# Patient Record
Sex: MALE | Race: ASIAN | NOT HISPANIC OR LATINO | Employment: FULL TIME | URBAN - METROPOLITAN AREA
[De-identification: names, ages, dates, MRNs, and addresses within clinical notes are randomized per-mention and may not be internally consistent; named-entity substitution may affect disease eponyms.]

---

## 2017-04-06 ENCOUNTER — HOSPITAL ENCOUNTER (OUTPATIENT)
Dept: ULTRASOUND IMAGING | Facility: CLINIC | Age: 32
Discharge: HOME | End: 2017-04-06
Attending: FAMILY MEDICINE

## 2017-04-06 ENCOUNTER — OFFICE VISIT - HEALTHEAST (OUTPATIENT)
Dept: FAMILY MEDICINE | Facility: CLINIC | Age: 32
End: 2017-04-06

## 2017-04-06 DIAGNOSIS — E01.0 THYROMEGALY: ICD-10-CM

## 2017-04-06 DIAGNOSIS — R35.0 URINARY FREQUENCY: ICD-10-CM

## 2017-04-06 DIAGNOSIS — R22.1 MASS OF RIGHT SIDE OF NECK: ICD-10-CM

## 2021-02-09 ENCOUNTER — OFFICE VISIT - HEALTHEAST (OUTPATIENT)
Dept: FAMILY MEDICINE | Facility: CLINIC | Age: 36
End: 2021-02-09

## 2021-02-09 DIAGNOSIS — R22.1 LUMP IN NECK: ICD-10-CM

## 2021-02-09 DIAGNOSIS — K21.9 GASTROESOPHAGEAL REFLUX DISEASE WITHOUT ESOPHAGITIS: ICD-10-CM

## 2021-02-09 ASSESSMENT — MIFFLIN-ST. JEOR: SCORE: 1543.38

## 2021-05-18 ENCOUNTER — AMBULATORY - HEALTHEAST (OUTPATIENT)
Dept: NURSING | Facility: CLINIC | Age: 36
End: 2021-05-18

## 2021-05-30 VITALS — WEIGHT: 146.9 LBS

## 2021-06-05 VITALS
WEIGHT: 155.3 LBS | DIASTOLIC BLOOD PRESSURE: 84 MMHG | BODY MASS INDEX: 25.87 KG/M2 | HEART RATE: 70 BPM | SYSTOLIC BLOOD PRESSURE: 126 MMHG | HEIGHT: 65 IN

## 2021-06-08 ENCOUNTER — AMBULATORY - HEALTHEAST (OUTPATIENT)
Dept: NURSING | Facility: CLINIC | Age: 36
End: 2021-06-08

## 2021-06-09 NOTE — PROGRESS NOTES
Subjective:   Leonid Mcmillan is a 31 y.o. male and is new to [facility].  Roomed by: Coby    Accompanied by Spouse Monicafazal    services provided by:  Helen     Chief Complaint   Patient presents with     Skin Problem     Swelling bump on right collarbone and left arm and left back; muscle twitching     Headache     2-3 months   Patient's main concern is a lump on the right side of his throat. Says he has noticed that when he lays down he can feel something inside. Denies any pain. First noticed the lump about 2 months ago. Denies any recent sweats, chills, or weight loss. Has felt more tired in the last week. 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 The reason they came in today is because he has felt some muscle switching in his legs and back in the last week. Says the headaches have been on and off for the last 2-3 months. Says when he gets a headache it goes away when he sleeps at night, but lasts all day. Is not having a headache today and denies having leg twitching today. Denies any problems with walking. Denies any problems with feeling or strength in his arms or hands. Denies any vision problems, though he wears eyeglasses to drive. Admits some dizziness off and on. Denies nausea, vomiting, diarrhea or belly pain. Says he urinates frequently, but in small amounts. Says has trouble urinating for the last 2-3 weeks. Denies any recent CP, SOB, or cough.   PMH - Denies any medical conditions  PSH - history of left shoulder surgery about 10 years ago in [place]  FX - HTN, DM in Dad, negative for Cancer, CAD, or kidney disease  Review of Systems  Const - Resp - see HPI  No Known Allergies    Current Outpatient Prescriptions:      MV-MN/FOLIC ACID/LUTEIN/IUQ774 (NOAM MULTIVITAMIN FOR MEN ORAL), Take by mouth., Disp: , Rfl:   There is no problem list on file for this patient.    Medical History Reviewed  Objective:     Vitals:    04/06/17 1235   BP: 112/72   Pulse: 76   Resp: 12   Temp: 98.5  F (36.9  C)   TempSrc: Oral    SpO2: 98%   Weight: 146 lb 14.4 oz (66.6 kg)   Gen - Pt in NAD  Eyes - PERRL, EOMI, Conjunctiva non injected, no drainage  Face - non TTP over maxillary and non TTP over frontal sinus areas  Ears - external canals - no induration, Right TM - not injected, Left TM - not injected   Nose - non congested, no nasal drainage  Pharynx - non injected, tonsils 1+ size  Neck - supple, no cervical adenopathy, about 3 cm diameter palpable firm mass just to the right of the trachea   Cor - RRR w/o murmur  Lungs - good air entry; no wheezes or crackles noted on auscultation - no coughing noted  Skin - no lesions, no rashes noted  Neuro: Oriented, CN - 2-12 intact, Sensory - neg drift, Strengths - 5/5 UE/LE = , DTRs =, Coord - intact FNF / DEVIN  Intact tandem gait, negative Rhomberg    Results for orders placed or performed in visit on 04/06/17   Urinalysis-UC if Indicated   Result Value Ref Range    Color, UA Yellow Colorless, Yellow, Straw, Light Yellow    Clarity, UA Clear Clear    Glucose, UA Negative Negative    Bilirubin, UA Negative Negative    Ketones, UA Negative Negative    Specific Gravity, UA 1.010 1.005 - 1.030    Blood, UA Trace (!) Negative    pH, UA 7.0 5.0 - 8.0    Protein, UA Negative Negative mg/dL    Urobilinogen, UA 0.2 E.U./dL 0.2 E.U./dL, 1.0 E.U./dL    Nitrite, UA Negative Negative    Leukocytes, UA Negative Negative    Bacteria, UA None Seen None Seen hpf    RBC, UA 0-2 None Seen, 0-2 hpf    WBC, UA None Seen None Seen, 0-5 hpf    Squam Epithel, UA 0-5 None Seen, 0-5 lpf   Comprehensive Metabolic Panel   Result Value Ref Range    Sodium 142 136 - 145 mmol/L    Potassium 3.8 3.5 - 5.0 mmol/L    Chloride 106 98 - 107 mmol/L    CO2 29 22 - 31 mmol/L    Anion Gap, Calculation 7 5 - 18 mmol/L    Glucose 82 70 - 125 mg/dL    BUN 9 8 - 22 mg/dL    Creatinine 0.90 0.70 - 1.30 mg/dL    GFR MDRD Af Amer >60 >60 mL/min/1.73m2    GFR MDRD Non Af Amer >60 >60 mL/min/1.73m2    Bilirubin, Total 0.5 0.0 - 1.0 mg/dL     Calcium 9.0 8.5 - 10.5 mg/dL    Protein, Total 7.8 6.0 - 8.0 g/dL    Albumin 4.2 3.5 - 5.0 g/dL    Alkaline Phosphatase 53 45 - 120 U/L    AST 18 0 - 40 U/L    ALT 22 0 - 45 U/L   Thyroid Cascade   Result Value Ref Range    TSH 0.89 0.30 - 5.00 uIU/mL   HM1 (CBC with Diff)   Result Value Ref Range    WBC 6.2 4.0 - 11.0 thou/uL    RBC 5.32 4.40 - 6.20 mill/uL    Hemoglobin 16.1 14.0 - 18.0 g/dL    Hematocrit 46.6 40.0 - 54.0 %    MCV 88 80 - 100 fL    MCH 30.3 27.0 - 34.0 pg    MCHC 34.6 32.0 - 36.0 g/dL    RDW 12.1 11.0 - 14.5 %    Platelets 202 140 - 440 thou/uL    MPV 7.8 7.0 - 10.0 fL    Neutrophils % 66 50 - 70 %    Lymphocytes % 24 20 - 40 %    Monocytes % 7 2 - 10 %    Eosinophils % 2 0 - 6 %    Basophils % 1 0 - 2 %    Neutrophils Absolute 4.1 2.0 - 7.7 thou/uL    Lymphocytes Absolute 1.5 0.8 - 4.4 thou/uL    Monocytes Absolute 0.5 0.0 - 0.9 thou/uL    Eosinophils Absolute 0.1 0.0 - 0.4 thou/uL    Basophils Absolute 0.0 0.0 - 0.2 thou/uL   Lab result discussed on day of visit.     Us Thyroid    Result Date: 4/6/2017  US THYROID 4/6/2017 2:54 PM INDICATION: Localized swelling, mass and lump, neck TECHNIQUE: Routine. COMPARISON: None. FINDINGS: Right thyroid lobe measures 5.8 x 3.8 x 3.5 cm. Solid isoechoic mass replacing a majority of the right thyroid lobe measures 4.1 x 2.7 x 3.4 cm. Separate mostly cystic partly solid nodule at the right lower pole measuring 1.3 x 1.3 x 1.0 cm.   Left thyroid lobe measures 5.2 x 1.7 x 1.8 cm. Normal echotexture with no focal nodule.  Isthmus measures 2 mm. No additional findings. No cervical lymphadenopathy. Targeted scanning was also performed at sites of lumps in the lateral left upper extremity and left lower back. Corresponding to the palpable lump of the left arm is a sharply circumscribed 8 x 8 x 5 mm hypoechoic nodule with enhanced through transmission of echoes in the subcutaneous fat. There is a similar finding in the subcutaneous fat of the left lower back  corresponding to the palpable lump measuring 6 x 4 x 6 mm. These are probably skin related lesions, possibly sebaceous cysts.     CONCLUSION: 1.  4.1 cm solid mass of the right thyroid lobe. Biopsy under ultrasound guidance suggested. 2.  Separate mostly cystic partly solid nodule at the right lower pole measuring 13 mm. 3.  Small subcutaneous cystic nodules of the left arm and left lower back correlating to palpable lumps are probably skin related lesion, possibly sebaceous cysts. Clinical follow-up suggested. The sonographer communicated the findings to the referring provider after conclusion of the examination on 4/6/2017.  Radiologist's report discussed day of visit.     Assessment - Plan   Medical Decision Making -Patient with thyroid mass but a normal TSH, CMP and CBC. Possibly subacute thyroiditis, but non TTP. No signs of hyper or hypothyroidism today. Discussed patient's thyroid ultrasound with his wife and that patient needs a biopsy of his thyroid and that it should be ordered by one of the primary providers.     1. Thyromegaly     2. Mass of right side of neck  HM1(CBC and Differential)    Comprehensive Metabolic Panel    Thyroid Cascade    HM1 (CBC with Diff)    US Thyroid   3. Urinary frequency  Urinalysis-UC if Indicated       At the conclusion of the encounter, assessment and plan were discussed. All questions were answered. The patient or guardian acknowledged understanding and was involved in the decision making regarding the overall care plan.    Patient Instructions   1. Wait at [facility] to discuss the ultrasound results  2. Follow up with one of the primary providers as soon as possible  3. If symptoms are getting worse over time or you have any questions, call the clinic number

## 2021-06-15 NOTE — PROGRESS NOTES
"Assessment:   1. Lump in neck  Discussed possible diagnoses and need for further evaluation.  Recommend the patient obtain ultrasound-guided fine-needle aspiration of the mass and follow-up as discussed.  Patient stated that he will think about it.  - US BIOPSY THYROID FINE NEEDLE ASPIRATION; Future    2. Gastroesophageal reflux disease without esophagitis  Discussed possible diagnoses and treatments.  Recommend using Tums as needed and follow-up if symptoms persist or worsens.      26 minutes spent on the date of the encounter doing chart review, patient visit and documentation        Plan:   1. fine needle aspiration biopsy  2. Return for follow-up as needed.     Subjective:   Leonid Mcmillan is a 35 y.o. male who presents for evaluation of a left neck mass.  A professional phone  was used during this visit.  The patient reports that the mass has been present for 6 years. The onset of the mass was gradual. Associated symptoms were positive for none of significance. There has not been a history of pain. The recent exposure history has been negative. Prior antibiotics have included none. Patient also reports burning sensation around his chest and throat. He reports that this does not happen all the time. He reports that symptom is mild but he is not sure is the burning sensation is due to the neck mass. Patient as no other concerns. He denied chest pain, shortness of breath and syncope.     The following portions of the patient's history were reviewed and updated as appropriate: allergies, current medications, past family history, past medical history, past social history, past surgical history and problem list.    Review of Systems  A 12 point comprehensive review of systems was negative except as noted.       Objective:      /84   Pulse 70   Ht 5' 4.5\" (1.638 m)   Wt 155 lb 4.8 oz (70.4 kg)   BMI 26.25 kg/m      General:   healthy, alert, appears stated age, not in distress   Head and Face:   no " craniofacial deformities   External Ears:   normal pinnae shape and position   Ext. Aud. Canal:  Right:patent    Left: patent    Tympanic Mem:  Right: normal landmarks and mobility   Left: normal landmarks and mobility   Nose:  Nares normal. Septum midline. Mucosa normal. No drainage or sinus tenderness.   Oropharynx:   normal tongue movement   Tonsils:   normal size, normal appearance   Post. Pharynx:   normal mucosa   Neck:   single 2 cm cervical mass on the right, firm, increases with straining   Thyroid:   Normal        Return in about 3 months (around 5/9/2021) for Recheck.    LEX Noel  [facility]

## 2022-02-07 ENCOUNTER — IMMUNIZATION (OUTPATIENT)
Dept: NURSING | Facility: CLINIC | Age: 37
End: 2022-02-07
Payer: COMMERCIAL

## 2022-02-07 PROCEDURE — 0054A COVID-19,PF,PFIZER (12+ YRS): CPT

## 2022-02-07 PROCEDURE — 91305 COVID-19,PF,PFIZER (12+ YRS): CPT

## 2022-10-18 ENCOUNTER — OFFICE VISIT (OUTPATIENT)
Dept: INTERNAL MEDICINE | Facility: CLINIC | Age: 37
End: 2022-10-18
Payer: COMMERCIAL

## 2022-10-18 VITALS
OXYGEN SATURATION: 98 % | WEIGHT: 156.3 LBS | HEIGHT: 65 IN | HEART RATE: 68 BPM | SYSTOLIC BLOOD PRESSURE: 118 MMHG | DIASTOLIC BLOOD PRESSURE: 84 MMHG | BODY MASS INDEX: 26.04 KG/M2

## 2022-10-18 DIAGNOSIS — M54.12 CERVICAL RADICULOPATHY: Primary | ICD-10-CM

## 2022-10-18 PROCEDURE — 99213 OFFICE O/P EST LOW 20 MIN: CPT | Performed by: INTERNAL MEDICINE

## 2022-10-18 RX ORDER — PREDNISONE 20 MG/1
60 TABLET ORAL DAILY
Qty: 15 TABLET | Refills: 0 | Status: SHIPPED | OUTPATIENT
Start: 2022-10-18 | End: 2022-10-23

## 2022-10-18 NOTE — PROGRESS NOTES
"  Assessment & Plan   Problem List Items Addressed This Visit    None  Visit Diagnoses     Cervical radiculopathy    -  Primary    Relevant Medications    predniSONE (DELTASONE) 20 MG tablet           Subacute right C6 radiculopathy.  I am going to treat him with a 5-day burst of prednisone.  If he is not improved afterwards he should see us back for consideration of advanced imaging.  Otherwise follow-up with us as needed.    No follow-ups on file.    Antonio Puga MD  [facility]    Malena Jaquez is a 37 year old accompanied by his spouse, Helen presenting for the following health issues:    37-year-old male without a primary care physician who comes in today for evaluation of right-sided upper back and shoulder pain with radiation down the arm and into the hand.  He states that this is been going on for the last 4 to 5 weeks.  He states that he will get pain that starts in his neck and upper back near the scapula and radiate down the arm just past the elbow and sometimes into the fourth and fifth digits of the right hand.  He does not have any problems with reaching overhead but states that his pain will worsen while sleeping on the right side.  It improves on the left side.  He is right-hand dominant.  No history of neck or shoulder surgery in the past.  He has been taking Aleve for the pain which helps temporarily    Objective    /84 (BP Location: Right arm, Patient Position: Sitting, Cuff Size: Adult Regular)   Pulse 68   Ht 1.638 m (5' 4.5\")   Wt 70.9 kg (156 lb 4.8 oz)   SpO2 98%   BMI 26.41 kg/m    Body mass index is 26.41 kg/m .  Physical Exam     Musculoskeletal: Right shoulder range of motion is full to flexion and abduction.  Strength is 5 out of 5 resisted shoulder abduction, external rotation, internal rotation, and supraspinatus testing.  Triceps reflexes slightly decreased on the right as compared to the left.  Brachioradialis and biceps reflexes are " equal bilaterally.  Spurling's test is positive on the right.

## 2023-03-02 ENCOUNTER — NURSE TRIAGE (OUTPATIENT)
Dept: NURSING | Facility: CLINIC | Age: 38
End: 2023-03-02
Payer: COMMERCIAL

## 2023-03-02 NOTE — TELEPHONE ENCOUNTER
Call from patient's wife, Deyanira, who says patient was having COVID-19, then he started renato up blood. She is asking since the clinics are booked out several months, can she take him to the ED.    Informed her that writer cannot triage w/o patient and also there's no consent to communicate for her.    Informed to call back w/ patient but if she's very concerned then he could go to the ED.      Da Mac RN, BSN  Triage Nurse Advisor    Reason for Disposition    Information only question and nurse able to answer    Protocols used: INFORMATION ONLY CALL - NO TRIAGE-A-[place]

## 2023-03-03 ENCOUNTER — APPOINTMENT (OUTPATIENT)
Dept: CT IMAGING | Facility: CLINIC | Age: 38
End: 2023-03-03
Attending: EMERGENCY MEDICINE
Payer: COMMERCIAL

## 2023-03-03 ENCOUNTER — OFFICE VISIT (OUTPATIENT)
Dept: FAMILY MEDICINE | Facility: CLINIC | Age: 38
End: 2023-03-03
Payer: COMMERCIAL

## 2023-03-03 ENCOUNTER — APPOINTMENT (OUTPATIENT)
Dept: RADIOLOGY | Facility: CLINIC | Age: 38
End: 2023-03-03
Attending: EMERGENCY MEDICINE
Payer: COMMERCIAL

## 2023-03-03 ENCOUNTER — HOSPITAL ENCOUNTER (EMERGENCY)
Facility: CLINIC | Age: 38
Discharge: HOME | End: 2023-03-03
Attending: EMERGENCY MEDICINE | Admitting: EMERGENCY MEDICINE
Payer: COMMERCIAL

## 2023-03-03 VITALS
OXYGEN SATURATION: 97 % | SYSTOLIC BLOOD PRESSURE: 123 MMHG | RESPIRATION RATE: 24 BRPM | HEIGHT: 64 IN | HEART RATE: 59 BPM | WEIGHT: 145 LBS | DIASTOLIC BLOOD PRESSURE: 89 MMHG | BODY MASS INDEX: 24.75 KG/M2 | TEMPERATURE: 98.1 F

## 2023-03-03 VITALS
HEART RATE: 72 BPM | TEMPERATURE: 98.2 F | DIASTOLIC BLOOD PRESSURE: 82 MMHG | OXYGEN SATURATION: 99 % | SYSTOLIC BLOOD PRESSURE: 132 MMHG | RESPIRATION RATE: 16 BRPM

## 2023-03-03 DIAGNOSIS — R04.2 HEMOPTYSIS: ICD-10-CM

## 2023-03-03 DIAGNOSIS — E04.1 THYROID NODULE: ICD-10-CM

## 2023-03-03 DIAGNOSIS — R04.2 HEMOPTYSIS: Primary | ICD-10-CM

## 2023-03-03 LAB
ALBUMIN SERPL-MCNC: 3.9 G/DL (ref 3.5–5)
ALP SERPL-CCNC: 63 U/L (ref 45–120)
ALT SERPL W P-5'-P-CCNC: 26 U/L (ref 0–45)
ANION GAP SERPL CALCULATED.3IONS-SCNC: 7 MMOL/L (ref 5–18)
APTT PPP: 29 SECONDS (ref 22–38)
AST SERPL W P-5'-P-CCNC: 20 U/L (ref 0–40)
BASOPHILS # BLD AUTO: 0 10E3/UL (ref 0–0.2)
BASOPHILS NFR BLD AUTO: 1 %
BILIRUB SERPL-MCNC: 0.3 MG/DL (ref 0–1)
BUN SERPL-MCNC: 16 MG/DL (ref 8–22)
CALCIUM SERPL-MCNC: 8.7 MG/DL (ref 8.5–10.5)
CHLORIDE BLD-SCNC: 108 MMOL/L (ref 98–107)
CO2 SERPL-SCNC: 25 MMOL/L (ref 22–31)
CREAT SERPL-MCNC: 0.86 MG/DL (ref 0.7–1.3)
D DIMER PPP FEU-MCNC: <=0.27 UG/ML FEU (ref 0–0.5)
EOSINOPHIL # BLD AUTO: 0.1 10E3/UL (ref 0–0.7)
EOSINOPHIL NFR BLD AUTO: 2 %
ERYTHROCYTE [DISTWIDTH] IN BLOOD BY AUTOMATED COUNT: 12.1 % (ref 10–15)
GFR SERPL CREATININE-BSD FRML MDRD: >90 ML/MIN/1.73M2
GLUCOSE BLD-MCNC: 103 MG/DL (ref 70–125)
HCT VFR BLD AUTO: 44.1 % (ref 40–53)
HGB BLD-MCNC: 14.8 G/DL (ref 13.3–17.7)
IMM GRANULOCYTES # BLD: 0 10E3/UL
IMM GRANULOCYTES NFR BLD: 0 %
INR PPP: 1.06 (ref 0.85–1.15)
LYMPHOCYTES # BLD AUTO: 1.5 10E3/UL (ref 0.8–5.3)
LYMPHOCYTES NFR BLD AUTO: 26 %
MCH RBC QN AUTO: 28.8 PG (ref 26.5–33)
MCHC RBC AUTO-ENTMCNC: 33.6 G/DL (ref 31.5–36.5)
MCV RBC AUTO: 86 FL (ref 78–100)
MONOCYTES # BLD AUTO: 0.5 10E3/UL (ref 0–1.3)
MONOCYTES NFR BLD AUTO: 8 %
NEUTROPHILS # BLD AUTO: 3.6 10E3/UL (ref 1.6–8.3)
NEUTROPHILS NFR BLD AUTO: 63 %
NRBC # BLD AUTO: 0 10E3/UL
NRBC BLD AUTO-RTO: 0 /100
PLATELET # BLD AUTO: 203 10E3/UL (ref 150–450)
POTASSIUM BLD-SCNC: 4.2 MMOL/L (ref 3.5–5)
PROT SERPL-MCNC: 7.6 G/DL (ref 6–8)
RBC # BLD AUTO: 5.13 10E6/UL (ref 4.4–5.9)
SODIUM SERPL-SCNC: 140 MMOL/L (ref 136–145)
TROPONIN I SERPL-MCNC: <0.01 NG/ML (ref 0–0.29)
TSH SERPL DL<=0.005 MIU/L-ACNC: 1.07 UIU/ML (ref 0.3–5)
WBC # BLD AUTO: 5.6 10E3/UL (ref 4–11)

## 2023-03-03 PROCEDURE — 71275 CT ANGIOGRAPHY CHEST: CPT

## 2023-03-03 PROCEDURE — 99214 OFFICE O/P EST MOD 30 MIN: CPT | Performed by: PHYSICIAN ASSISTANT

## 2023-03-03 PROCEDURE — 71046 X-RAY EXAM CHEST 2 VIEWS: CPT

## 2023-03-03 PROCEDURE — 250N000011 HC RX IP 250 OP 636: Performed by: EMERGENCY MEDICINE

## 2023-03-03 PROCEDURE — 85610 PROTHROMBIN TIME: CPT | Performed by: EMERGENCY MEDICINE

## 2023-03-03 PROCEDURE — 85730 THROMBOPLASTIN TIME PARTIAL: CPT | Performed by: EMERGENCY MEDICINE

## 2023-03-03 PROCEDURE — 99285 EMERGENCY DEPT VISIT HI MDM: CPT | Mod: 25

## 2023-03-03 PROCEDURE — 250N000013 HC RX MED GY IP 250 OP 250 PS 637: Performed by: EMERGENCY MEDICINE

## 2023-03-03 PROCEDURE — 85025 COMPLETE CBC W/AUTO DIFF WBC: CPT | Performed by: EMERGENCY MEDICINE

## 2023-03-03 PROCEDURE — 93005 ELECTROCARDIOGRAM TRACING: CPT | Performed by: EMERGENCY MEDICINE

## 2023-03-03 PROCEDURE — 80053 COMPREHEN METABOLIC PANEL: CPT | Performed by: EMERGENCY MEDICINE

## 2023-03-03 PROCEDURE — 84484 ASSAY OF TROPONIN QUANT: CPT | Performed by: EMERGENCY MEDICINE

## 2023-03-03 PROCEDURE — 84443 ASSAY THYROID STIM HORMONE: CPT | Performed by: EMERGENCY MEDICINE

## 2023-03-03 PROCEDURE — 36415 COLL VENOUS BLD VENIPUNCTURE: CPT | Performed by: EMERGENCY MEDICINE

## 2023-03-03 PROCEDURE — 85379 FIBRIN DEGRADATION QUANT: CPT | Performed by: EMERGENCY MEDICINE

## 2023-03-03 RX ORDER — BENZONATATE 200 MG/1
200 CAPSULE ORAL 3 TIMES DAILY PRN
Qty: 15 CAPSULE | Refills: 0 | Status: SHIPPED | OUTPATIENT
Start: 2023-03-03 | End: 2023-03-08

## 2023-03-03 RX ORDER — BENZONATATE 100 MG/1
200 CAPSULE ORAL 3 TIMES DAILY PRN
Status: DISCONTINUED | OUTPATIENT
Start: 2023-03-03 | End: 2023-03-04 | Stop reason: HOSPADM

## 2023-03-03 RX ORDER — METHYLPREDNISOLONE 4 MG
TABLET, DOSE PACK ORAL
Qty: 21 TABLET | Refills: 0 | Status: SHIPPED | OUTPATIENT
Start: 2023-03-03 | End: 2023-05-08

## 2023-03-03 RX ORDER — IOPAMIDOL 755 MG/ML
90 INJECTION, SOLUTION INTRAVASCULAR ONCE
Status: COMPLETED | OUTPATIENT
Start: 2023-03-03 | End: 2023-03-03

## 2023-03-03 RX ADMIN — IOPAMIDOL 90 ML: 755 INJECTION, SOLUTION INTRAVENOUS at 20:32

## 2023-03-03 RX ADMIN — BENZONATATE 200 MG: 100 CAPSULE ORAL at 22:16

## 2023-03-03 ASSESSMENT — ENCOUNTER SYMPTOMS
VOMITING: 0
NAUSEA: 0
FEVER: 0
WHEEZING: 1
SORE THROAT: 0
CHILLS: 0
VOMITING: 0
FEVER: 0
ABDOMINAL PAIN: 0
COUGH: 1
SHORTNESS OF BREATH: 1
DIAPHORESIS: 0
SHORTNESS OF BREATH: 1
COUGH: 1

## 2023-03-03 ASSESSMENT — ACTIVITIES OF DAILY LIVING (ADL)
ADLS_ACUITY_SCORE: 35
ADLS_ACUITY_SCORE: 35

## 2023-03-03 NOTE — ED PROVIDER NOTES
EMERGENCY DEPARTMENT ENCOUNTER      NAME: Leonid Mcmillan  AGE: 37 year old male  YOB: 1985  MRN: [id number]  EVALUATION DATE & TIME: 3/3/2023  5:33 PM    PCP: System, Provider Not In    ED PROVIDER: Grace Bryan PA-C      Chief Complaint   Patient presents with     Hemoptysis         FINAL IMPRESSION:  1. Hemoptysis    2. Thyroid nodule          ED COURSE & MEDICAL DECISION MAKING:    Pertinent Labs & Imaging studies reviewed. (See chart for details)    37 year old male presents to the Emergency Department for evaluation of hemoptysis.    Physical exam is remarkable for a well-appearing male who is in no acute distress.  Heart and lung sounds are remarkable for coarse sounds throughout, no respiratory distress.  Abdomen is soft and nontender.  Oropharynx is unremarkable appearing. He does have a large thyroid nodule on the right side which is easily palpable. No calf tenderness.  Vital signs are stable and he is afebrile.    CBC is unremarkable with no leukocytosis or anemia.  CMP is unremarkable with no significant electrolyte derangements, normal liver and kidney function.  Troponin is negative, EKG without acute ischemic changes.  D-dimer is negative.  PTT and INR within normal limits.  TSH is pending but would not change disposition.  Chest x-ray is negative with no evidence of pneumonia.  Despite negative D-dimer, given that there is no obvious source for hemoptysis and he is a large thyroid nodule on the right side which has not been evaluated for several years, CTA of the chest was obtained.  This does show deviation of the trachea due to the thyroid nodule, small intraluminal tracheal component is noted on the right.    The patient declined any medication for pain or nausea.  Discussed his CTA findings with ENT due to concern about possible thyroid bleeding as the cause of his hemoptysis, they recommend outpatient work-up since the patient is hemodynamically stable here and there is no evidence of  tracheal erosion on his CT scan.  I do think that is reasonable as this has been going on for about 2 weeks and his hemoglobin here is normal.  I did place an urgent referral to ENT, discussed that his hemoptysis may just be due to some mucosal irritation since he has been coughing with his recent COVID infection.  I will prescribe him Tessalon Perles and a Medrol Dosepak to help treat the cough and prevent further damage to any possibly damaged mucosa.  Advised him to return here for any new or worsening symptoms, he is agreeable with this treatment plan and verbalized his understanding.  Patient's care was discussed with staff physician Dr. Prakash Dupree who is in agreement with the treatment plan.        Medical Decision Making    History:    Supplemental history from: Family Member/Significant Other    External Record(s) reviewed: Inpatient Record:  Clinic Note - 3/3/23    Work Up:    Chart documentation includes differential considered and any EKGs or imaging independently interpreted by provider, where specified.    In additional to work up documented, I considered the following work up: Documented in chart, if applicable.    External consultation:    Discussion of management with another provider: ENT    Complicating factors:    Care impacted by chronic illness: N/A    Care affected by social determinants of health: N/A    Disposition considerations: Discharge. No recommendations on prescription strength medication(s). I considered admission, but discharged the patient after share decision making conversation.        ED Course   5:40 PM Performed my initial history and physical exam. Discussed workup in the emergency department, management of symptoms, and likely disposition.   7:40 PM I rechecked and updated the patient.   9:15 PM Staffed patient with Dr. Dupree.   9:16 PM I rechecked and updated the patient.   9:37 PM I spoke to Dr. Ac ENT.   9:50 PM Dr. Dupree discussed the plan for discharge with the  patient or family and they are agreeable.. We discussed supportive cares at home and reasons for return to the ER including new or worsening symptoms - all questions and concerns addressed. Patient to be discharged by RN.       At the conclusion of the encounter I discussed the results of all of the tests and the disposition. The questions were answered. The patient or family acknowledged understanding and was agreeable with the care plan.     Voice recognition software was used in the creation of this note. Any grammatical or nonsensical errors are due to inherent errors with the software and are not the intention of the writer.     MEDICATIONS GIVEN IN THE EMERGENCY:  Medications   benzonatate (TESSALON) capsule 200 mg (has no administration in time range)   iopamidol (ISOVUE-370) solution 90 mL (90 mLs Intravenous $Given 3/3/23 2032)       NEW PRESCRIPTIONS STARTED AT TODAY'S ER VISIT  New Prescriptions    BENZONATATE (TESSALON) 200 MG CAPSULE    Take 1 capsule (200 mg) by mouth 3 times daily as needed for cough    METHYLPREDNISOLONE (MEDROL DOSEPAK) 4 MG TABLET THERAPY PACK    Follow Package Directions            =================================================================    HPI    Patient information was obtained from: Patient     Use of : N/A         Leonid Mcmillan is a 37 year old male who presents with hemoptysis.     Per chart review, the patient was seen at [facility] earlier today for hemoptysis. Given recent COVID infection provider noted that the patient has hypercoagulability risk factor, but otherwise PERC neg. No D Dimer capabilities in the evening at the  clinic, prompting patient to be evaluated in the ED.      For the past two weeks, the patient reports occasional episodes of hemoptysis. He has been coughing up approximately a tablespoon of blood with no phlegm during these episodes; he is unsure how many episodes he has had. The patient adds that when he isn't coughing up blood,  he has a dry cough. The patient denies chest pain, but endorses shortness of breath, especially when laying flat in bed and upon exertion. He denies abdominal pain. No nausea or vomiting. The patient denies a history of PE or DVT. No recent travel, recent surgeries, or estrogen containing medications. He is not on blood thinners.     Patient reports being diagnosed with COVID-19 ~1 month ago and denies ongoing fevers, chills, and night sweats. Per wife, the patient has had one booster shot for COVID-19, but is potentially overdue for a second booster.      He denies pertinent medical issues, including no HTN, diabetes, or heart disease.     He otherwise denies any other symptoms or complaints at this time.     Social Hx: Non-Smoker. Non-Alcohol User. Moved to the [place] in 2014 from [facility] (~7 years ago). He reports no history of tuberculosis and received a BCG vaccine.    REVIEW OF SYSTEMS   Review of Systems   Constitutional: Negative for chills, diaphoresis and fever.   Respiratory: Positive for cough (with blood) and shortness of breath.    Cardiovascular: Negative for chest pain.   Gastrointestinal: Negative for abdominal pain, nausea and vomiting.   All other systems reviewed and are negative.      All other systems reviewed and are negative unless noted in HPI.      PAST MEDICAL HISTORY:  History reviewed. No pertinent past medical history.    PAST SURGICAL HISTORY:  History reviewed. No pertinent surgical history.    CURRENT MEDICATIONS:    benzonatate (TESSALON) 200 MG capsule  methylPREDNISolone (MEDROL DOSEPAK) 4 MG tablet therapy pack        ALLERGIES:  No Known Allergies    FAMILY HISTORY:  History reviewed. No pertinent family history.    SOCIAL HISTORY:   Social History     Socioeconomic History     Marital status:    Tobacco Use     Smoking status: Never     Smokeless tobacco: Never       VITALS:  Patient Vitals for the past 24 hrs:   BP Temp Temp src Pulse Resp SpO2 Height Weight  "  03/03/23 2145 (!) 130/94 -- -- 63 24 96 % -- --   03/03/23 2100 -- -- -- 64 -- 98 % -- --   03/03/23 1915 -- -- -- 69 27 97 % -- --   03/03/23 1830 -- -- -- 66 23 96 % -- --   03/03/23 1738 131/85 98.1  F (36.7  C) Oral 77 18 99 % 1.626 m (5' 4\") 65.8 kg (145 lb)       PHYSICAL EXAM    VITAL SIGNS: BP (!) 130/94   Pulse 63   Temp 98.1  F (36.7  C) (Oral)   Resp 24   Ht 1.626 m (5' 4\")   Wt 65.8 kg (145 lb)   SpO2 96%   BMI 24.89 kg/m    General Appearance: Alert, cooperative, normal speech and facial symmetry, appears stated age, the patient does not appear in distress  Head:  Normocephalic, without obvious abnormality, atraumatic  Eyes: Conjunctiva/corneas clear, EOM's intact, no nystagmus, PERRL  ENT: Large thyroid nodule on the right side which is easily palpable; Lips, mucosa, and tongue normal; teeth and gums normal, no pharyngeal inflammation, no dysphonia or difficulty swallowing, membranes are moist without pallor  Cardio:  Regular rate and rhythm, S1 and S2 normal, no murmur, rub    or gallop, 2+ pulses symmetric in all extremities  Pulm:  Clear to auscultation bilaterally, respirations unlabored with no accessory muscle use  Abdomen:  Abdomen is soft, non-distended with no tenderness to palpation, rebound tenderness, or guarding.   Extremities: Moves all extremities, no calf tenderness  Neuro: Patient is awake, alert, and responsive to voice. No gross motor weaknesses or sensory loss; moves all extremities.    LAB:  All pertinent labs reviewed and interpreted.  Labs Ordered and Resulted from Time of ED Arrival to Time of ED Departure   COMPREHENSIVE METABOLIC PANEL - Abnormal       Result Value    Sodium 140      Potassium 4.2      Chloride 108 (*)     Carbon Dioxide (CO2) 25      Anion Gap 7      Urea Nitrogen 16      Creatinine 0.86      Calcium 8.7      Glucose 103      Alkaline Phosphatase 63      AST 20      ALT 26      Protein Total 7.6      Albumin 3.9      Bilirubin Total 0.3      GFR " Estimate >90     D DIMER QUANTITATIVE - Normal    D-Dimer Quantitative <=0.27     INR - Normal    INR 1.06     PARTIAL THROMBOPLASTIN TIME - Normal    aPTT 29     TROPONIN I - Normal    Troponin I <0.01     CBC WITH PLATELETS AND DIFFERENTIAL    WBC Count 5.6      RBC Count 5.13      Hemoglobin 14.8      Hematocrit 44.1      MCV 86      MCH 28.8      MCHC 33.6      RDW 12.1      Platelet Count 203      % Neutrophils 63      % Lymphocytes 26      % Monocytes 8      % Eosinophils 2      % Basophils 1      % Immature Granulocytes 0      NRBCs per 100 WBC 0      Absolute Neutrophils 3.6      Absolute Lymphocytes 1.5      Absolute Monocytes 0.5      Absolute Eosinophils 0.1      Absolute Basophils 0.0      Absolute Immature Granulocytes 0.0      Absolute NRBCs 0.0     TSH WITH FREE T4 REFLEX       RADIOLOGY:  Reviewed all pertinent imaging. Please see official radiology report.  CT Chest Pulmonary Embolism w Contrast   Final Result   IMPRESSION:   1.  Markedly enlarged right lobe of the thyroid with tracheal deviation to the left of midline. Further evaluation with thyroid ultrasound suggested.   2.  Minimal intraluminal, tracheal component on the right where trachea is deviated most likely reflects adherent secretions rather than a polyp or an infiltrative component.   3.  No other abnormalities are seen to explain hemoptysis.   4.  No evidence of pulmonary emboli.      NOTE: ABNORMAL REPORT      1.  THE DICTATION ABOVE DESCRIBES AN ABNORMALITY FOR WHICH FOLLOW-UP IS NEEDED.       Chest XR,  PA & LAT   Final Result   IMPRESSION: Lungs are clear. Heart and pulmonary vascularity are normal. No signs of acute disease.          EKG:    Performed at: 18:04    I have independently reviewed and interpreted the EKG, along with the final read. EKG also reviewed by Dr. Beka Guy.    Ventricular rate 72 bpm  LA interval 160 ms  QRS duration 96 ms  QT/QTc 382/418 ms  P-R-T axes 25 9 20    Impression: Sinus rhythm with sinus  arrhythmia      I, Deion Edwards, am serving as a scribe to document services personally performed by Grace Bryan PA-C based on my observation and the provider's statements to me. I, Grace Bryan PA-C attest that Deion Edwards is acting in a scribe capacity, has observed my performance of the services and has documented them in accordance with my direction.     Grace Bryan PA-C  Emergency Medicine  [facility] EMERGENCY ROOM  [address] [id number]  [phone]  Dept: [phone]     Grace Bryan PA-C  03/03/23 8925

## 2023-03-03 NOTE — PROGRESS NOTES
Patient presents with:  Cough: Coughing up blood for 2 weeks      Clinical Decision Making: DDx: PE, TB, malignancy, pneumonia, extrapulmonary bleeding source such as throat or epistaxis. Given recent COVID infection patient does have hypercoagulability risk factor, but it otherwise PERC neg. We do not have D Dimer capabilities at this point in the evening at the  clinic. Due to this lack of capability I recommend the patient be evaluated at higher level of care.  I attempted to give report to [facility] emergency department, but after 15 minutes on hold I did stop trying to give report. Patient transported via private vehicle.       ICD-10-CM    1. Hemoptysis  R04.2           Patient Instructions   Report to [facility] Emergency Department for additional workup.       HPI:  Leonid Mcmillan is a 37 year old male who presents today complaining of hemoptysis x 2 weeks. Patient reports having had COVID 5 weeks ago. Patient does feel a little SOB. No chest pain. No bloody noses, fevers, vomiting, or leg pain. Patient's SOB is mainly when he is laying down. He also has some mucous that drains down the back of the throat. Patient was originally from [facility] and moved to the  in 2014.     History obtained from the patient.    Problem List:  There are no relevant problems documented for this patient.      No past medical history on file.    Social History     Tobacco Use     Smoking status: Never     Smokeless tobacco: Never   Substance Use Topics     Alcohol use: Not on file       Review of Systems   Constitutional: Negative for fever.   HENT: Negative for nosebleeds and sore throat.    Respiratory: Positive for cough (with blood), shortness of breath (when laying down) and wheezing.    Cardiovascular: Negative for chest pain.   Gastrointestinal: Negative for vomiting.       Vitals:    03/03/23 1636   BP: 132/82   Pulse: 72   Resp: 16   Temp: 98.2  F (36.8  C)   TempSrc: Oral   SpO2: 99%       Physical Exam  Vitals and nursing note  reviewed.   Constitutional:       General: He is not in acute distress.     Appearance: He is not toxic-appearing or diaphoretic.   HENT:      Head: Normocephalic and atraumatic.      Right Ear: External ear normal.      Left Ear: External ear normal.      Mouth/Throat:      Mouth: Mucous membranes are moist.      Pharynx: No oropharyngeal exudate or posterior oropharyngeal erythema.   Eyes:      Conjunctiva/sclera: Conjunctivae normal.   Cardiovascular:      Rate and Rhythm: Normal rate and regular rhythm.      Heart sounds: No murmur heard.  Pulmonary:      Effort: Pulmonary effort is normal. No respiratory distress.      Breath sounds: No stridor. No wheezing, rhonchi or rales.      Comments: Diffuse mildly coarse lung sounds. No focal findings.   Neurological:      Mental Status: He is alert.   Psychiatric:         Mood and Affect: Mood normal.         Behavior: Behavior normal.         Thought Content: Thought content normal.         Judgment: Judgment normal.

## 2023-03-03 NOTE — ED TRIAGE NOTES
2 week history of coughing up bloody sputum.  Recent Covid 19 infection.  Pt arrived from [facility] in 2014.  Sent to ED to R/O possible TB or other infection.  C/O feeling SOB when lying flat.       Triage Assessment     Row Name 03/03/23 4531       Triage Assessment (Adult)    Airway WDL WDL       Respiratory WDL    Respiratory WDL X  Coarse breath sounds and hemoptysis.  SOB when lying flat.       Skin Circulation/Temperature WDL    Skin Circulation/Temperature WDL WDL       Cardiac WDL    Cardiac WDL WDL       Peripheral/Neurovascular WDL    Peripheral Neurovascular WDL WDL       Cognitive/Neuro/Behavioral WDL    Cognitive/Neuro/Behavioral WDL WDL

## 2023-03-04 LAB
ATRIAL RATE - MUSE: 72 BPM
DIASTOLIC BLOOD PRESSURE - MUSE: 85 MMHG
INTERPRETATION ECG - MUSE: NORMAL
P AXIS - MUSE: 25 DEGREES
PR INTERVAL - MUSE: 160 MS
QRS DURATION - MUSE: 96 MS
QT - MUSE: 382 MS
QTC - MUSE: 418 MS
R AXIS - MUSE: 9 DEGREES
SYSTOLIC BLOOD PRESSURE - MUSE: 131 MMHG
T AXIS - MUSE: 20 DEGREES
VENTRICULAR RATE- MUSE: 72 BPM

## 2023-03-04 NOTE — DISCHARGE INSTRUCTIONS
You were seen here today for evaluation of coughing up blood.  Your work-up today is reassuring with no evidence of heart attack, blood clots, or infection.  Your CT scan does show that the nodule on the right side of your thyroid has continued to grow.  You need to have this evaluated by ENT as it is pushing your windpipe to the side.    I placed a referral so they should be calling you to schedule this.  If you do not hear from them in the next few days, please call them to schedule.    Return to the emergency room immediately if you develop any new or worsening symptoms including severe pain, recurrent or worsening bleeding, passing out, fever, or any other symptoms that concern you.

## 2023-03-04 NOTE — ED PROVIDER NOTES
"Emergency Department Midlevel Supervisory Note     I personally saw the patient and performed a substantive portion of the visit including all aspects of the medical decision making.    ED Course:  9:16 PM Grace Bryan PA-C staffed patient with me. I agree with their assessment and plan of management, and I will see the patient.  9:43 PM I met with the patient to introduce myself, gather additional history, perform my initial exam, and discuss the plan.     Brief HPI:     Leonid Mcmillan is a 37 year old male who presents for evaluation of hemoptysis. For the past two weeks, the patient reports occasional episodes of hemoptysis. He has been coughing up approximately a tablespoon of blood with no phlegm during these episodes; he is unsure how many episodes he has had. The patient adds that when he isn't coughing up blood, he has a dry cough. The patient denies chest pain, but endorses shortness of breath, especially when laying flat in bed and upon exertion. He denies abdominal pain. No nausea or vomiting. The patient denies a history of PE or DVT. No recent travel, recent surgeries, or estrogen containing medications. He is not on blood thinners.     I, Deion Edwards, am serving as a scribe to document services personally performed by Jayjay Dupree MD, based on my observations and the provider's statements to me.   I, Jayjay Dupree MD, attest that Deion Edwards was acting in a scribe capacity, has observed my performance of the services and has documented them in accordance with my direction.    Brief Physical Exam: /89   Pulse 59   Temp 98.1  F (36.7  C) (Oral)   Resp 24   Ht 1.626 m (5' 4\")   Wt 65.8 kg (145 lb)   SpO2 97%   BMI 24.89 kg/m    Constitutional:  Alert, in no acute distress  HENT:  Atraumatic, normocephalic, no exophthalmos  Respiratory:  Respirations even, unlabored, in no acute respiratory distress, no stridor  Cardiovascular:  Regular rate and rhythm, good peripheral " perfusion  Musculoskeletal:  No cyanosis. Range of motion major extremities intact.    Integument: Warm, Dry, No erythema, No rash.,  No pallor  Neurologic:  Alert & oriented, no focal deficits noted     MDM:  37-year-old male presenting with hemoptysis.  Patient does have history of known thyroid nodule.  He has no signs of airway obstruction or stridor.  Hemoptysis was bright red which was likely hematemesis and given coughing from COVID could be just bronchitis versus pulmonary embolism.  Patient had work-up with [facility] and I agree with plan for CT.  CT returned showing large right thyroid nodule that is pushing on the trachea and I some possible secretion or mucus at the inside of the trachea there at the mass.  This could possibly be bleeding from the mass but the mass does not appear to be eroding through the tracheal wall.  Given that the thyroid arterial supply could be concerning for bleeding but hemoglobin is stable, blood pressure stable, and patient otherwise comfortable with no further hemoptysis.  Patient discussed with ENT who recommended outpatient follow-up and most likely the hemoptysis is related to some mucosal irritation from the COVID-19 and possibly at that site but not related to erosion of thyroid and thyroid arteries into the trachea.  Comfortable and after discussion we will be plan for discharge with follow-up.  Patient comfortable with the plan for follow-up with ENT.       1. Hemoptysis    2. Thyroid nodule        Labs and Imaging:  Results for orders placed or performed during the hospital encounter of 03/03/23   Chest XR,  PA & LAT    Impression    IMPRESSION: Lungs are clear. Heart and pulmonary vascularity are normal. No signs of acute disease.   CT Chest Pulmonary Embolism w Contrast    Impression    IMPRESSION:  1.  Markedly enlarged right lobe of the thyroid with tracheal deviation to the left of midline. Further evaluation with thyroid ultrasound suggested.  2.  Minimal  intraluminal, tracheal component on the right where trachea is deviated most likely reflects adherent secretions rather than a polyp or an infiltrative component.  3.  No other abnormalities are seen to explain hemoptysis.  4.  No evidence of pulmonary emboli.    NOTE: ABNORMAL REPORT    1.  THE DICTATION ABOVE DESCRIBES AN ABNORMALITY FOR WHICH FOLLOW-UP IS NEEDED.    D dimer quantitative   Result Value Ref Range    D-Dimer Quantitative <=0.27 0.00 - 0.50 ug/mL FEU   Result Value Ref Range    INR 1.06 0.85 - 1.15   Partial thromboplastin time   Result Value Ref Range    aPTT 29 22 - 38 Seconds   Comprehensive metabolic panel   Result Value Ref Range    Sodium 140 136 - 145 mmol/L    Potassium 4.2 3.5 - 5.0 mmol/L    Chloride 108 (H) 98 - 107 mmol/L    Carbon Dioxide (CO2) 25 22 - 31 mmol/L    Anion Gap 7 5 - 18 mmol/L    Urea Nitrogen 16 8 - 22 mg/dL    Creatinine 0.86 0.70 - 1.30 mg/dL    Calcium 8.7 8.5 - 10.5 mg/dL    Glucose 103 70 - 125 mg/dL    Alkaline Phosphatase 63 45 - 120 U/L    AST 20 0 - 40 U/L    ALT 26 0 - 45 U/L    Protein Total 7.6 6.0 - 8.0 g/dL    Albumin 3.9 3.5 - 5.0 g/dL    Bilirubin Total 0.3 0.0 - 1.0 mg/dL    GFR Estimate >90 >60 mL/min/1.73m2   Result Value Ref Range    Troponin I <0.01 0.00 - 0.29 ng/mL   CBC with platelets and differential   Result Value Ref Range    WBC Count 5.6 4.0 - 11.0 10e3/uL    RBC Count 5.13 4.40 - 5.90 10e6/uL    Hemoglobin 14.8 13.3 - 17.7 g/dL    Hematocrit 44.1 40.0 - 53.0 %    MCV 86 78 - 100 fL    MCH 28.8 26.5 - 33.0 pg    MCHC 33.6 31.5 - 36.5 g/dL    RDW 12.1 10.0 - 15.0 %    Platelet Count 203 150 - 450 10e3/uL    % Neutrophils 63 %    % Lymphocytes 26 %    % Monocytes 8 %    % Eosinophils 2 %    % Basophils 1 %    % Immature Granulocytes 0 %    NRBCs per 100 WBC 0 <1 /100    Absolute Neutrophils 3.6 1.6 - 8.3 10e3/uL    Absolute Lymphocytes 1.5 0.8 - 5.3 10e3/uL    Absolute Monocytes 0.5 0.0 - 1.3 10e3/uL    Absolute Eosinophils 0.1 0.0 - 0.7 10e3/uL     Absolute Basophils 0.0 0.0 - 0.2 10e3/uL    Absolute Immature Granulocytes 0.0 <=0.4 10e3/uL    Absolute NRBCs 0.0 10e3/uL   TSH with free T4 reflex   Result Value Ref Range    TSH 1.07 0.30 - 5.00 uIU/mL   ECG 12-LEAD WITH MUSE (LHE)   Result Value Ref Range    Systolic Blood Pressure 131 mmHg    Diastolic Blood Pressure 85 mmHg    Ventricular Rate 72 BPM    Atrial Rate 72 BPM    IA Interval 160 ms    QRS Duration 96 ms     ms    QTc 418 ms    P Axis 25 degrees    R AXIS 9 degrees    T Axis 20 degrees    Interpretation ECG       Sinus rhythm with sinus arrhythmia  Normal ECG  No previous ECGs available  Confirmed by SEE ED PROVIDER NOTE FOR, ECG INTERPRETATION ([id number]),  SALLY CHAVARRIA ([id number]) on 3/4/2023 12:34:09 AM       I have reviewed the relevant laboratory and radiology studies    Procedures:  I was present for the key portions of this procedure: none    Jayjay Dupree MD  [facility] EMERGENCY ROOM  [address] [place] [id number]  [phone]     Jayjay Dupree MD  03/04/23 0041

## 2023-03-04 NOTE — CONSULTS
Otolaryngology    2 week history or coughing up bloody sputum. Hgb normal. Hemodynamically stable.  Imaging reviewed showing a large right thyroid nodule causing deviation and mild compression of the trachea. Tracheal invasion is not suspected by Radiologist report, nor by my impression following review of the images. Laryngeal evaluation and the thyroid to be addressed as an outpatient.     Sujata Ac MD  [facility] ENT  [phone] (office)

## 2023-03-06 ENCOUNTER — TELEPHONE (OUTPATIENT)
Dept: OTOLARYNGOLOGY | Facility: CLINIC | Age: 38
End: 2023-03-06
Payer: COMMERCIAL

## 2023-03-06 NOTE — TELEPHONE ENCOUNTER
[facility] Call Center    Phone Message    May a detailed message be left on voicemail: no     Reason for Call: Symptoms or Concerns     If patient has red-flag symptoms, warm transfer to triage line    Current symptom or concern: Patient scheduled 1st available appointment for thyroid nodule,  per the protocols sending encounter since cannot be seen in 5 days.          Action Taken: Other: ENT    Travel Screening: Not Applicable

## 2023-03-06 NOTE — TELEPHONE ENCOUNTER
Called pt with  services and left a VM to call back.  Dr. Chicas does not see Thyroid and needs to be rescheduled with Dr. Ac.    [facility]      Demetria Coreas RN  [facility]  ENT  [address] [place] [id number]  [email or web address]   Office:[phone]  Employed by [facility]

## 2023-03-07 NOTE — TELEPHONE ENCOUNTER
Spoke with Leonid and got him rescheduled with Dr. Ac as Dr. Chicas does not see thyroid DX.  PT expressed understanding of new appointment.    [facility]      Demetria Coreas RN  [facility]  ENT  [facility] [address]  [email or web address]   Office:[phone]  Employed by [facility]

## 2023-04-14 ENCOUNTER — OFFICE VISIT (OUTPATIENT)
Dept: OTOLARYNGOLOGY | Facility: CLINIC | Age: 38
End: 2023-04-14
Payer: COMMERCIAL

## 2023-04-14 DIAGNOSIS — E04.1 THYROID NODULE: ICD-10-CM

## 2023-04-14 DIAGNOSIS — R04.2 HEMOPTYSIS: ICD-10-CM

## 2023-04-14 PROCEDURE — 31575 DIAGNOSTIC LARYNGOSCOPY: CPT | Performed by: OTOLARYNGOLOGY

## 2023-04-14 PROCEDURE — 99244 OFF/OP CNSLTJ NEW/EST MOD 40: CPT | Mod: 25 | Performed by: OTOLARYNGOLOGY

## 2023-04-14 NOTE — LETTER
4/14/2023         RE: Leonid Mcmillan  [address] [place] [address]        Dear Colleague,    Thank you for referring your patient, Leonid Mcmillan, to the [facility]. Please see a copy of my visit note below.    HPI: This patient is a 36yo M who presents to clinic for evaluation of a thyroid mass and hemoptysis at the request of Dr. Dupree. He was recently in the hospital for hemoptysis. This occurred after having had covid and his partner notes that he was coughing so hard. The patient reports that he was given a medication at the hospital that he has been taking and he has not had any more hemoptysis since discharge. It was seen on a CT scan during that hospital visit that he has a large right thyroid nodule and some tracheal deviation. He has known about this for years and thinks it has slowly been growing over time. He denies SOB, TURNER, dysphagia, neck pain, etc.     Past medical history, surgical history, social history, family history, medications, and allergies have been reviewed with the patient and are documented above.    Review of Systems: a 10-system review was performed. Pertinent positives are noted in the HPI and on a separate scanned document in the chart.    PHYSICAL EXAMINATION:  GEN: no acute distress, normocephalic  EYES: extraocular movements are intact, pupils are equal and round. Sclera clear.   EARS: auricles are normally formed. The external auditory canals are clear with minimal to no cerumen. Tympanic membranes are intact bilaterally with no signs of infection, effusion, retractions, or perforations.  NOSE: anterior nares are patent. There are no masses or lesions. The septum is non-obstructing. Slightly dry, R>L.  OC/OP: clear, dentition is in good repair. The tongue and palate are fully mobile and symmetric. No masses or lesions identified.   HP/L (scope): nasopharynx, base of tongue, vallecula, epiglottis, and pyriform sinuses are clear. The bilateral vocal folds  are mobile and without lesion  NECK: soft and supple. No lymphadenopathy. Airway is midline. Right thyroid is large, firm, and very nodular in texture. It moves with swallowing.  NEURO: CN VII and XII symmetric. alert and oriented. No spontaneous nystagmus. Gait is normal.  PULM: breathing comfortably on room air, normal chest expansion with respiration  CARDS: no cyanosis or clubbing, normal carotid pulses    FLEXIBLE LARYNGOSCOPY: Scope inserted bilaterally to examine nasal tissue, nasopharynx, posterior oropharynx, hypopharynx, and larynx. See exam notes for exam finding details. Patient tolerated the procedure well.    CT CHEST 2023:  IMPRESSION:  1.  Markedly enlarged right lobe of the thyroid with tracheal deviation to the left of midline. Further evaluation with thyroid ultrasound suggested. (at least 5x4cm per report)  2.  Minimal intraluminal, tracheal component on the right where trachea is deviated most likely reflects adherent secretions rather than a polyp or an infiltrative component.  3.  No other abnormalities are seen to explain hemoptysis.  4.  No evidence of pulmonary emboli.    THYROID U/S 2017:  CONCLUSION:  1.  4.1 cm solid mass of the right thyroid lobe. Biopsy under ultrasound guidance suggested.  2.  Separate mostly cystic partly solid nodule at the right lower pole measuring 13 mm.  3.  Small subcutaneous cystic nodules of the left arm and left lower back correlating to palpable lumps are probably skin related lesion, possibly sebaceous cysts. Clinical follow-up suggested.    MEDICAL DECISION-MAKING: This patient is a 38yo M with a large thyroid nodule present for at least 6 years. The ultrasound that was recommended by the CT has not been ordered, nor has the patient had a thyroid biopsy of this in the past. These were ordered today and will be followed up on by another provider after my departure from [facility]. Given the degree of tracheal deviation, it would be reasonable to perform a  right thyroid lobectomy regardless of pathology. However, an FNA can provide information that may steer the discussion of surgical intervention, melyssa vs total, etc. Referral sent for further care management at the .      Again, thank you for allowing me to participate in the care of your patient.        Sincerely,        Sujata Ac MD

## 2023-04-14 NOTE — PROGRESS NOTES
HPI: This patient is a 38yo M who presents to clinic for evaluation of a thyroid mass and hemoptysis at the request of Dr. Dupree. He was recently in the hospital for hemoptysis. This occurred after having had covid and his partner notes that he was coughing so hard. The patient reports that he was given a medication at the hospital that he has been taking and he has not had any more hemoptysis since discharge. It was seen on a CT scan during that hospital visit that he has a large right thyroid nodule and some tracheal deviation. He has known about this for years and thinks it has slowly been growing over time. He denies SOB, TURNER, dysphagia, neck pain, etc.     Past medical history, surgical history, social history, family history, medications, and allergies have been reviewed with the patient and are documented above.    Review of Systems: a 10-system review was performed. Pertinent positives are noted in the HPI and on a separate scanned document in the chart.    PHYSICAL EXAMINATION:  GEN: no acute distress, normocephalic  EYES: extraocular movements are intact, pupils are equal and round. Sclera clear.   EARS: auricles are normally formed. The external auditory canals are clear with minimal to no cerumen. Tympanic membranes are intact bilaterally with no signs of infection, effusion, retractions, or perforations.  NOSE: anterior nares are patent. There are no masses or lesions. The septum is non-obstructing. Slightly dry, R>L.  OC/OP: clear, dentition is in good repair. The tongue and palate are fully mobile and symmetric. No masses or lesions identified.   HP/L (scope): nasopharynx, base of tongue, vallecula, epiglottis, and pyriform sinuses are clear. The bilateral vocal folds are mobile and without lesion  NECK: soft and supple. No lymphadenopathy. Airway is midline. Right thyroid is large, firm, and very nodular in texture. It moves with swallowing.  NEURO: CN VII and XII symmetric. alert and oriented. No  spontaneous nystagmus. Gait is normal.  PULM: breathing comfortably on room air, normal chest expansion with respiration  CARDS: no cyanosis or clubbing, normal carotid pulses    FLEXIBLE LARYNGOSCOPY: Scope inserted bilaterally to examine nasal tissue, nasopharynx, posterior oropharynx, hypopharynx, and larynx. See exam notes for exam finding details. Patient tolerated the procedure well.    CT CHEST 2023:  IMPRESSION:  1.  Markedly enlarged right lobe of the thyroid with tracheal deviation to the left of midline. Further evaluation with thyroid ultrasound suggested. (at least 5x4cm per report)  2.  Minimal intraluminal, tracheal component on the right where trachea is deviated most likely reflects adherent secretions rather than a polyp or an infiltrative component.  3.  No other abnormalities are seen to explain hemoptysis.  4.  No evidence of pulmonary emboli.    THYROID U/S 2017:  CONCLUSION:  1.  4.1 cm solid mass of the right thyroid lobe. Biopsy under ultrasound guidance suggested.  2.  Separate mostly cystic partly solid nodule at the right lower pole measuring 13 mm.  3.  Small subcutaneous cystic nodules of the left arm and left lower back correlating to palpable lumps are probably skin related lesion, possibly sebaceous cysts. Clinical follow-up suggested.    MEDICAL DECISION-MAKING: This patient is a 38yo M with a large thyroid nodule present for at least 6 years. The ultrasound that was recommended by the CT has not been ordered, nor has the patient had a thyroid biopsy of this in the past. These were ordered today and will be followed up on by another provider after my departure from [facility]. Given the degree of tracheal deviation, it would be reasonable to perform a right thyroid lobectomy regardless of pathology. However, an FNA can provide information that may steer the discussion of surgical intervention, melyssa vs total, etc. Referral sent for further care management at the .

## 2023-04-18 NOTE — TELEPHONE ENCOUNTER
FUTURE VISIT INFORMATION      FUTURE VISIT INFORMATION:    Date: 5/8/23    Time: 1:40PM    Location: [facility] [place]  REFERRAL INFORMATION:    Referring provider:  Sujata Ac MD    Referring providers clinic:  [facility]     Reason for visit/diagnosis  Thyroid nodule, apt per Pt's Spouse    RECORDS REQUESTED FROM:       Clinic name Comments Records Status Imaging Status   [facility]  4/14/23 OV Sujata Ac MD [facility] ED  3/3/23 ED note Jayjay Dupree MD [facility] Internal Med  3/3/23 OV Caitlin Purcell PA-C [facility]    Imaging  CT CHEST : 3/3/23  US Thyroid: 4/6/17  [facility] PACS

## 2023-05-08 ENCOUNTER — PRE VISIT (OUTPATIENT)
Dept: OTOLARYNGOLOGY | Facility: CLINIC | Age: 38
End: 2023-05-08

## 2023-05-08 ENCOUNTER — OFFICE VISIT (OUTPATIENT)
Dept: OTOLARYNGOLOGY | Facility: CLINIC | Age: 38
End: 2023-05-08
Attending: OTOLARYNGOLOGY
Payer: COMMERCIAL

## 2023-05-08 VITALS
WEIGHT: 151 LBS | TEMPERATURE: 98 F | BODY MASS INDEX: 25.78 KG/M2 | HEIGHT: 64 IN | DIASTOLIC BLOOD PRESSURE: 81 MMHG | SYSTOLIC BLOOD PRESSURE: 146 MMHG | HEART RATE: 77 BPM | OXYGEN SATURATION: 98 %

## 2023-05-08 DIAGNOSIS — E04.1 THYROID NODULE: Primary | ICD-10-CM

## 2023-05-08 PROCEDURE — 99215 OFFICE O/P EST HI 40 MIN: CPT | Mod: 25 | Performed by: STUDENT IN AN ORGANIZED HEALTH CARE EDUCATION/TRAINING PROGRAM

## 2023-05-08 PROCEDURE — 31575 DIAGNOSTIC LARYNGOSCOPY: CPT | Performed by: STUDENT IN AN ORGANIZED HEALTH CARE EDUCATION/TRAINING PROGRAM

## 2023-05-08 ASSESSMENT — PAIN SCALES - GENERAL: PAINLEVEL: NO PAIN (0)

## 2023-05-08 NOTE — PATIENT INSTRUCTIONS
1. Please schedule thyroid ultrasound and us guided FNA of thyroid nodule.   2. Please call the ENT clinic with any questions,concerns, new or worsening symptoms.    -Clinic number is [phone]   - Yue's direct line (Dr. Morales's nurse) [phone]

## 2023-05-08 NOTE — LETTER
5/8/2023       RE: Leonid Mcmillan  [address] [place] [address]     Dear Colleague,    Thank you for referring your patient, Leonid Mcmillan, to the [facility] EAR NOSE AND THROAT CLINIC [place] [facility]. Please see a copy of my visit note below.    HISTORY OF PRESENT ILLNESS: I had the pleasure of meeting Mr. Mcmillan and his wife today in clinic.  He is a pleasant 37-year-old male referred to me for evaluation of a right thyroid nodule.  He says this has been present for at least 10 years.  He does not think it is growing.  He has no symptoms related to it.  The first imaging in our system was from a chest CT from March 2023 when he presented with hemoptysis.  This showed a 5 x 4 cm right thyroid nodule with some mild tracheal deviation.  He saw Dr. Sujata Ac who recommended proceeding with a thyroid ultrasound and a biopsy; however, this has not been done.  He has no family history of thyroid cancer or radiation exposure to the neck.  TSH was checked in March and was normal.    PAST MEDICAL HISTORY:  None.    PAST SURGICAL HISTORY:  None.    MEDICATIONS:  None.    ALLERGIES:  No known drug allergies.    SOCIAL HISTORY:  He is a never smoker.  He does not drink alcohol.  No drugs.    FAMILY HISTORY:  None.    REVIEW OF SYSTEMS:  A 10-point review of system was performed and is negative except as noted in the HPI.    PHYSICAL EXAMINATION:  On examination, he is alert, in no acute distress.  He has an enlarged right thyroid lobe that is firm.  There is no palpable cervical adenopathy.  No lesions are seen in the oral cavity or oropharynx.    PROCEDURE:  Fiberoptic laryngoscopy was performed showing normal vocal cord mobility and no lesions.    ASSESSMENT AND PLAN:  A 37-year-old male presenting with a large right thyroid nodule.  I agree with Dr. Ac's recommendation for an ultrasound and likely biopsy.  We will arrange for these studies and we will determine  next steps based on the biopsy results.      Antonio Morales M.D.      Head and Neck Surgical Oncology and Microvascular Reconstruction  Department of Otolaryngology - Head and Neck Surgery  [facility]     45 minutes spent on the date of the encounter in chart review, patient visit, review of tests, documentation and/or discussion with other providers about the issues documented above.

## 2023-05-08 NOTE — NURSING NOTE
Chief Complaint   Patient presents with     Consult     Thyroid nodule        Sebastian Chavze LPN

## 2023-05-11 NOTE — PROGRESS NOTES
HISTORY OF PRESENT ILLNESS: I had the pleasure of meeting Mr. Mcmillan and his wife today in clinic.  He is a pleasant 37-year-old male referred to me for evaluation of a right thyroid nodule.  He says this has been present for at least 10 years.  He does not think it is growing.  He has no symptoms related to it.  The first imaging in our system was from a chest CT from March 2023 when he presented with hemoptysis.  This showed a 5 x 4 cm right thyroid nodule with some mild tracheal deviation.  He saw Dr. Sujata Ac who recommended proceeding with a thyroid ultrasound and a biopsy; however, this has not been done.  He has no family history of thyroid cancer or radiation exposure to the neck.  TSH was checked in March and was normal.    PAST MEDICAL HISTORY:  None.    PAST SURGICAL HISTORY:  None.    MEDICATIONS:  None.    ALLERGIES:  No known drug allergies.    SOCIAL HISTORY:  He is a never smoker.  He does not drink alcohol.  No drugs.    FAMILY HISTORY:  None.    REVIEW OF SYSTEMS:  A 10-point review of system was performed and is negative except as noted in the HPI.    PHYSICAL EXAMINATION:  On examination, he is alert, in no acute distress.  He has an enlarged right thyroid lobe that is firm.  There is no palpable cervical adenopathy.  No lesions are seen in the oral cavity or oropharynx.    PROCEDURE:  Fiberoptic laryngoscopy was performed showing normal vocal cord mobility and no lesions.    ASSESSMENT AND PLAN:  A 37-year-old male presenting with a large right thyroid nodule.  I agree with Dr. Ac's recommendation for an ultrasound and likely biopsy.  We will arrange for these studies and we will determine next steps based on the biopsy results.      Antonio Morales M.D.      Head and Neck Surgical Oncology and Microvascular Reconstruction  Department of Otolaryngology - Head and Neck Surgery  [facility]     45 minutes spent on the date of the encounter in chart review,  patient visit, review of tests, documentation and/or discussion with other providers about the issues documented above.

## 2023-05-14 ENCOUNTER — HEALTH MAINTENANCE LETTER (OUTPATIENT)
Age: 38
End: 2023-05-14

## 2023-05-19 ENCOUNTER — ANCILLARY PROCEDURE (OUTPATIENT)
Dept: ULTRASOUND IMAGING | Facility: CLINIC | Age: 38
End: 2023-05-19
Attending: STUDENT IN AN ORGANIZED HEALTH CARE EDUCATION/TRAINING PROGRAM
Payer: COMMERCIAL

## 2023-05-19 DIAGNOSIS — E04.1 THYROID NODULE: ICD-10-CM

## 2023-05-19 PROCEDURE — 76536 US EXAM OF HEAD AND NECK: CPT | Mod: GC | Performed by: RADIOLOGY

## 2023-05-24 DIAGNOSIS — E04.1 THYROID NODULE: Primary | ICD-10-CM

## 2023-05-25 ENCOUNTER — ANCILLARY PROCEDURE (OUTPATIENT)
Dept: ULTRASOUND IMAGING | Facility: CLINIC | Age: 38
End: 2023-05-25
Attending: STUDENT IN AN ORGANIZED HEALTH CARE EDUCATION/TRAINING PROGRAM
Payer: COMMERCIAL

## 2023-05-25 DIAGNOSIS — E04.1 THYROID NODULE: ICD-10-CM

## 2023-05-25 PROCEDURE — 10005 FNA BX W/US GDN 1ST LES: CPT | Performed by: SURGERY

## 2023-05-25 PROCEDURE — 88173 CYTOPATH EVAL FNA REPORT: CPT | Mod: 26 | Performed by: PATHOLOGY

## 2023-05-25 PROCEDURE — 88172 CYTP DX EVAL FNA 1ST EA SITE: CPT | Mod: TC | Performed by: STUDENT IN AN ORGANIZED HEALTH CARE EDUCATION/TRAINING PROGRAM

## 2023-05-25 PROCEDURE — 88172 CYTP DX EVAL FNA 1ST EA SITE: CPT | Mod: 26 | Performed by: PATHOLOGY

## 2023-05-25 RX ORDER — LIDOCAINE HYDROCHLORIDE 10 MG/ML
5 INJECTION, SOLUTION INFILTRATION; PERINEURAL ONCE
Status: COMPLETED | OUTPATIENT
Start: 2023-05-25 | End: 2023-05-25

## 2023-05-25 RX ADMIN — LIDOCAINE HYDROCHLORIDE 2 ML: 10 INJECTION, SOLUTION INFILTRATION; PERINEURAL at 09:28

## 2023-05-26 LAB
PATH REPORT.COMMENTS IMP SPEC: ABNORMAL
PATH REPORT.COMMENTS IMP SPEC: YES
PATH REPORT.FINAL DX SPEC: ABNORMAL
PATH REPORT.GROSS SPEC: ABNORMAL
PATH REPORT.RELEVANT HX SPEC: ABNORMAL

## 2023-06-05 ENCOUNTER — TELEPHONE (OUTPATIENT)
Dept: OTOLARYNGOLOGY | Facility: CLINIC | Age: 38
End: 2023-06-05
Payer: COMMERCIAL

## 2023-06-05 DIAGNOSIS — C73 MALIGNANT NEOPLASM OF THYROID GLAND (H): Primary | ICD-10-CM

## 2023-06-05 NOTE — TELEPHONE ENCOUNTER
Dr. Morales spoke with patient regarding the following pathology results:    Final Diagnosis   Specimen A                 Interpretation:                  Positive for malignancy  Morphologically consistent with papillary thyroid carcinoma (see comment)      The [place] implied risk of malignancy and recommended clinical management:  Positive for malignancy has a 97-99% risk of malignancy, recommended management is total or near-total thyroidectomy                  Adequacy:                 Satisfactory for evaluation       He would like for patient to obtain a neck ultrasound as well as CT neck.  Orders placed and will have schedulers call patient to arrange.       Yue James, RN, BSN

## 2023-06-05 NOTE — TELEPHONE ENCOUNTER
[facility] Call Center    Phone Message    May a detailed message be left on voicemail: yes     Reason for Call: Other: Pt spouse calling in they have not heard from anyone as far as an appointment is concerned for further testing. would like a call back. Thank you     Action Taken: Message routed to:  Cl[facility]ics [facility] Center ([facility]): [facility]    Travel Screening: Not Applicable

## 2023-06-06 ENCOUNTER — ANCILLARY PROCEDURE (OUTPATIENT)
Dept: ULTRASOUND IMAGING | Facility: CLINIC | Age: 38
End: 2023-06-06
Attending: STUDENT IN AN ORGANIZED HEALTH CARE EDUCATION/TRAINING PROGRAM
Payer: COMMERCIAL

## 2023-06-06 ENCOUNTER — ANCILLARY PROCEDURE (OUTPATIENT)
Dept: CT IMAGING | Facility: CLINIC | Age: 38
End: 2023-06-06
Attending: STUDENT IN AN ORGANIZED HEALTH CARE EDUCATION/TRAINING PROGRAM
Payer: COMMERCIAL

## 2023-06-06 DIAGNOSIS — C73 MALIGNANT NEOPLASM OF THYROID GLAND (H): ICD-10-CM

## 2023-06-06 PROCEDURE — 70491 CT SOFT TISSUE NECK W/DYE: CPT | Performed by: RADIOLOGY

## 2023-06-06 PROCEDURE — 76536 US EXAM OF HEAD AND NECK: CPT | Mod: GC | Performed by: RADIOLOGY

## 2023-06-06 RX ORDER — IOPAMIDOL 755 MG/ML
90 INJECTION, SOLUTION INTRAVASCULAR ONCE
Status: COMPLETED | OUTPATIENT
Start: 2023-06-06 | End: 2023-06-06

## 2023-06-06 RX ADMIN — IOPAMIDOL 90 ML: 755 INJECTION, SOLUTION INTRAVASCULAR at 16:51

## 2023-06-09 ENCOUNTER — TUMOR CONFERENCE (OUTPATIENT)
Dept: ONCOLOGY | Facility: CLINIC | Age: 38
End: 2023-06-09
Payer: COMMERCIAL

## 2023-06-09 NOTE — TUMOR CONFERENCE
Head & Neck Tumor Conference Note        Status: ***  Staff:  ***    Tumor Site: ***  Tumor Pathology: ***  Tumor Stage: ***  Tumor Treatment: ***    Reason for Review: Review imaging, path, and POC    Brief History: This is a 38 year old male referred for evaluation of a right thyroid nodule.  He says this has been present for at least 10 years.  He does not think it is growing.  He has no symptoms related to it.  The first imaging in our system was from a chest CT from March 2023 when he presented with hemoptysis.  This showed a 5 x 4 cm right thyroid nodule with some mild tracheal deviation.  He saw Dr. Sujata Ac who recommended proceeding with a thyroid ultrasound and a biopsy; however, this has not been done.  He has no family history of thyroid cancer or radiation exposure to the neck.  TSH was checked in March and was normal. Review of prior chest CT showed right lobe mass with some tracheal wall involvement.       Pertinent PMH: No past medical history on file.     Smoking Hx:   Social History     Tobacco Use     Smoking status: Never     Smokeless tobacco: Never     Imaging:   ***    Pathology:   ***    Tumor Board Recommendation:   Discussion:   There is protrusion of the mass into the tracheal wall lumen. There is blurry lines between the mass and the esophagus as well. Concern for invasion there too. He has no symptoms. Could get an MRI if it would change things.     Plan:   - arrange for flex bronch and EGD to ensure no transmural involvement of the esophagus. He will likely need a tracheal resection.     Susanne Low MD PGY-3  Otolaryngology- Head and Neck Surgery    Documentation / Disclaimer Cancer Tumor Board Note  Cancer tumor board recommendations do not override what is determined to be reasonable care and treatment, which is dependent on the circumstances of a patient's case; the patient's medical, social, and personal concerns; and the clinical judgment of the oncologist  [physician].

## 2023-06-13 ENCOUNTER — TELEPHONE (OUTPATIENT)
Dept: OTOLARYNGOLOGY | Facility: CLINIC | Age: 38
End: 2023-06-13
Payer: COMMERCIAL

## 2023-06-13 NOTE — TELEPHONE ENCOUNTER
[facility] Call Center    Phone Message    May a detailed message be left on voicemail: yes     Reason for Call: Other: Pt's wife called because they have not yet gotten a call back with test results that were done. Please call to discuss results of biopsy and CT. Thank you.       Action Taken: Message routed to:  [facility] ([facility]): ENT    Travel Screening: Not Applicable

## 2023-06-15 NOTE — TELEPHONE ENCOUNTER
Returned call and reviewed the following scan results with patient's wife:    IMPRESSION:   1. Right thyroid mass consistent with known papillary thyroid  carcinoma. Associated local mass effect with moderate narrowing of the  trachea.  2. No cervical adenopathy.       IMPRESSION:  Soft tissue neck ultrasound with lymph node measurements as described  above. No suspicious lymph nodes.    Reviewed tumor board discussion with patient's wife and need to proceed with Flexible bronch and EGD and return to clinic to discuss tracheal resection and thyroidectomy. Wife agreeable but would like to return to see Dr. Morales prior to proceeding with any scheduling. Patient will return to clinic on Monday 6/26 to discuss and we can plan for EGD/flexible bronch following that discussion. Patient and wife encouraged to call with further questions or concerns.       Yue James, RN, BSN     Discharged

## 2023-06-26 ENCOUNTER — OFFICE VISIT (OUTPATIENT)
Dept: OTOLARYNGOLOGY | Facility: CLINIC | Age: 38
End: 2023-06-26
Payer: COMMERCIAL

## 2023-06-26 ENCOUNTER — HOSPITAL ENCOUNTER (OUTPATIENT)
Dept: CT IMAGING | Facility: CLINIC | Age: 38
Discharge: HOME | End: 2023-06-26
Attending: STUDENT IN AN ORGANIZED HEALTH CARE EDUCATION/TRAINING PROGRAM | Admitting: STUDENT IN AN ORGANIZED HEALTH CARE EDUCATION/TRAINING PROGRAM
Payer: COMMERCIAL

## 2023-06-26 ENCOUNTER — PREP FOR PROCEDURE (OUTPATIENT)
Dept: OTOLARYNGOLOGY | Facility: CLINIC | Age: 38
End: 2023-06-26

## 2023-06-26 VITALS
HEART RATE: 68 BPM | SYSTOLIC BLOOD PRESSURE: 144 MMHG | HEIGHT: 64 IN | OXYGEN SATURATION: 97 % | WEIGHT: 151 LBS | DIASTOLIC BLOOD PRESSURE: 98 MMHG | BODY MASS INDEX: 25.78 KG/M2

## 2023-06-26 DIAGNOSIS — C73 MALIGNANT NEOPLASM OF THYROID GLAND (H): Primary | ICD-10-CM

## 2023-06-26 DIAGNOSIS — C73 MALIGNANT NEOPLASM OF THYROID GLAND (H): ICD-10-CM

## 2023-06-26 PROCEDURE — 31575 DIAGNOSTIC LARYNGOSCOPY: CPT | Performed by: STUDENT IN AN ORGANIZED HEALTH CARE EDUCATION/TRAINING PROGRAM

## 2023-06-26 PROCEDURE — 71250 CT THORAX DX C-: CPT

## 2023-06-26 PROCEDURE — 71250 CT THORAX DX C-: CPT | Mod: 26 | Performed by: RADIOLOGY

## 2023-06-26 PROCEDURE — 99214 OFFICE O/P EST MOD 30 MIN: CPT | Mod: 25 | Performed by: STUDENT IN AN ORGANIZED HEALTH CARE EDUCATION/TRAINING PROGRAM

## 2023-06-26 ASSESSMENT — PAIN SCALES - GENERAL: PAINLEVEL: NO PAIN (0)

## 2023-06-26 NOTE — PATIENT INSTRUCTIONS
Please schedule CT chest.       1. We will call you to arrange date/time for surgery  2. Please schedule a pre-op physical with their primary MD within 30 days of the procedure.   3. Please avoid blood thinning medications 1 week prior to surgery (Ibuprofen, Aleve, Aspirin, etc.)   4. Please review contents within the surgical packet & use the antiseptic scrub as directed.   5. Please call SANJAY Turner with any further questions [phone]

## 2023-06-26 NOTE — LETTER
6/26/2023       RE: Leonid Mcmillan  [address] [place] [address]     Dear Colleague,    Thank you for referring your patient, Leonid Mcmillan, to the [facility] EAR NOSE AND THROAT CLINIC [place] at [facility]. Please see a copy of my visit note below.    HISTORY OF PRESENT ILLNESS:  I had the pleasure of meeting Mr. Mcmillan back today in clinic.  He is a pleasant 38-year-old male admitted for evaluation of right thyroid nodule.  I got an updated CT scan.  On review of the scan, it appears consistent with a tracheal invasion.  I reviewed his case with our Multi-disciplinary Head and Neck Conference to review the imaging and our plan is to proceed with total thyroidectomy, central neck dissection of the tracheal site.  I would like to perform a flexible bronchoscopy and esophagoscopy preoperatively, mainly to assess for esophageal invasion but to assess the extent of transmural tracheal involvement.  He presents today for further discussion of this.      PHYSICAL EXAMINATION: On examination, he is breathing comfortably.  He has a firm thyroid nodule.    PROCEDURE:  Fiberoptic laryngoscopy was performed showing normal vocal cord mobility.  I am able to see into the trachea and you can see tumor emanating into the airway.    ASSESSMENT AND PLAN:  A 38-year-old male with T4 N0 papillary thyroid cancer of the right thyroid lobe with tracheal invasion.  I reviewed my recommendations with him for a total thyroidectomy and tracheal resection.  They understand the right recurrent laryngeal nerve may need to be sacrificed based on the extent of tumor seen on imaging.  He understands the potential need for t-tube or tracheostomy tube. They understand we do these cases jointly with thoracic surgery and I will message Dr Ballard to see if he can be a cosurgeon.     They understand the need for preoperative bronchoscopy and esophagoscopy. They understand that if the esophagus in involved  transmurally, the operation will be completely different and I will discuss this further with them after the endoscopy is performed.  They understand the need for adjuvant treatment.  We will have further discussion regarding risks of the operation after the endoscopy.  We will arrange for a noncontrast chest CT today.    30 minutes spent on the date of the encounter in chart review, patient visit, review of tests, documentation and/or discussion with other providers about the issues documented above asides from time spent doing flexible laryngoscopy.         Again, thank you for allowing me to participate in the care of your patient.      Sincerely,    Antonio Morales MD

## 2023-06-27 ENCOUNTER — TELEPHONE (OUTPATIENT)
Dept: OTOLARYNGOLOGY | Facility: CLINIC | Age: 38
End: 2023-06-27
Payer: COMMERCIAL

## 2023-06-27 NOTE — TELEPHONE ENCOUNTER
Patient is scheduled for surgery with Dr. Morales    Spoke with: Patients spouse, Helen.    Date of Surgery: 8/15/23    Location: [facility] OR     Pre op with Provider: SWATHI    H&P: Patients spouse said they do not have a PCP or PCP clinic they currently go to. Did offer to get patient scheduled with PAC, however they are wanting to schedule closer to home. Did ask that should they have trouble scheduling an appointment they give our office a call back so we may schedule patients pre-op with PAC. Patients spouse said at this time they are looking to schedule with a clinic in [facility]. Did also let patients spouse know this appointment will need to be within 30 days of scheduled surgery date.     Additional imaging/appointments: SWATHI    Surgery packet: Will mail surgery packet to patient. Did confirm with patients spouse that address on file works best.      Additional comments: Will go ahead and schedule patients 1 week post-op appointment.         Concha Mai on 6/27/2023 at 10:01 AM

## 2023-06-27 NOTE — TELEPHONE ENCOUNTER
Called patient with  to schedule surgery with Dr. Morales. Patient did not answer, but callback number, [phone], was left on patients vm.    Concha Mai on 6/27/2023 at 9:54 AM

## 2023-06-27 NOTE — TELEPHONE ENCOUNTER
Patients surgery has been rescheduled to 7/13/23 at [facility] with Dr. Morales due to urgency of case. Patient said they were worried about being able to get pre-op appointment in that short of a time frame so I did go ahead and get patient scheduled for PAC appointment on 6/29/23 at 2:45 PM.     Concha Mai on 6/27/2023 at 12:10 PM

## 2023-06-27 NOTE — TELEPHONE ENCOUNTER
FUTURE VISIT INFORMATION      SURGERY INFORMATION:    Date: 7/13/2023    Location: [facility] OR    Surgeon:  Antonio Morales MD    Anesthesia Type:  General    Procedure: Flexible bronchoscopy, ESOPHAGOSCOPY    Consult: 5/8/23    RECORDS REQUESTED FROM:         Most recent EKG+ Tracing: 3/3/23 - EPIC

## 2023-06-28 NOTE — PROGRESS NOTES
HISTORY OF PRESENT ILLNESS:  I had the pleasure of meeting Mr. Mcmillan back today in clinic.  He is a pleasant 38-year-old male admitted for evaluation of right thyroid nodule.  I got an updated CT scan.  On review of the scan, it appears consistent with a tracheal invasion.  I reviewed his case with our Multi-disciplinary Head and Neck Conference to review the imaging and our plan is to proceed with total thyroidectomy, central neck dissection of the tracheal site.  I would like to perform a flexible bronchoscopy and esophagoscopy preoperatively, mainly to assess for esophageal invasion but to assess the extent of transmural tracheal involvement.  He presents today for further discussion of this.      PHYSICAL EXAMINATION: On examination, he is breathing comfortably.  He has a firm thyroid nodule.    PROCEDURE:  Fiberoptic laryngoscopy was performed showing normal vocal cord mobility.  I am able to see into the trachea and you can see tumor emanating into the airway.    ASSESSMENT AND PLAN:  A 38-year-old male with T4 N0 papillary thyroid cancer of the right thyroid lobe with tracheal invasion.  I reviewed my recommendations with him for a total thyroidectomy and tracheal resection.  They understand the right recurrent laryngeal nerve may need to be sacrificed based on the extent of tumor seen on imaging.  He understands the potential need for t-tube or tracheostomy tube. They understand we do these cases jointly with thoracic surgery and I will message Dr Ballard to see if he can be a cosurgeon.     They understand the need for preoperative bronchoscopy and esophagoscopy. They understand that if the esophagus in involved transmurally, the operation will be completely different and I will discuss this further with them after the endoscopy is performed.  They understand the need for adjuvant treatment.  We will have further discussion regarding risks of the operation after the endoscopy.  We will arrange for a  noncontrast chest CT today.    30 minutes spent on the date of the encounter in chart review, patient visit, review of tests, documentation and/or discussion with other providers about the issues documented above asides from time spent doing flexible laryngoscopy.

## 2023-06-29 ENCOUNTER — PRE VISIT (OUTPATIENT)
Dept: SURGERY | Facility: CLINIC | Age: 38
End: 2023-06-29

## 2023-06-29 ENCOUNTER — ANESTHESIA EVENT (OUTPATIENT)
Dept: SURGERY | Facility: CLINIC | Age: 38
End: 2023-06-29
Payer: COMMERCIAL

## 2023-06-29 ENCOUNTER — OFFICE VISIT (OUTPATIENT)
Dept: SURGERY | Facility: CLINIC | Age: 38
End: 2023-06-29
Payer: COMMERCIAL

## 2023-06-29 VITALS
TEMPERATURE: 97.7 F | HEART RATE: 69 BPM | SYSTOLIC BLOOD PRESSURE: 126 MMHG | WEIGHT: 152 LBS | DIASTOLIC BLOOD PRESSURE: 93 MMHG | RESPIRATION RATE: 16 BRPM | BODY MASS INDEX: 25.95 KG/M2 | OXYGEN SATURATION: 96 % | HEIGHT: 64 IN

## 2023-06-29 DIAGNOSIS — Z01.818 PRE-OP EVALUATION: Primary | ICD-10-CM

## 2023-06-29 DIAGNOSIS — C73 MALIGNANT NEOPLASM OF THYROID GLAND (H): ICD-10-CM

## 2023-06-29 PROCEDURE — 99203 OFFICE O/P NEW LOW 30 MIN: CPT | Performed by: PHYSICIAN ASSISTANT

## 2023-06-29 ASSESSMENT — PAIN SCALES - GENERAL: PAINLEVEL: NO PAIN (0)

## 2023-06-29 NOTE — PATIENT INSTRUCTIONS
Preparing for Your Surgery      Name:  Leonid Mcmillan   MRN:  [id number]   :  1985   Today's Date:  2023       Arriving for surgery:  Surgery date:  23  Arrival time:  5:30am   Surgery time: 7:30am    Please come to:     Please come to:      [facility] [address]  [address] [place]  [address]      The [facility] Patient /Visitor Ramp is located at [address]. Patients and visitors who self-park will receive the reduced hospital parking rate. If the Patient /Visitor Ramp is full, please follow the signs to the  parking located at the main hospital entrance.     parking is available ( 24 hours/ 7 days a week)    Discounted parking pass options are available for patients and visitors. They can be purchased at the [employer] desk at the main hospital entrance.    -    Stop at the security desk and they will direct surgery patients to the 3rd floor Surgery Waiting Room. [phone]     -  If you are in need of directions, wheelchair or escort please stop at the Information/security desk in the lobby.       What can I eat or drink?  -  You may eat and drink normally up to 8 hours prior to arrival time. (Until 9:30pm on 23)  -  You may have clear liquids until 2 hours prior to arrival time. (Until 3:30am on 23)    Examples of clear liquids:  Water  Clear broth  Juices (apple, white grape, white cranberry  and cider) without pulp  Noncarbonated, powder based beverages  (lemonade and Horacio-Aid)  Sodas (Sprite, 7-Up, ginger ale and seltzer)  Coffee or tea (without milk or cream)  Gatorade    -  No Alcohol or cannabis products for at least 24 hours before surgery.     Which medicines can I take?    Hold Multivitamins for 7 days before surgery.  Hold Supplements for 7 days before surgery.  Hold Ibuprofen (Advil, Motrin) for 1 day(s) before surgery--unless otherwise directed by surgeon.  Hold Naproxen (Aleve)  for 4 days before surgery.          How do I prepare myself?  - Please take 2 showers (one the night prior to surgery and one the morning of surgery) using Scrubcare or Hibiclens soap.    Use this soap only from the neck to your toes.     Leave the soap on your skin for one minute--then rinse thoroughly.      You may use your own shampoo and conditioner. No other hair products.   - Please remove all jewelry and body piercings.  - No lotions, deodorants or fragrance.  - Bring your ID and insurance card.    -If you have a Deep Brain Stimulator, Spinal Cord Stimulator, or any Neuro Stimulator device---you must bring the remote control to the hospital.      ALL PATIENTS GOING HOME THE SAME DAY OF SURGERY ARE REQUIRED TO HAVE A RESPONSIBLE ADULT TO DRIVE AND BE IN ATTENDANCE WITH THEM FOR 24 HOURS FOLLOWING SURGERY.    Covid testing policy as of 12/06/2022  Your surgeon will notify and schedule you for a COVID test if one is needed before surgery--please direct any questions or COVID symptoms to your surgeon      Questions or Concerns:    - For any questions regarding the day of surgery or your hospital stay, please contact the Pre Admission Nursing Office at [phone].       - If you have health changes between today and your surgery, please call your surgeon.       - For questions after surgery, please call your surgeons office.           Current Visitor Guidelines    You may have 2 visitors in the pre op area.    Visiting hours: 8 a.m. to 8:30 p.m.    You may have four visitors during your inpatient hospital stay.    Patients confirmed or suspected to have symptoms of COVID 19 or flu:     No visitors allowed for adult patients.   Children (under age 18) can have 1 named visitor.     People who are sick or showing symptoms of COVID 19 or flu:    Are not allowed to visit patients--we can only make exceptions in special situations.       Please follow these guidelines for your visit:          Please maintain social  distance          Masking is optional--however at times you may be asked to wear a mask for the safety of yourself and others     Clean your hands with alcohol hand . Do this when you arrive at and leave the building and patient room,    And again after you touch your mask or anything in the room.     Go directly to and from the room you are visiting.     Stay in the patient s room during your visit. Limit going to other places in the hospital as much as possible     Leave bags and jackets at home or in the car.     For everyone s health, please don t come and go during your visit. That includes for smoking   during your visit.

## 2023-06-29 NOTE — H&P
Pre-Operative H & P     CC:  Preoperative exam to assess for increased cardiopulmonary risk while undergoing surgery and anesthesia.    Date of Encounter: 6/29/2023  Primary Care Physician:  No Ref-Primary, Physician     Reason for visit:   Encounter Diagnoses   Name Primary?     Pre-op evaluation Yes     Malignant neoplasm of thyroid gland (H)        ARABELLA Mcmillan is a 38 year old male with no past medical history, who presents for pre-operative H & P in preparation for  Procedure Information     Case: [id number] Date/Time: 07/13/23 0730    Procedures:       Flexible bronchoscopy (Throat)      ESOPHAGOSCOPY (Esophagus)    Anesthesia type: General    Diagnosis: Malignant neoplasm of thyroid gland (H) [C73]    Pre-op diagnosis: Malignant neoplasm of thyroid gland (H) [C73]    Location:  OR 09 /  OR    Providers: Antonio Morales MD          He was evaluated in the ED in March due to severe cough and hemoptysis. Imaging completed during that visit revealed a thyroid nodule with mild tracheal deviation. He has since been evaluated by ENT and further evaluation including an ultrasound guided biopsy of the nodule was completed. This was positive for malignancy. He was seen in follow-up with Dr. Morales on 6/26/23 and is now scheduled for the procedure as above.      History is obtained from the patient, his wife and chart review. A professional  was offered for this appointment. The patient declined and wife interpreting for him, waiver signed.    Hx of abnormal bleeding or anti-platelet use: None      Past Medical History  Past Medical History:   Diagnosis Date     Thyroid cancer (H)        Past Surgical History  Past Surgical History:   Procedure Laterality Date     SHOULDER SURGERY Right 2005    shoulder injury, did not have general anesthesia       Prior to Admission Medications  No current outpatient medications on file.       Allergies  No Known Allergies    Social History  Social History      Socioeconomic History     Marital status:      Spouse name: Not on file     Number of children: Not on file     Years of education: Not on file     Highest education level: Not on file   Occupational History     Not on file   Tobacco Use     Smoking status: Never     Smokeless tobacco: Never   Substance and Sexual Activity     Alcohol use: Never     Drug use: Never     Sexual activity: Not on file   Other Topics Concern     Not on file   Social History Narrative     Not on file     Social Determinants of Health     Financial Resource Strain: Not on file   Food Insecurity: Not on file   Transportation Needs: Not on file   Physical Activity: Not on file   Stress: Not on file   Social Connections: Not on file   Intimate Partner Violence: Not on file   Housing Stability: Not on file       Family History  Family History   Problem Relation Age of Onset     Anesthesia Reaction No family hx of      Venous thrombosis No family hx of      Myocardial Infarction No family hx of      Cerebrovascular Disease No family hx of        Review of Systems  The complete review of systems is negative other than noted in the HPI or here.   Anesthesia Evaluation   Pt has had prior anesthetic.     No history of anesthetic complications       ROS/MED HX  ENT/Pulmonary:  - neg pulmonary ROS     Neurologic:  - neg neurologic ROS     Cardiovascular:  - neg cardiovascular ROS   (+) -----Previous cardiac testing   Echo: Date: Results:    Stress Test: Date: Results:    ECG Reviewed: Date: 3/3/2023 Results:  Sinus rhythm with sinus arrhythmia   Normal ECG  Cath: Date: Results:      METS/Exercise Tolerance: >4 METS Comment: Works out 3 times per week   Hematologic:  - neg hematologic  ROS     Musculoskeletal: Comment: History of right shoulder injury 2005   (-) arthritis   GI/Hepatic: Comment: History of acid reflux, resolved after lifestyle changes ~6 month ago   (-) liver disease   Renal/Genitourinary:  - neg Renal ROS     Endo:     (+)  "thyroid problem,  Thyroid disease - Other,  (-) Type II DM   Psychiatric/Substance Use:  - neg psychiatric ROS     Infectious Disease: Comment: COVID 2/2023, no residual symptoms      Malignancy:   (+) Malignancy, History of Other.Other CA Thyroid cancer Active status post.    Other:  - neg other ROS          BP (!) 126/93 (BP Location: Right arm, Patient Position: Sitting, Cuff Size: Adult Regular)   Pulse 69   Temp 97.7  F (36.5  C) (Oral)   Resp 16   Ht 1.626 m (5' 4\")   Wt 68.9 kg (152 lb)   SpO2 96%   BMI 26.09 kg/m      Physical Exam   Constitutional: Pleasant, well-appearing male, no apparent distress, and appears stated age.  Eyes: Pupils equal, round and reactive to light, extra ocular muscles intact, sclera clear, conjunctiva normal.  HENT: Normocephalic and atraumatic, oral pharynx with moist mucus membranes, good dentition. Large thyroid mass  Respiratory: Clear to auscultation bilaterally, no crackles or wheezing.  Cardiovascular: Regular rate and rhythm, normal S1 and S2, and no murmur noted.  Carotids +2, no bruits. No edema. Palpable pulses to radial  DP and PT arteries.   GI: Normal bowel sounds, soft, non-distended, non-tender, no masses palpated, no hepatosplenomegaly.  Lymph/Hematologic: No cervical lymphadenopathy and no supraclavicular lymphadenopathy.  Genitourinary:  Deferred  Skin: Warm and dry.  No rashes on exposed skin   Musculoskeletal: Full ROM of neck. There is no redness, warmth, or swelling of the visible joints. Gross motor strength is normal.    Neurologic: Awake, alert, oriented to name, place and time. Cranial nerves II-XII are grossly intact. Gait is normal.   Neuropsychiatric: Calm, cooperative. Normal affect.      Prior Labs/Diagnostic Studies   All labs and imaging personally reviewed      Latest Reference Range & Units 03/03/23 18:09   Sodium 136 - 145 mmol/L 140   Potassium 3.5 - 5.0 mmol/L 4.2   Chloride 98 - 107 mmol/L 108 (H)   Carbon Dioxide 22 - 31 mmol/L 25 "   Urea Nitrogen 8 - 22 mg/dL 16   Creatinine 0.70 - 1.30 mg/dL 0.86   GFR Estimate >60 mL/min/1.73m2 >90   Calcium 8.5 - 10.5 mg/dL 8.7   Anion Gap 5 - 18 mmol/L 7   Albumin 3.5 - 5.0 g/dL 3.9   Protein Total 6.0 - 8.0 g/dL 7.6   Alkaline Phosphatase 45 - 120 U/L 63   ALT 0 - 45 U/L 26   AST 0 - 40 U/L 20   Bilirubin Total 0.0 - 1.0 mg/dL 0.3   Troponin I 0.00 - 0.29 ng/mL <0.01   TSH 0.30 - 5.00 uIU/mL 1.07   Glucose 70 - 125 mg/dL 103   WBC 4.0 - 11.0 10e3/uL 5.6   Hemoglobin 13.3 - 17.7 g/dL 14.8   Hematocrit 40.0 - 53.0 % 44.1   Platelet Count 150 - 450 10e3/uL 203   RBC Count 4.40 - 5.90 10e6/uL 5.13   MCV 78 - 100 fL 86   MCH 26.5 - 33.0 pg 28.8   MCHC 31.5 - 36.5 g/dL 33.6   RDW 10.0 - 15.0 % 12.1   % Neutrophils % 63   % Lymphocytes % 26   % Monocytes % 8   % Eosinophils % 2   % Basophils % 1   Absolute Basophils 0.0 - 0.2 10e3/uL 0.0   Absolute Eosinophils 0.0 - 0.7 10e3/uL 0.1   Absolute Immature Granulocytes <=0.4 10e3/uL 0.0   Absolute Lymphocytes 0.8 - 5.3 10e3/uL 1.5   Absolute Monocytes 0.0 - 1.3 10e3/uL 0.5   % Immature Granulocytes % 0   Absolute Neutrophils 1.6 - 8.3 10e3/uL 3.6   Absolute NRBCs 10e3/uL 0.0   NRBCs per 100 WBC <1 /100 0   INR 0.85 - 1.15  1.06   PTT 22 - 38 Seconds 29   D-Dimer Quantitative 0.00 - 0.50 ug/mL FEU <=0.27   (H): Data is abnormally high    EKG/ stress test - if available please see in ROS above   No results found.    The patient's records and results personally reviewed by this provider.     Outside records reviewed from: Care Everywhere    LAB/DIAGNOSTIC STUDIES TODAY:  None    Assessment  Leonid Deyanira is a 38 year old male seen as a PAC referral for risk assessment and optimization for anesthesia.    Plan/Recommendations  Pt will be optimized for the proposed procedure.  See below for details on the assessment, risk, and preoperative recommendations    NEUROLOGY  - No history of TIA, CVA or seizure    -Post Op delirium risk factors:  No risk identified    ENT  - Large  "thyroid mass with radiographic evidence of narrowing and leftward deviation of the trachea. Patient examined by Dr. Limon today, no major airway concerns but will need to be reassessed day of surgery.  Mallampati: II  TM: > 3    CARDIAC  - No history of CAD, Hypertension and Afib  - METS (Metabolic Equivalents)  Patient performs 4 or more METS exercise without symptoms            Total Score: 0      RCRI-Very low risk: Class 1 0.4% complication rate            Total Score: 0        PULMONARY    ROSEANNA Low Risk            Total Score: 1    ROSEANNA: Male      - Denies asthma or inhaler use  - Tobacco History    History   Smoking Status     Never   Smokeless Tobacco     Never       GI  - GERD  Patient made lifestyle modifications about 6 months ago and has not had symptoms since.  PONV Low Risk  Total Score: 1           1 AN PONV: Patient is not a current smoker        /RENAL  - Baseline Creatinine  WNL    ENDOCRINE    - BMI: Estimated body mass index is 26.09 kg/m  as calculated from the following:    Height as of this encounter: 1.626 m (5' 4\").    Weight as of this encounter: 68.9 kg (152 lb).  Overweight (BMI 25.0-29.9)  - No history of Diabetes Mellitus    HEME  VTE Medium Risk 1.8%            Total Score: 7    VTE: Male    VTE: Current cancer      - No history of abnormal bleeding or antiplatelet use.      MSK  - History of right rotator cuff injury s/p surgical repair 2005 in [facility]      Different anesthesia methods/types have been discussed with the patient, but they are aware that the final plan will be decided by the assigned anesthesia provider on the date of service.  Patient was discussed with Dr. Limon    The patient is optimized for their procedure. AVS with information on surgery time/arrival time, meds and NPO status given by nursing staff. No further diagnostic testing indicated.      On the day of service:     Prep time: 10 minutes  Visit time: 20 minutes  Documentation time: 5 " minutes  ------------------------------------------  Total time: 35 minutes      Catalina Eaton PA-C  Preoperative Assessment Center  [facility]  Clinic and Surgery Center  Phone: [phone]  Fax: [phone]

## 2023-07-05 ENCOUNTER — TELEPHONE (OUTPATIENT)
Dept: SURGERY | Facility: CLINIC | Age: 38
End: 2023-07-05
Payer: COMMERCIAL

## 2023-07-05 NOTE — TELEPHONE ENCOUNTER
Spoke with pt's wife who was requesting doctors note for 7/11 appointment with Dr. Ballard. Discussed that we will be able to provide a letter at the time of the clinic visit. No further questions or concerns.    Radha Santana, RN BSN  Thoracic Surgery RN Care Coordinator  [phone]

## 2023-07-11 ENCOUNTER — ONCOLOGY VISIT (OUTPATIENT)
Dept: SURGERY | Facility: CLINIC | Age: 38
End: 2023-07-11
Attending: THORACIC SURGERY (CARDIOTHORACIC VASCULAR SURGERY)
Payer: COMMERCIAL

## 2023-07-11 VITALS
RESPIRATION RATE: 16 BRPM | BODY MASS INDEX: 26.07 KG/M2 | WEIGHT: 152.7 LBS | OXYGEN SATURATION: 99 % | SYSTOLIC BLOOD PRESSURE: 124 MMHG | HEART RATE: 64 BPM | DIASTOLIC BLOOD PRESSURE: 83 MMHG | HEIGHT: 64 IN

## 2023-07-11 DIAGNOSIS — C73 THYROID CANCER (H): ICD-10-CM

## 2023-07-11 PROCEDURE — 99213 OFFICE O/P EST LOW 20 MIN: CPT | Performed by: THORACIC SURGERY (CARDIOTHORACIC VASCULAR SURGERY)

## 2023-07-11 PROCEDURE — 99203 OFFICE O/P NEW LOW 30 MIN: CPT | Performed by: THORACIC SURGERY (CARDIOTHORACIC VASCULAR SURGERY)

## 2023-07-11 ASSESSMENT — PAIN SCALES - GENERAL: PAINLEVEL: NO PAIN (0)

## 2023-07-11 NOTE — PROGRESS NOTES
"THORACIC SURGERY - NEW PATIENT OFFICE VISIT      Dear ELKE Jha,    I saw Leonid Mcmillan at Dr. Morales s request in consultation for the evaluation and treatment of a thyroid cancer invading the trachea.     HPI  Leonid Mcmillan is a 38 year old male with a papillary thyroid cancer which appears to be invading the trachea.     Previsit Tests   CT neck/chest 6/26/2023:        PMH  Reviewed, as below    Past Medical History:   Diagnosis Date     Thyroid cancer (H)         PSH  Reviewed, as below    Past Surgical History:   Procedure Laterality Date     SHOULDER SURGERY Right 2005    shoulder injury, did not have general anesthesia        ETOH:  None  TOB:  Never smoker    Physical examination  /83   Pulse 64   Resp 16   Ht 1.626 m (5' 4\")   Wt 69.3 kg (152 lb 11.2 oz)   SpO2 99%   BMI 26.21 kg/m     /83   Pulse 64   Resp 16   Ht 1.626 m (5' 4\")   Wt 69.3 kg (152 lb 11.2 oz)   SpO2 99%   BMI 26.21 kg/m     Physical Exam  Constitutional:       Appearance: Normal appearance.   Eyes:      Conjunctiva/sclera: Conjunctivae normal.   Cardiovascular:      Rate and Rhythm: Normal rate.   Pulmonary:      Effort: Pulmonary effort is normal.   Neurological:      Mental Status: He is alert and oriented to person, place, and time.   Psychiatric:         Mood and Affect: Mood normal.         Behavior: Behavior normal.         Thought Content: Thought content normal.         Judgment: Judgment normal.          From a personal perspective, he is here with his wife. He is a .    IMPRESSION (C73) Thyroid cancer (H)     This person is a 38 year old male with a papillary thyroid cancer with likely tracheal invasion. He appears to be a good surgical candidate.    PLAN  I spent 30 min on the date of the encounter in chart review, patient visit, review of tests, documentation and/or discussion with other providers about the issues documented above. I reviewed the plan as follows:    Procedure planned: Tracheal resection if " needed.  He will undergo bronchoscopy with Dr. Morales on 7/13.       I discussed the risks and benefits of the operation, including the new connection (anastomosis) breaking down, need for reoperation, infection, recurrent stenosis.       I appreciate the opportunity to participate in the care of your patient and will keep you updated.    Sincerely,    Pieter Ballard MD

## 2023-07-11 NOTE — NURSING NOTE
"Oncology Rooming Note    July 11, 2023 4:29 PM   Leonid Mcmillan is a 38 year old male who presents for:    Chief Complaint   Patient presents with     Oncology Clinic Visit     RTN for Trachial Anastomosis      Initial Vitals: Blood Pressure 124/83   Pulse 64   Respiration 16   Height 1.626 m (5' 4\")   Weight 69.3 kg (152 lb 11.2 oz)   Oxygen Saturation 99%   Body Mass Index 26.21 kg/m   Estimated body mass index is 26.21 kg/m  as calculated from the following:    Height as of this encounter: 1.626 m (5' 4\").    Weight as of this encounter: 69.3 kg (152 lb 11.2 oz). Body surface area is 1.77 meters squared.  No Pain (0) Comment: Data Unavailable   No LMP for male patient.  Allergies reviewed: Yes  Medications reviewed: Yes    Medications: Medication refills not needed today.  Pharmacy name entered into [employer] ([email or web address]): [id number] - [place], [place] - [address] AT [facility]    Clinical concerns: none       Quyen Foley MA            "

## 2023-07-11 NOTE — LETTER
"    7/11/2023         RE: Leonid Mcmillan  [address] [place] [address]        Dear Colleague,    Thank you for referring your patient, Leonid Mcmillan, to the [facility]. Please see a copy of my visit note below.    THORACIC SURGERY - NEW PATIENT OFFICE VISIT      Dear ELKE Jha,    I saw Leonid Mcmillan at Dr. Morales s request in consultation for the evaluation and treatment of a thyroid cancer invading the trachea.     HPI  Leonid Mcmillan is a 38 year old male with a papillary thyroid cancer which appears to be invading the trachea.     Previsit Tests   CT neck/chest 6/26/2023:        PMH  Reviewed, as below    Past Medical History:   Diagnosis Date    Thyroid cancer (H)         PSH  Reviewed, as below    Past Surgical History:   Procedure Laterality Date    SHOULDER SURGERY Right 2005    shoulder injury, did not have general anesthesia        ETOH:  None  TOB:  Never smoker    Physical examination  /83   Pulse 64   Resp 16   Ht 1.626 m (5' 4\")   Wt 69.3 kg (152 lb 11.2 oz)   SpO2 99%   BMI 26.21 kg/m     /83   Pulse 64   Resp 16   Ht 1.626 m (5' 4\")   Wt 69.3 kg (152 lb 11.2 oz)   SpO2 99%   BMI 26.21 kg/m     Physical Exam  Constitutional:       Appearance: Normal appearance.   Eyes:      Conjunctiva/sclera: Conjunctivae normal.   Cardiovascular:      Rate and Rhythm: Normal rate.   Pulmonary:      Effort: Pulmonary effort is normal.   Neurological:      Mental Status: He is alert and oriented to person, place, and time.   Psychiatric:         Mood and Affect: Mood normal.         Behavior: Behavior normal.         Thought Content: Thought content normal.         Judgment: Judgment normal.          From a personal perspective, he is here with his wife. He is a .    IMPRESSION (C73) Thyroid cancer (H)     This person is a 38 year old male with a papillary thyroid cancer with likely tracheal invasion. He appears to be a good surgical candidate.    PLAN  I spent 30 min on the " date of the encounter in chart review, patient visit, review of tests, documentation and/or discussion with other providers about the issues documented above. I reviewed the plan as follows:    Procedure planned: Tracheal resection if needed.  He will undergo bronchoscopy with Dr. Morales on 7/13.       I discussed the risks and benefits of the operation, including the new connection (anastomosis) breaking down, need for reoperation, infection, recurrent stenosis.       I appreciate the opportunity to participate in the care of your patient and will keep you updated.    Sincerely,    Pieter Ballard MD

## 2023-07-13 ENCOUNTER — HOSPITAL ENCOUNTER (OUTPATIENT)
Facility: CLINIC | Age: 38
Discharge: HOME | End: 2023-07-13
Attending: STUDENT IN AN ORGANIZED HEALTH CARE EDUCATION/TRAINING PROGRAM | Admitting: STUDENT IN AN ORGANIZED HEALTH CARE EDUCATION/TRAINING PROGRAM
Payer: COMMERCIAL

## 2023-07-13 ENCOUNTER — ANESTHESIA (OUTPATIENT)
Dept: SURGERY | Facility: CLINIC | Age: 38
End: 2023-07-13
Payer: COMMERCIAL

## 2023-07-13 VITALS
HEART RATE: 77 BPM | HEIGHT: 64 IN | BODY MASS INDEX: 25.89 KG/M2 | OXYGEN SATURATION: 95 % | SYSTOLIC BLOOD PRESSURE: 117 MMHG | WEIGHT: 151.68 LBS | DIASTOLIC BLOOD PRESSURE: 84 MMHG | TEMPERATURE: 98.9 F | RESPIRATION RATE: 16 BRPM

## 2023-07-13 DIAGNOSIS — C73 THYROID CANCER (H): Primary | ICD-10-CM

## 2023-07-13 PROCEDURE — 999N000141 HC STATISTIC PRE-PROCEDURE NURSING ASSESSMENT: Performed by: STUDENT IN AN ORGANIZED HEALTH CARE EDUCATION/TRAINING PROGRAM

## 2023-07-13 PROCEDURE — 88305 TISSUE EXAM BY PATHOLOGIST: CPT | Mod: TC | Performed by: STUDENT IN AN ORGANIZED HEALTH CARE EDUCATION/TRAINING PROGRAM

## 2023-07-13 PROCEDURE — 43200 ESOPHAGOSCOPY FLEXIBLE BRUSH: CPT | Mod: 51 | Performed by: STUDENT IN AN ORGANIZED HEALTH CARE EDUCATION/TRAINING PROGRAM

## 2023-07-13 PROCEDURE — 250N000009 HC RX 250: Performed by: NURSE ANESTHETIST, CERTIFIED REGISTERED

## 2023-07-13 PROCEDURE — 272N000001 HC OR GENERAL SUPPLY STERILE: Performed by: STUDENT IN AN ORGANIZED HEALTH CARE EDUCATION/TRAINING PROGRAM

## 2023-07-13 PROCEDURE — 258N000003 HC RX IP 258 OP 636: Performed by: NURSE ANESTHETIST, CERTIFIED REGISTERED

## 2023-07-13 PROCEDURE — 360N000076 HC SURGERY LEVEL 3, PER MIN: Performed by: STUDENT IN AN ORGANIZED HEALTH CARE EDUCATION/TRAINING PROGRAM

## 2023-07-13 PROCEDURE — 370N000017 HC ANESTHESIA TECHNICAL FEE, PER MIN: Performed by: STUDENT IN AN ORGANIZED HEALTH CARE EDUCATION/TRAINING PROGRAM

## 2023-07-13 PROCEDURE — 710N000010 HC RECOVERY PHASE 1, LEVEL 2, PER MIN: Performed by: STUDENT IN AN ORGANIZED HEALTH CARE EDUCATION/TRAINING PROGRAM

## 2023-07-13 PROCEDURE — 88305 TISSUE EXAM BY PATHOLOGIST: CPT | Mod: 26 | Performed by: PATHOLOGY

## 2023-07-13 PROCEDURE — 710N000012 HC RECOVERY PHASE 2, PER MINUTE: Performed by: STUDENT IN AN ORGANIZED HEALTH CARE EDUCATION/TRAINING PROGRAM

## 2023-07-13 PROCEDURE — 250N000011 HC RX IP 250 OP 636: Performed by: NURSE ANESTHETIST, CERTIFIED REGISTERED

## 2023-07-13 PROCEDURE — 250N000009 HC RX 250: Performed by: STUDENT IN AN ORGANIZED HEALTH CARE EDUCATION/TRAINING PROGRAM

## 2023-07-13 PROCEDURE — 250N000011 HC RX IP 250 OP 636: Mod: JZ | Performed by: STUDENT IN AN ORGANIZED HEALTH CARE EDUCATION/TRAINING PROGRAM

## 2023-07-13 PROCEDURE — 31535 LARYNGOSCOPY W/BIOPSY: CPT | Mod: GC | Performed by: STUDENT IN AN ORGANIZED HEALTH CARE EDUCATION/TRAINING PROGRAM

## 2023-07-13 RX ORDER — OXYCODONE HYDROCHLORIDE 10 MG/1
10 TABLET ORAL
Status: CANCELLED | OUTPATIENT
Start: 2023-07-13

## 2023-07-13 RX ORDER — HYDROMORPHONE HCL IN WATER/PF 6 MG/30 ML
0.4 PATIENT CONTROLLED ANALGESIA SYRINGE INTRAVENOUS EVERY 5 MIN PRN
Status: DISCONTINUED | OUTPATIENT
Start: 2023-07-13 | End: 2023-07-13 | Stop reason: HOSPADM

## 2023-07-13 RX ORDER — LIDOCAINE HYDROCHLORIDE 10 MG/ML
INJECTION, SOLUTION EPIDURAL; INFILTRATION; INTRACAUDAL; PERINEURAL PRN
Status: DISCONTINUED | OUTPATIENT
Start: 2023-07-13 | End: 2023-07-13 | Stop reason: HOSPADM

## 2023-07-13 RX ORDER — FENTANYL CITRATE 50 UG/ML
25 INJECTION, SOLUTION INTRAMUSCULAR; INTRAVENOUS EVERY 5 MIN PRN
Status: DISCONTINUED | OUTPATIENT
Start: 2023-07-13 | End: 2023-07-13 | Stop reason: HOSPADM

## 2023-07-13 RX ORDER — FENTANYL CITRATE 50 UG/ML
INJECTION, SOLUTION INTRAMUSCULAR; INTRAVENOUS PRN
Status: DISCONTINUED | OUTPATIENT
Start: 2023-07-13 | End: 2023-07-13

## 2023-07-13 RX ORDER — SODIUM CHLORIDE, SODIUM LACTATE, POTASSIUM CHLORIDE, CALCIUM CHLORIDE 600; 310; 30; 20 MG/100ML; MG/100ML; MG/100ML; MG/100ML
INJECTION, SOLUTION INTRAVENOUS CONTINUOUS
Status: DISCONTINUED | OUTPATIENT
Start: 2023-07-13 | End: 2023-07-13 | Stop reason: HOSPADM

## 2023-07-13 RX ORDER — SODIUM CHLORIDE, SODIUM LACTATE, POTASSIUM CHLORIDE, CALCIUM CHLORIDE 600; 310; 30; 20 MG/100ML; MG/100ML; MG/100ML; MG/100ML
INJECTION, SOLUTION INTRAVENOUS CONTINUOUS PRN
Status: DISCONTINUED | OUTPATIENT
Start: 2023-07-13 | End: 2023-07-13

## 2023-07-13 RX ORDER — DEXAMETHASONE SODIUM PHOSPHATE 10 MG/ML
10 INJECTION, SOLUTION INTRAMUSCULAR; INTRAVENOUS ONCE
Status: COMPLETED | OUTPATIENT
Start: 2023-07-13 | End: 2023-07-13

## 2023-07-13 RX ORDER — OXYCODONE HYDROCHLORIDE 5 MG/1
5 TABLET ORAL
Status: CANCELLED | OUTPATIENT
Start: 2023-07-13

## 2023-07-13 RX ORDER — ONDANSETRON 4 MG/1
4 TABLET, ORALLY DISINTEGRATING ORAL EVERY 30 MIN PRN
Status: DISCONTINUED | OUTPATIENT
Start: 2023-07-13 | End: 2023-07-13 | Stop reason: HOSPADM

## 2023-07-13 RX ORDER — OXYMETAZOLINE HYDROCHLORIDE 0.05 G/100ML
SPRAY NASAL PRN
Status: DISCONTINUED | OUTPATIENT
Start: 2023-07-13 | End: 2023-07-13 | Stop reason: HOSPADM

## 2023-07-13 RX ORDER — FENTANYL CITRATE 50 UG/ML
50 INJECTION, SOLUTION INTRAMUSCULAR; INTRAVENOUS EVERY 5 MIN PRN
Status: DISCONTINUED | OUTPATIENT
Start: 2023-07-13 | End: 2023-07-13 | Stop reason: HOSPADM

## 2023-07-13 RX ORDER — GLYCOPYRROLATE 0.2 MG/ML
INJECTION, SOLUTION INTRAMUSCULAR; INTRAVENOUS PRN
Status: DISCONTINUED | OUTPATIENT
Start: 2023-07-13 | End: 2023-07-13

## 2023-07-13 RX ORDER — ONDANSETRON 4 MG/1
4 TABLET, ORALLY DISINTEGRATING ORAL EVERY 30 MIN PRN
Status: CANCELLED | OUTPATIENT
Start: 2023-07-13

## 2023-07-13 RX ORDER — ONDANSETRON 2 MG/ML
4 INJECTION INTRAMUSCULAR; INTRAVENOUS EVERY 30 MIN PRN
Status: CANCELLED | OUTPATIENT
Start: 2023-07-13

## 2023-07-13 RX ORDER — ACETAMINOPHEN 325 MG/1
650 TABLET ORAL
Status: CANCELLED | OUTPATIENT
Start: 2023-07-13

## 2023-07-13 RX ORDER — PROPOFOL 10 MG/ML
INJECTION, EMULSION INTRAVENOUS PRN
Status: DISCONTINUED | OUTPATIENT
Start: 2023-07-13 | End: 2023-07-13

## 2023-07-13 RX ORDER — DEXAMETHASONE SODIUM PHOSPHATE 4 MG/ML
INJECTION, SOLUTION INTRA-ARTICULAR; INTRALESIONAL; INTRAMUSCULAR; INTRAVENOUS; SOFT TISSUE PRN
Status: DISCONTINUED | OUTPATIENT
Start: 2023-07-13 | End: 2023-07-13

## 2023-07-13 RX ORDER — ONDANSETRON 2 MG/ML
INJECTION INTRAMUSCULAR; INTRAVENOUS PRN
Status: DISCONTINUED | OUTPATIENT
Start: 2023-07-13 | End: 2023-07-13

## 2023-07-13 RX ORDER — LIDOCAINE 40 MG/G
CREAM TOPICAL
Status: DISCONTINUED | OUTPATIENT
Start: 2023-07-13 | End: 2023-07-13 | Stop reason: HOSPADM

## 2023-07-13 RX ORDER — OXYCODONE HYDROCHLORIDE 5 MG/1
5 TABLET ORAL EVERY 4 HOURS PRN
Qty: 5 TABLET | Refills: 0 | Status: SHIPPED | OUTPATIENT
Start: 2023-07-13 | End: 2023-08-11

## 2023-07-13 RX ORDER — HYDROMORPHONE HCL IN WATER/PF 6 MG/30 ML
0.2 PATIENT CONTROLLED ANALGESIA SYRINGE INTRAVENOUS EVERY 5 MIN PRN
Status: DISCONTINUED | OUTPATIENT
Start: 2023-07-13 | End: 2023-07-13 | Stop reason: HOSPADM

## 2023-07-13 RX ORDER — PROPOFOL 10 MG/ML
INJECTION, EMULSION INTRAVENOUS CONTINUOUS PRN
Status: DISCONTINUED | OUTPATIENT
Start: 2023-07-13 | End: 2023-07-13

## 2023-07-13 RX ORDER — LIDOCAINE HYDROCHLORIDE 20 MG/ML
INJECTION, SOLUTION INFILTRATION; PERINEURAL PRN
Status: DISCONTINUED | OUTPATIENT
Start: 2023-07-13 | End: 2023-07-13

## 2023-07-13 RX ORDER — ONDANSETRON 2 MG/ML
4 INJECTION INTRAMUSCULAR; INTRAVENOUS EVERY 30 MIN PRN
Status: DISCONTINUED | OUTPATIENT
Start: 2023-07-13 | End: 2023-07-13 | Stop reason: HOSPADM

## 2023-07-13 RX ADMIN — SODIUM CHLORIDE, POTASSIUM CHLORIDE, SODIUM LACTATE AND CALCIUM CHLORIDE: 600; 310; 30; 20 INJECTION, SOLUTION INTRAVENOUS at 09:12

## 2023-07-13 RX ADMIN — PROPOFOL 200 MG: 10 INJECTION, EMULSION INTRAVENOUS at 07:48

## 2023-07-13 RX ADMIN — PROPOFOL 200 MCG/KG/MIN: 10 INJECTION, EMULSION INTRAVENOUS at 07:50

## 2023-07-13 RX ADMIN — Medication 10 MG: at 08:44

## 2023-07-13 RX ADMIN — GLYCOPYRROLATE 0.2 MG: 0.2 INJECTION, SOLUTION INTRAMUSCULAR; INTRAVENOUS at 07:55

## 2023-07-13 RX ADMIN — SUGAMMADEX 200 MG: 100 INJECTION, SOLUTION INTRAVENOUS at 09:02

## 2023-07-13 RX ADMIN — FENTANYL CITRATE 50 MCG: 50 INJECTION, SOLUTION INTRAMUSCULAR; INTRAVENOUS at 08:13

## 2023-07-13 RX ADMIN — DEXAMETHASONE SODIUM PHOSPHATE 10 MG: 10 INJECTION, SOLUTION INTRAMUSCULAR; INTRAVENOUS at 07:56

## 2023-07-13 RX ADMIN — LIDOCAINE HYDROCHLORIDE 100 MG: 20 INJECTION, SOLUTION INFILTRATION; PERINEURAL at 07:48

## 2023-07-13 RX ADMIN — Medication 50 MG: at 07:50

## 2023-07-13 RX ADMIN — ONDANSETRON 4 MG: 2 INJECTION INTRAMUSCULAR; INTRAVENOUS at 07:59

## 2023-07-13 RX ADMIN — SODIUM CHLORIDE, POTASSIUM CHLORIDE, SODIUM LACTATE AND CALCIUM CHLORIDE: 600; 310; 30; 20 INJECTION, SOLUTION INTRAVENOUS at 07:40

## 2023-07-13 RX ADMIN — MIDAZOLAM 2 MG: 1 INJECTION INTRAMUSCULAR; INTRAVENOUS at 07:38

## 2023-07-13 RX ADMIN — FENTANYL CITRATE 50 MCG: 50 INJECTION, SOLUTION INTRAMUSCULAR; INTRAVENOUS at 07:48

## 2023-07-13 ASSESSMENT — ACTIVITIES OF DAILY LIVING (ADL)
ADLS_ACUITY_SCORE: 35

## 2023-07-13 NOTE — ANESTHESIA CARE TRANSFER NOTE
Patient: Leonid Deyanira    Procedure: Procedure(s):  Flexible bronchoscopy, direct laryngoscopy  ESOPHAGOSCOPY       Diagnosis: Malignant neoplasm of thyroid gland (H) [C73]  Diagnosis Additional Information: No value filed.    Anesthesia Type:   General     Note:    Oropharynx: oropharynx clear of all foreign objects and spontaneously breathing  Level of Consciousness: drowsy  Oxygen Supplementation: face mask  Level of Supplemental Oxygen (L/min / FiO2): 8  Independent Airway: airway patency satisfactory and stable  Dentition: dentition unchanged  Vital Signs Stable: post-procedure vital signs reviewed and stable  Report to RN Given: handoff report given  Patient transferred to: PACU    Handoff Report: Identifed the Patient, Identified the Reponsible Provider, Reviewed the pertinent medical history, Discussed the surgical course, Reviewed Intra-OP anesthesia mangement and issues during anesthesia, Set expectations for post-procedure period and Allowed opportunity for questions and acknowledgement of understanding      Vitals:  Vitals Value Taken Time   /76 07/13/23 0915   Temp     Pulse 87 07/13/23 0918   Resp     SpO2 96 % 07/13/23 0918   Vitals shown include unvalidated device data.    Electronically Signed By: MANSOOR Blackburn CRNA  July 13, 2023  9:20 AM

## 2023-07-13 NOTE — ANESTHESIA POSTPROCEDURE EVALUATION
Patient: Leonid Deyanira    Procedure: Procedure(s):  Flexible bronchoscopy, direct laryngoscopy  ESOPHAGOSCOPY       Anesthesia Type:  General    Note:  Disposition: Outpatient   Postop Pain Control: Uneventful            Sign Out: Well controlled pain   PONV: No   Neuro/Psych: Uneventful            Sign Out: Acceptable/Baseline neuro status   Airway/Respiratory: Uneventful            Sign Out: Acceptable/Baseline resp. status   CV/Hemodynamics: Uneventful            Sign Out: Acceptable CV status; No obvious hypovolemia; No obvious fluid overload   Other NRE:    DID A NON-ROUTINE EVENT OCCUR? No           Last vitals:  Vitals Value Taken Time   /71 07/13/23 0913   Temp     Pulse 91 07/13/23 0914   Resp     SpO2 97 % 07/13/23 0914   Vitals shown include unvalidated device data.    Electronically Signed By: Heaven Kumar MD  July 13, 2023  9:15 AM

## 2023-07-13 NOTE — ANESTHESIA PREPROCEDURE EVALUATION
Anesthesia Pre-Procedure Evaluation    Patient: Leonid Mcmillan   MRN: [id number] : 1985        Procedure : Procedure(s):  Flexible bronchoscopy  ESOPHAGOSCOPY          Past Medical History:   Diagnosis Date     Thyroid cancer (H)       Past Surgical History:   Procedure Laterality Date     SHOULDER SURGERY Right 2005    shoulder injury, did not have general anesthesia      No Known Allergies   Social History     Tobacco Use     Smoking status: Never     Smokeless tobacco: Never   Substance Use Topics     Alcohol use: Never      Wt Readings from Last 1 Encounters:   23 68.8 kg (151 lb 10.8 oz)        Anesthesia Evaluation   Pt has not had prior anesthetic         ROS/MED HX  ENT/Pulmonary:  - neg pulmonary ROS     Neurologic:  - neg neurologic ROS     Cardiovascular:  - neg cardiovascular ROS   (+) -----Previous cardiac testing   Echo: Date: Results:    Stress Test: Date: Results:    ECG Reviewed: Date: 3/3/2023 Results:  Sinus rhythm with sinus arrhythmia   Normal ECG  Cath: Date: Results:      METS/Exercise Tolerance: >4 METS Comment: Works out 3 times per week   Hematologic:  - neg hematologic  ROS     Musculoskeletal: Comment: History of right shoulder injury    (-) arthritis   GI/Hepatic: Comment: History of acid reflux, resolved after lifestyle changes ~6 month ago   (-) liver disease   Renal/Genitourinary:  - neg Renal ROS     Endo:     (+) thyroid problem,  Thyroid disease - Other,  (-) Type II DM   Psychiatric/Substance Use:  - neg psychiatric ROS     Infectious Disease: Comment: COVID 2023, no residual symptoms      Malignancy:   (+) Malignancy, History of Other.Other CA Thyroid cancer Active status post.    Other:  - neg other ROS          Physical Exam    Airway        Mallampati: II   TM distance: > 3 FB   Neck ROM: full   Mouth opening: > 3 cm    Respiratory Devices and Support         Dental           Cardiovascular          Rhythm and rate: regular and normal     Pulmonary           breath  sounds clear to auscultation           OUTSIDE LABS:  CBC:   Lab Results   Component Value Date    WBC 5.6 03/03/2023    HGB 14.8 03/03/2023    HCT 44.1 03/03/2023     03/03/2023     BMP:   Lab Results   Component Value Date     03/03/2023    POTASSIUM 4.2 03/03/2023    CHLORIDE 108 (H) 03/03/2023    CO2 25 03/03/2023    BUN 16 03/03/2023    CR 0.86 03/03/2023     03/03/2023     COAGS:   Lab Results   Component Value Date    PTT 29 03/03/2023    INR 1.06 03/03/2023     POC: No results found for: BGM, HCG, HCGS  HEPATIC:   Lab Results   Component Value Date    ALBUMIN 3.9 03/03/2023    PROTTOTAL 7.6 03/03/2023    ALT 26 03/03/2023    AST 20 03/03/2023    ALKPHOS 63 03/03/2023    BILITOTAL 0.3 03/03/2023     OTHER:   Lab Results   Component Value Date    SUMMER 8.7 03/03/2023    TSH 1.07 03/03/2023       Anesthesia Plan    ASA Status:  3      Anesthesia Type: General.     - Airway: ETT   Induction: Intravenous.   Maintenance: Inhalation.   Techniques and Equipment:     - Airway: Video-Laryngoscope, Fiberoptic Bronchoscope         Consents    Anesthesia Plan(s) and associated risks, benefits, and realistic alternatives discussed. Questions answered and patient/representative(s) expressed understanding.    - Discussed:     - Discussed with:  Patient      - Extended Intubation/Ventilatory Support Discussed: No.      - Patient is DNR/DNI Status: No    Use of blood products discussed: No .     Postoperative Care    Pain management: IV analgesics, Oral pain medications.   PONV prophylaxis: Ondansetron (or other 5HT-3), Dexamethasone or Solumedrol     Comments:                Heaven Kumar MD

## 2023-07-13 NOTE — ANESTHESIA PROCEDURE NOTES
Airway       Patient location during procedure: OR  Staff -        CRNA: Charleen Jean Baptiste APRN CRNA       Performed By: CRNA  Consent for Airway        Urgency: elective  Indications and Patient Condition       Indications for airway management: alfred-procedural       Induction type:intravenous       Mask difficulty assessment: 1 - vent by mask    Final Airway Details       Final airway type: supraglottic airway    Supraglottic Airway Details        Type: LMA       Brand: I-Gel       LMA size: 4    Post intubation assessment        Placement verified by: capnometry, equal breath sounds and chest rise        Number of attempts at approach: 1       Number of other approaches attempted: 0       Ease of procedure: easy       Dentition: Intact

## 2023-07-13 NOTE — OP NOTE
Procedure Date: 07/13/2023    SURGEON:  Antonio Morales MD    ASSISTANT:  Scarlett Potts MD    PREOPERATIVE DIAGNOSIS:  T4a papillary thyroid cancer.    POSTOPERATIVE DIAGNOSIS:  T4a papillary thyroid cancer.    PROCEDURE:   1.  Flexible bronchoscopy.  2.  Direct laryngoscopy and biopsy of right piriform sinus.  3.  Esophagoscopy.    ANESTHESIA:  General.    ESTIMATED BLOOD LOSS:  5 mL.    SPECIMENS:  Right piriform sinus lesion.    COMPLICATIONS:  None.    OPERATIVE INDICATIONS:  The patient is a pleasant 38-year-old male referred for evaluation of a thyroid mass.  Fine needle aspirate was consistent with papillary thyroid cancer.  Based on his imaging, I had concern for tracheal invasion.  I was able to see this on my flexible laryngoscopy in clinic.  He is here today for a diagnostic exam to assess the extent of tracheal invasion and assess for any pharyngeal or esophageal involvement.  After full discussion of the risks and benefits of the procedure, informed consent was obtained.    OPERATIVE FINDINGS:  He was an easy mask ventilation and ventilated well with a laryngeal mask airway.  He has submucosal disease on the right anterolateral aspect of the trachea, which appears inferior to the cricoid and measured approximately 3.5 cm from the vocal cords and the length of tracheal involvement is approximately 1.5 cm.  There is no obvious transmural disease in the esophagus.  He had some submucosal lesions in the right anterolateral piriform sinus.  Biopsies were obtained.    OPERATIVE COURSE:  The patient was brought to the operating room and placed on the operating table supine.  General anesthesia was induced with mask ventilation and subsequent laryngeal mask airway.  The patient was prepped and draped in the standard fashion.  A timeout was performed to identify the patient and correctness of the procedure.  Flexible bronchoscope was advanced through the laryngeal mask airway and topical lidocaine used for  anesthesia on the cords.  The entire length of the trachea was examined and photographs taken.  The patient was subsequently intubated by anesthesia and the bed turned 90 degrees.  Malleable tooth guards were used to protect the upper dentition and the Dedo laryngoscope was used to perform a full laryngoscopy.  Biopsies were taken of the right piriform sinus using a 4 mm cup forceps.  The flexible esophagoscope was then advanced into the esophagus and down to the stomach.  It was slowly withdrawn, and careful attention was placed on examination of the cervical esophagus.  No obvious disease was seen within the lumen.  This completed our procedure.  The patient was subsequently turned over to anesthesia, where he was awakened, extubated, and transferred to the recovery room in stable condition.    Antonio Morales MD        D: 2023   T: 2023   MT: william    Name:     LIZBETH ESTRADA  MRN:      [id number]        Account:        [id number]   :      1985           Procedure Date: 2023     Document: [id number]

## 2023-07-13 NOTE — DISCHARGE INSTRUCTIONS
[facility]  Same-Day Surgery   Adult Discharge Orders & Instructions     For 24 hours after surgery    Get plenty of rest.  A responsible adult must stay with you for at least 24 hours after you leave the hospital.   Do not drive or use heavy equipment.  If you have weakness or tingling, don't drive or use heavy equipment until this feeling goes away.  Do not drink alcohol.  Avoid strenuous or risky activities.  Ask for help when climbing stairs.   You may feel lightheaded.  IF so, sit for a few minutes before standing.  Have someone help you get up.   If you have nausea (feel sick to your stomach): Drink only clear liquids such as apple juice, ginger ale, broth or 7-Up.  Rest may also help.  Be sure to drink enough fluids.  Move to a regular diet as you feel able.  You may have a slight fever. Call the doctor if your fever is over 100 F (37.7 C) (taken under the tongue) or lasts longer than 24 hours.  You may have a dry mouth, a sore throat, muscle aches or trouble sleeping.  These should go away after 24 hours.  Do not make important or legal decisions.   Call your doctor for any of the followin.  Signs of infection (fever, growing tenderness at the surgery site, a large amount of drainage or bleeding, severe pain, foul-smelling drainage, redness, swelling).    2. It has been over 8 to 10 hours since surgery and you are still not able to urinate (pass water).    3.  Headache for over 24 hours.    4.  Numbness, tingling or weakness the day after surgery (if you had spinal anesthesia).  To contact a doctor, call Dr Morales's clinic at [phone] open Monday through Friday 8 am to 5 pm or:    '   [phone] and ask for the ENT resident on call for (answered 24 hours a day)  '   Emergency Department:    [facility]: [phone]       (TTY for hearing impaired: [phone])

## 2023-07-13 NOTE — BRIEF OP NOTE
[facility]    Brief Operative Note    Pre-operative diagnosis: Malignant neoplasm of thyroid gland (H) [C73]  Post-operative diagnosis Same as pre-operative diagnosis    Procedure: Procedure(s):  Flexible bronchoscopy, direct laryngoscopy  ESOPHAGOSCOPY  Surgeon: Surgeon(s) and Role:     * Antonio Morales MD - Primary     * Scarlett Potts MD - Resident - Assisting  Anesthesia: General   Estimated Blood Loss: Less than 10 ml    Drains: None  Specimens:   ID Type Source Tests Collected by Time Destination   1 : Right Pyriform Sinus Biopsy Other SURGICAL PATHOLOGY EXAM Antonio Morales MD 7/13/2023  8:38 AM      Findings:   Affected tracheal segment is 3.5cm below the vocal cords and 1.5cm in length. No suspicious lesions of the esophagus. There is a small submucosal irregularity in the right pyriform sinus, biopsy was obtained..  Complications: None.  Implants: * No implants in log *

## 2023-07-17 LAB
PATH REPORT.COMMENTS IMP SPEC: NORMAL
PATH REPORT.COMMENTS IMP SPEC: NORMAL
PATH REPORT.FINAL DX SPEC: NORMAL
PATH REPORT.GROSS SPEC: NORMAL
PATH REPORT.MICROSCOPIC SPEC OTHER STN: NORMAL
PATH REPORT.RELEVANT HX SPEC: NORMAL
PHOTO IMAGE: NORMAL

## 2023-07-19 ENCOUNTER — PREP FOR PROCEDURE (OUTPATIENT)
Dept: OTOLARYNGOLOGY | Facility: CLINIC | Age: 38
End: 2023-07-19
Payer: COMMERCIAL

## 2023-07-19 DIAGNOSIS — C73 MALIGNANT NEOPLASM OF THYROID GLAND (H): Primary | ICD-10-CM

## 2023-07-24 ENCOUNTER — OFFICE VISIT (OUTPATIENT)
Dept: OTOLARYNGOLOGY | Facility: CLINIC | Age: 38
End: 2023-07-24
Payer: COMMERCIAL

## 2023-07-24 VITALS
HEART RATE: 69 BPM | SYSTOLIC BLOOD PRESSURE: 132 MMHG | BODY MASS INDEX: 25.78 KG/M2 | DIASTOLIC BLOOD PRESSURE: 90 MMHG | OXYGEN SATURATION: 98 % | HEIGHT: 64 IN | WEIGHT: 151 LBS

## 2023-07-24 DIAGNOSIS — C73 MALIGNANT NEOPLASM OF THYROID GLAND (H): Primary | ICD-10-CM

## 2023-07-24 PROCEDURE — 99215 OFFICE O/P EST HI 40 MIN: CPT | Performed by: STUDENT IN AN ORGANIZED HEALTH CARE EDUCATION/TRAINING PROGRAM

## 2023-07-24 ASSESSMENT — PAIN SCALES - GENERAL: PAINLEVEL: NO PAIN (0)

## 2023-07-24 NOTE — LETTER
7/24/2023       RE: Leonid Mcmillan  [address] [place] [address]     Dear Colleague,    Thank you for referring your patient, Leonid Mcmillan, to the [facility] [place] at [facility]. Please see a copy of my visit note below.    Mr. Mcmillan returns today for followup with his wife and other family.  He is a 38-year-old male with an advanced papillary thyroid cancer with evidence of tracheal invasion.    I took him to the operating room for bronchoscopy and esophagoscopy.  His esophagoscopy was clear.  The bronchoscopy shows 1.5 cm segment of involved 3.5 cm inferior to the vocal cords.  There were some submucosal changes and biopsies were negative.  He is here today to have an in depth discussion about a surgical plan.  The surgical plan is a total thyroidectomy, tracheal resection, and possible central neck dissection pending intraoperative findings.  I have again prepared him for the likelihood of sacrifice of the right recurrent laryngeal nerve.  He also understands the potential need for a tracheostomy tube or t-tube.  He understands the anticipated postoperative course.  We reviewed the risks of the operation that include but are not limited to infection, bleeding, return trips to the operating room, tracheal anastomosis.  He understands the need for radioactive iodine and possibly radiation postoperatively. His questions were answered today.  He is ready for surgery.          45 minutes spent on the date of the encounter in chart review, patient visit, review of tests, documentation and/or discussion with other providers about the issues documented above.       Again, thank you for allowing me to participate in the care of your patient.      Sincerely,    Antonio Morales MD

## 2023-07-24 NOTE — NURSING NOTE
"No chief complaint on file.      Blood pressure (!) 132/90, pulse 69, height 1.626 m (5' 4\"), weight 68.5 kg (151 lb), SpO2 98 %.    Cecilia Milligan, EMT      "
no cold intolerance/no heat intolerance

## 2023-07-25 ENCOUNTER — PREP FOR PROCEDURE (OUTPATIENT)
Dept: OTOLARYNGOLOGY | Facility: CLINIC | Age: 38
End: 2023-07-25
Payer: COMMERCIAL

## 2023-07-25 ENCOUNTER — APPOINTMENT (OUTPATIENT)
Dept: INTERPRETER SERVICES | Facility: CLINIC | Age: 38
End: 2023-07-25
Payer: COMMERCIAL

## 2023-07-25 ENCOUNTER — TELEPHONE (OUTPATIENT)
Dept: OTOLARYNGOLOGY | Facility: CLINIC | Age: 38
End: 2023-07-25
Payer: COMMERCIAL

## 2023-07-25 NOTE — TELEPHONE ENCOUNTER
Patient is scheduled for surgery with Dr. Morales.     Spoke with: Patients spouse, Helen.     Date of Surgery: 8/24/23    Location: [facility] OR     Pre op with Provider: SWATHI     H&P: Offered to schedule patient with PAC as spouse said he does not have a current PCP/PCP clinic. Spouse said she would prefer to try and schedule pre-op closer to home in [place]. Did ask that they give our clinic a call back should they have trouble scheduling pre-op and we will schedule patient with PAC.     Additional imaging/appointments: Will schedule patients 1-2 week post-op appointment.     Surgery packet: Per spouse's request, will mail surgery packet to address on file.      Additional comments: SWATHI Mai on 7/25/2023 at 11:25 AM

## 2023-07-25 NOTE — TELEPHONE ENCOUNTER
Called patient with  to schedule surgery with Dr. Morales. No answer, callback number [phone] was left on  for patient.    Concha Mai on 7/25/2023 at 10:51 AM

## 2023-07-27 NOTE — PROGRESS NOTES
Mr. Mcmillan returns today for followup with his wife and other family.  He is a 38-year-old male with an advanced papillary thyroid cancer with evidence of tracheal invasion.    I took him to the operating room for bronchoscopy and esophagoscopy.  His esophagoscopy was clear.  The bronchoscopy shows 1.5 cm segment of involved 3.5 cm inferior to the vocal cords.  There were some submucosal changes and biopsies were negative.  He is here today to have an in depth discussion about a surgical plan.  The surgical plan is a total thyroidectomy, tracheal resection, and possible central neck dissection pending intraoperative findings.  I have again prepared him for the likelihood of sacrifice of the right recurrent laryngeal nerve.  He also understands the potential need for a tracheostomy tube or t-tube.  He understands the anticipated postoperative course.  We reviewed the risks of the operation that include but are not limited to infection, bleeding, return trips to the operating room, tracheal anastomosis.  He understands the need for radioactive iodine and possibly radiation postoperatively. His questions were answered today.  He is ready for surgery.          45 minutes spent on the date of the encounter in chart review, patient visit, review of tests, documentation and/or discussion with other providers about the issues documented above.

## 2023-08-11 ENCOUNTER — MYC MEDICAL ADVICE (OUTPATIENT)
Dept: FAMILY MEDICINE | Facility: CLINIC | Age: 38
End: 2023-08-11

## 2023-08-11 ENCOUNTER — OFFICE VISIT (OUTPATIENT)
Dept: FAMILY MEDICINE | Facility: CLINIC | Age: 38
End: 2023-08-11
Payer: COMMERCIAL

## 2023-08-11 VITALS
SYSTOLIC BLOOD PRESSURE: 138 MMHG | WEIGHT: 150.1 LBS | HEART RATE: 63 BPM | OXYGEN SATURATION: 99 % | TEMPERATURE: 98.2 F | HEIGHT: 64 IN | BODY MASS INDEX: 25.62 KG/M2 | RESPIRATION RATE: 18 BRPM | DIASTOLIC BLOOD PRESSURE: 80 MMHG

## 2023-08-11 DIAGNOSIS — Z11.4 SCREENING FOR HIV (HUMAN IMMUNODEFICIENCY VIRUS): ICD-10-CM

## 2023-08-11 DIAGNOSIS — E78.2 MIXED HYPERLIPIDEMIA: ICD-10-CM

## 2023-08-11 DIAGNOSIS — Z13.220 SCREENING FOR HYPERLIPIDEMIA: ICD-10-CM

## 2023-08-11 DIAGNOSIS — Z11.59 NEED FOR HEPATITIS C SCREENING TEST: ICD-10-CM

## 2023-08-11 DIAGNOSIS — Z01.818 PREOP GENERAL PHYSICAL EXAM: Primary | ICD-10-CM

## 2023-08-11 DIAGNOSIS — C73 THYROID CARCINOMA (H): ICD-10-CM

## 2023-08-11 LAB
ANION GAP SERPL CALCULATED.3IONS-SCNC: 10 MMOL/L (ref 7–15)
BUN SERPL-MCNC: 16.5 MG/DL (ref 6–20)
CALCIUM SERPL-MCNC: 9.6 MG/DL (ref 8.6–10)
CHLORIDE SERPL-SCNC: 103 MMOL/L (ref 98–107)
CHOLEST SERPL-MCNC: 201 MG/DL
CREAT SERPL-MCNC: 0.85 MG/DL (ref 0.67–1.17)
DEPRECATED HCO3 PLAS-SCNC: 26 MMOL/L (ref 22–29)
ERYTHROCYTE [DISTWIDTH] IN BLOOD BY AUTOMATED COUNT: 11.6 % (ref 10–15)
GFR SERPL CREATININE-BSD FRML MDRD: >90 ML/MIN/1.73M2
GLUCOSE SERPL-MCNC: 88 MG/DL (ref 70–99)
HCT VFR BLD AUTO: 47.3 % (ref 40–53)
HDLC SERPL-MCNC: 41 MG/DL
HGB BLD-MCNC: 15.9 G/DL (ref 13.3–17.7)
HIV 1+2 AB+HIV1 P24 AG SERPL QL IA: NONREACTIVE
LDLC SERPL CALC-MCNC: 118 MG/DL
MCH RBC QN AUTO: 29.4 PG (ref 26.5–33)
MCHC RBC AUTO-ENTMCNC: 33.6 G/DL (ref 31.5–36.5)
MCV RBC AUTO: 87 FL (ref 78–100)
NONHDLC SERPL-MCNC: 160 MG/DL
PLATELET # BLD AUTO: 217 10E3/UL (ref 150–450)
POTASSIUM SERPL-SCNC: 4.3 MMOL/L (ref 3.4–5.3)
RBC # BLD AUTO: 5.41 10E6/UL (ref 4.4–5.9)
SODIUM SERPL-SCNC: 139 MMOL/L (ref 136–145)
TRIGL SERPL-MCNC: 212 MG/DL
WBC # BLD AUTO: 5 10E3/UL (ref 4–11)

## 2023-08-11 PROCEDURE — 86803 HEPATITIS C AB TEST: CPT | Performed by: NURSE PRACTITIONER

## 2023-08-11 PROCEDURE — 85027 COMPLETE CBC AUTOMATED: CPT | Performed by: NURSE PRACTITIONER

## 2023-08-11 PROCEDURE — 87389 HIV-1 AG W/HIV-1&-2 AB AG IA: CPT | Performed by: NURSE PRACTITIONER

## 2023-08-11 PROCEDURE — 36415 COLL VENOUS BLD VENIPUNCTURE: CPT | Performed by: NURSE PRACTITIONER

## 2023-08-11 PROCEDURE — 80061 LIPID PANEL: CPT | Performed by: NURSE PRACTITIONER

## 2023-08-11 PROCEDURE — 99204 OFFICE O/P NEW MOD 45 MIN: CPT | Performed by: NURSE PRACTITIONER

## 2023-08-11 PROCEDURE — 80048 BASIC METABOLIC PNL TOTAL CA: CPT | Performed by: NURSE PRACTITIONER

## 2023-08-11 NOTE — PATIENT INSTRUCTIONS
For informational purposes only. Not to replace the advice of your health care provider. Copyright   2003,  [facility] [employer]. All rights reserved. Clinically reviewed by Cydney Minaya MD. [employer] [id number] - REV .  Preparing for Your Surgery  Getting started  A nurse will call you to review your health history and instructions. They will give you an arrival time based on your scheduled surgery time. Please be ready to share:  Your doctor's clinic name and phone number  Your medical, surgical, and anesthesia history  A list of allergies and sensitivities  A list of medicines, including herbal treatments and over-the-counter drugs  Whether the patient has a legal guardian (ask how to send us the papers in advance)  Please tell us if you're pregnant--or if there's any chance you might be pregnant. Some surgeries may injure a fetus (unborn baby), so they require a pregnancy test. Surgeries that are safe for a fetus don't always need a test, and you can choose whether to have one.   If you have a child who's having surgery, please ask for a copy of Preparing for Your Child's Surgery.    Preparing for surgery  Within 10 to 30 days of surgery: Have a pre-op exam (sometimes called an H&P, or History and Physical). This can be done at a clinic or pre-operative center.  If you're having a , you may not need this exam. Talk to your care team.  At your pre-op exam, talk to your care team about all medicines you take. If you need to stop any medicines before surgery, ask when to start taking them again.  We do this for your safety. Many medicines can make you bleed too much during surgery. Some change how well surgery (anesthesia) drugs work.  Call your insurance company to let them know you're having surgery. (If you don't have insurance, call [phone].)  Call your clinic if there's any change in your health. This includes signs of a cold or flu (sore throat, runny nose, cough, rash, fever). It also  includes a scrape or scratch near the surgery site.  If you have questions on the day of surgery, call your hospital or surgery center.  Eating and drinking guidelines  For your safety: Unless your surgeon tells you otherwise, follow the guidelines below.  Eat and drink as usual until 8 hours before you arrive for surgery. After that, no food or milk.  Drink clear liquids until 2 hours before you arrive. These are liquids you can see through, like water, Gatorade, and Propel Water. They also include plain black coffee and tea (no cream or milk), candy, and breath mints. You can spit out gum when you arrive.  If you drink alcohol: Stop drinking it the night before surgery.  If your care team tells you to take medicine on the morning of surgery, it's okay to take it with a sip of water.  Preventing infection  Shower or bathe the night before and morning of your surgery. Follow the instructions your clinic gave you. (If no instructions, use regular soap.)  Don't shave or clip hair near your surgery site. We'll remove the hair if needed.  Don't smoke or vape the morning of surgery. You may chew nicotine gum up to 2 hours before surgery. A nicotine patch is okay.  Note: Some surgeries require you to completely quit smoking and nicotine. Check with your surgeon.  Your care team will make every effort to keep you safe from infection. We will:  Clean our hands often with soap and water (or an alcohol-based hand rub).  Clean the skin at your surgery site with a special soap that kills germs.  Give you a special gown to keep you warm. (Cold raises the risk of infection.)  Wear special hair covers, masks, gowns and gloves during surgery.  Give antibiotic medicine, if prescribed. Not all surgeries need antibiotics.  What to bring on the day of surgery  Photo ID and insurance card  Copy of your health care directive, if you have one  Glasses and hearing aids (bring cases)  You can't wear contacts during surgery  Inhaler and eye  drops, if you use them (tell us about these when you arrive)  CPAP machine or breathing device, if you use them  A few personal items, if spending the night  If you have . . .  A pacemaker, ICD (cardiac defibrillator) or other implant: Bring the ID card.  An implanted stimulator: Bring the remote control.  A legal guardian: Bring a copy of the certified (court-stamped) guardianship papers.  Please remove any jewelry, including body piercings. Leave jewelry and other valuables at home.  If you're going home the day of surgery  You must have a responsible adult drive you home. They should stay with you overnight as well.  If you don't have someone to stay with you, and you aren't safe to go home alone, we may keep you overnight. Insurance often won't pay for this.  After surgery  If it's hard to control your pain or you need more pain medicine, please call your surgeon's office.  Questions?   If you have any questions for your care team, list them here: _________________________________________________________________________________________________________________________________________________________________________ ____________________________________ ____________________________________ _______________________________

## 2023-08-11 NOTE — TELEPHONE ENCOUNTER
General Call    Contacts         Type Contact Phone/Fax    08/11/2023 02:36 PM CDT Phone (Incoming) Helen Mcmillan (Emergency Contact) [phone]          Reason for Call:  Patient's wife called to say she received a call from [employer] after meeting with provider that patient's claim was closed.  Please refer to patient's My Chart message below.    [employer] called wife and is asking provider to submit  an additional note/letter as to why the delay in submitting the forms, thus closing the claim.  Please call patient's wifeHelen, with any questions.      What are your questions or concerns:  See Above    Date of last appointment with provider: today    Could we [facility] this [facility] or would you prefer to receive a phone call?:   Patient would prefer a phone call   Okay to leave a detailed message?: Yes at cell phone number on file [phone] (home)

## 2023-08-11 NOTE — PROGRESS NOTES
[facility]  [address] [address]  Phone: [phone]  Fax: [phone]  Primary Provider: No Ref-Primary, Physician  Pre-op Performing Provider:    JASON MORALES  PHONE,       PREOPERATIVE EVALUATION:  Today's date: 8/11/2023    Leonid Mcmillan is a 38 year old male who presents for a preoperative evaluation.      8/11/2023     8:19 AM   Additional Questions   Roomed by LC-LPN   Accompanied by WIFE         8/11/2023     8:19 AM   Patient Reported Additional Medications   Patient reports taking the following new medications NA       Surgical Information:  Surgery/Procedure: THYROID SURGERY  Surgery Location:  OR [facility]  Surgeon: DR. OPAL TRUJILLO  Surgery Date: 8/24/2023  Time of Surgery: unknown  Where patient plans to recover: Other: HOSPITAL  Fax number for surgical facility: [phone]    Assessment & Plan     The proposed surgical procedure is considered INTERMEDIATE risk.    Preop general physical exam  Patient is cleared for scheduled operation  - CBC with platelets  - Basic metabolic panel  (Ca, Cl, CO2, Creat, Gluc, K, Na, BUN)  - CBC with platelets  - Basic metabolic panel  (Ca, Cl, CO2, Creat, Gluc, K, Na, BUN)    Thyroid carcinoma (H)  Agree with recommendation for thyroidectomy.  Discussed with patient that they should write down questions for surgeon and endocrine oncologist as well as asked to record sessions that they may have regarding medical conversations as they have for healthcare literacy and recording conversations may help them remember instructions.    Screening for hyperlipidemia  Routine screening done  Nonfasting  - Lipid panel reflex to direct LDL Non-fasting  - Lipid panel reflex to direct LDL Non-fasting    Screening for HIV (human immunodeficiency virus)  Routine screening done    Need for hepatitis C screening test  Routine screening done              - No identified additional risk factors other  than previously addressed    Antiplatelet or Anticoagulation Medication Instructions:   - Patient is on no antiplatelet or anticoagulation medications.    Additional Medication Instructions:  Patient is on no additional chronic medications    RECOMMENDATION:  APPROVAL GIVEN to proceed with proposed procedure, without further diagnostic evaluation.    MANSOOR Urbina CNP on 8/11/2023 at 3:50 PM          Subjective   Needing to establish care  HPI related to upcoming procedure:     Had COVID Feb this year and a week after started having hemoptysis and went to ED and had a subsequent finding of Thyroid carcinoma of 3/2023,   Imaging completed during that visit revealed a thyroid nodule with mild tracheal deviation. He has since been evaluated by ENT and further evaluation including an ultrasound guided biopsy of the nodule was completed. This was positive for malignancy. He was seen in follow-up with Dr. Morales on 6/26/23 and is now scheduled for the procedure as above   Patient does recall feeling tired however his most recent TSH was normal.    He is otherwise breathing well, swallowing well, and has no other acute physical symptoms or concerns.        8/11/2023     8:08 AM   Preop Questions   1. Have you ever had a heart attack or stroke? No   2. Have you ever had surgery on your heart or blood vessels, such as a stent placement, a coronary artery bypass, or surgery on an artery in your head, neck, heart, or legs? No   3. Do you have chest pain with activity? No   4. Do you have a history of  heart failure? No   5. Do you currently have a cold, bronchitis or symptoms of other infection? No   6. Do you have a cough, shortness of breath, or wheezing? No   7. Do you or anyone in your family have previous history of blood clots? No   8. Do you or does anyone in your family have a serious bleeding problem such as prolonged bleeding following surgeries or cuts? No   9. Have you ever had problems with anemia or been  told to take iron pills? No   10. Have you had any abnormal blood loss such as black, tarry or bloody stools? No   11. Have you ever had a blood transfusion? No   12. Are you willing to have a blood transfusion if it is medically needed before, during, or after your surgery? Yes   13. Have you or any of your relatives ever had problems with anesthesia? No   14. Do you have sleep apnea, excessive snoring or daytime drowsiness? No   15. Do you have any artifical heart valves or other implanted medical devices like a pacemaker, defibrillator, or continuous glucose monitor? No   16. Do you have artificial joints? No   17. Are you allergic to latex? No       Health Care Directive:  Patient does not have a Health Care Directive or Living Will: Discussed advance care planning with patient; information given to patient to review.    Preoperative Review of :   reviewed - no record of controlled substances prescribed.        Review of Systems  CONSTITUTIONAL: NEGATIVE for fever, chills, change in weight, does have fatigue  INTEGUMENTARY/SKIN: NEGATIVE for worrisome rashes, moles or lesions  EYES: NEGATIVE for vision changes or irritation  ENT/MOUTH: NEGATIVE for ear, mouth and throat problems  RESP: NEGATIVE for significant cough or SOB  CV: NEGATIVE for chest pain, palpitations or peripheral edema  GI: NEGATIVE for nausea, abdominal pain, heartburn, or change in bowel habits  : NEGATIVE for frequency, dysuria, or hematuria  MUSCULOSKELETAL: NEGATIVE for significant arthralgias or myalgia  NEURO: NEGATIVE for weakness, dizziness or paresthesias  ENDOCRINE: NEGATIVE for temperature intolerance, skin/hair changes  HEME: NEGATIVE for bleeding problems  PSYCHIATRIC: NEGATIVE for changes in mood or affect    Patient Active Problem List    Diagnosis Date Noted    Thyroid cancer (H)      Priority: Medium      Past Medical History:   Diagnosis Date    Thyroid cancer (H)      Past Surgical History:   Procedure Laterality Date  "   BRONCHOSCOPY FLEXIBLE AND RIGID N/A 7/13/2023    Procedure: Flexible bronchoscopy, direct laryngoscopy;  Surgeon: Antonio Morales MD;  Location: [facility] OR    ESOPHAGOSCOPY N/A 7/13/2023    Procedure: ESOPHAGOSCOPY;  Surgeon: Antonio Morales MD;  Location: [facility] OR    SHOULDER SURGERY Right 2005    shoulder injury, did not have general anesthesia     No current outpatient medications on file.       No Known Allergies     Social History     Tobacco Use    Smoking status: Never    Smokeless tobacco: Never   Substance Use Topics    Alcohol use: Never     Family History   Problem Relation Age of Onset    Anesthesia Reaction No family hx of     Venous thrombosis No family hx of     Myocardial Infarction No family hx of     Cerebrovascular Disease No family hx of      History   Drug Use Unknown         Objective     /80 (BP Location: Right arm, Patient Position: Sitting, Cuff Size: Adult Regular)   Pulse 63   Temp 98.2  F (36.8  C) (Oral)   Resp 18   Ht 1.626 m (5' 4\")   Wt 68.1 kg (150 lb 1.6 oz)   SpO2 99%   BMI 25.76 kg/m      Physical Exam    GENERAL APPEARANCE: healthy, alert and no distress     EYES: EOMI,  PERRL     HENT: ear canals and TM's normal and nose and mouth without ulcers or lesions     NECK: no adenopathy, no asymmetry, masses, or scars and thyroid normal to palpation     RESP: lungs clear to auscultation - no rales, rhonchi or wheezes     CV: regular rates and rhythm, normal S1 S2, no S3 or S4 and no murmur, click or rub     ABDOMEN:  soft, nontender, no HSM or masses and bowel sounds normal     MS: extremities normal- no gross deformities noted, no evidence of inflammation in joints, FROM in all extremities.     SKIN: no suspicious lesions or rashes     NEURO: Normal strength and tone, sensory exam grossly normal, mentation intact and speech normal     PSYCH: mentation appears normal. and affect normal/bright     LYMPHATICS: No cervical adenopathy    Recent Labs   Lab Test " 03/03/23  1809   HGB 14.8      INR 1.06      POTASSIUM 4.2   CR 0.86      Results for orders placed or performed in visit on 08/11/23   HIV Antigen Antibody Combo     Status: Normal   Result Value Ref Range    HIV Antigen Antibody Combo Nonreactive Nonreactive   Lipid panel reflex to direct LDL Non-fasting     Status: Abnormal   Result Value Ref Range    Cholesterol 201 (H) <200 mg/dL    Triglycerides 212 (H) <150 mg/dL    Direct Measure HDL 41 >=40 mg/dL    LDL Cholesterol Calculated 118 (H) <=100 mg/dL    Non HDL Cholesterol 160 (H) <130 mg/dL    Narrative    Cholesterol  Desirable:  <200 mg/dL    Triglycerides  Normal:  Less than 150 mg/dL  Borderline High:  150-199 mg/dL  High:  200-499 mg/dL  Very High:  Greater than or equal to 500 mg/dL    Direct Measure HDL  Female:  Greater than or equal to 50 mg/dL   Male:  Greater than or equal to 40 mg/dL    LDL Cholesterol  Desirable:  <100mg/dL  Above Desirable:  100-129 mg/dL   Borderline High:  130-159 mg/dL   High:  160-189 mg/dL   Very High:  >= 190 mg/dL    Non HDL Cholesterol  Desirable:  130 mg/dL  Above Desirable:  130-159 mg/dL  Borderline High:  160-189 mg/dL  High:  190-219 mg/dL  Very High:  Greater than or equal to 220 mg/dL   CBC with platelets     Status: Normal   Result Value Ref Range    WBC Count 5.0 4.0 - 11.0 10e3/uL    RBC Count 5.41 4.40 - 5.90 10e6/uL    Hemoglobin 15.9 13.3 - 17.7 g/dL    Hematocrit 47.3 40.0 - 53.0 %    MCV 87 78 - 100 fL    MCH 29.4 26.5 - 33.0 pg    MCHC 33.6 31.5 - 36.5 g/dL    RDW 11.6 10.0 - 15.0 %    Platelet Count 217 150 - 450 10e3/uL   Basic metabolic panel  (Ca, Cl, CO2, Creat, Gluc, K, Na, BUN)     Status: Normal   Result Value Ref Range    Sodium 139 136 - 145 mmol/L    Potassium 4.3 3.4 - 5.3 mmol/L    Chloride 103 98 - 107 mmol/L    Carbon Dioxide (CO2) 26 22 - 29 mmol/L    Anion Gap 10 7 - 15 mmol/L    Urea Nitrogen 16.5 6.0 - 20.0 mg/dL    Creatinine 0.85 0.67 - 1.17 mg/dL    Calcium 9.6 8.6 - 10.0  mg/dL    Glucose 88 70 - 99 mg/dL    GFR Estimate >90 >60 mL/min/1.73m2       Diagnostics:  Labs pending at this time.  Results will be reviewed when available.   No EKG required, no history of coronary heart disease, significant arrhythmia, peripheral arterial disease or other structural heart disease.    Revised Cardiac Risk Index (RCRI):  The patient has the following serious cardiovascular risks for perioperative complications:   - No serious cardiac risks = 0 points     RCRI Interpretation: 0 points: Class I (very low risk - 0.4% complication rate)         Signed Electronically by: MANSOOR Urbina CNP  Copy of this evaluation report is provided to requesting physician.

## 2023-08-11 NOTE — LETTER
August 15, 2023      Leonid Mcmillan  [address] [place] [address]        To Whom It May Concern:    Leonid Mcmillan was seen on 8/11/23, by me, MANSOOR Urbina, ERVINP-C, his general practitioner.  The patient was unable to get in sooner due to availability of primary care office.  Please excuse him from work for 3 months starting one August 24, 2023.  This is the date of his initial surgery and will continue to pursue radioactive iodine and radiation treatment for his papillary thyroid cancer.  Please see attached disability forms.  Initially these forms were given to the surgeon Dr. Morales who was unable to complete these forms by 8/11/2023 due to availability.  It is requested by the patient to reopen his [employer] claim for continuous disability for 3 months.      Sincerely,        MANSOOR Urbina CNP

## 2023-08-14 LAB — HCV AB SERPL QL IA: NONREACTIVE

## 2023-08-18 NOTE — TELEPHONE ENCOUNTER
Letter has been completed as well as disability forms to [facility].  We will fax these to your [facility] location at 1 [phone].  A copy of this fax will also be placed into your patient chart.     Please let me know if you have any questions or concerns.     Thank you for trusting us with your care. It was a pleasure seeing you.  MANSOOR Urbina CNP on 8/18/2023 at 1:40 PM      Forms and signed letter given to MA to fax and then place in HI bin for scanning into the patient chart

## 2023-08-18 NOTE — RESULT ENCOUNTER NOTE
You are cleared for your surgical procedure - normal labs!    The 10-year ASCVD risk score (Tyra JOSEPH, et al., 2019) is: 1.7%    Values used to calculate the score:      Age: 38 years      Sex: Male      Is Non- : No      Diabetic: No      Tobacco smoker: No      Systolic Blood Pressure: 138 mmHg      Is BP treated: No      HDL Cholesterol: 41 mg/dL      Total Cholesterol: 201 mg/dL    Your cholesterol testing is mildly elevated though you are still at very low risk for a cardiovascular event. This is good news and means we do not need to consider a statin medication at this time. Cholesterol levels can be maintained with lifestyle modifications to lower cardiovascular disease risk. This includes eating a heart-healthy plant forward diet with more emphasis on vegetables, fruits & whole grains, regular aerobic exercises (30min-1hr daily), maintenance of desirable body weight and avoidance of tobacco products.    Your basic metabolic panel shows your kidney function and electrolytes (sodium and potassium) were normal.    Your CBC (complete blood count) which looks at your hemoglobin and other blood cell types looks good- no concerns for anemia or infection.  Your Hepatitis C and HIV screening were both negative (normal results).

## 2023-08-22 ENCOUNTER — APPOINTMENT (OUTPATIENT)
Dept: INTERPRETER SERVICES | Facility: CLINIC | Age: 38
End: 2023-08-22
Payer: COMMERCIAL

## 2023-08-22 NOTE — TELEPHONE ENCOUNTER
Patients daughter, Helen, called stating they have yet to receive arrival time for patients surgery with Dr. Morales on 8/24/23. Patients daughter was transferred to pre op department at [phone].    Concha Mai on 8/22/2023 at 11:01 AM

## 2023-08-23 ENCOUNTER — ANESTHESIA EVENT (OUTPATIENT)
Dept: SURGERY | Facility: CLINIC | Age: 38
End: 2023-08-23
Payer: COMMERCIAL

## 2023-08-23 NOTE — ANESTHESIA PREPROCEDURE EVALUATION
Anesthesia Pre-Procedure Evaluation    Patient: Leonid Mcmillan   MRN: [id number] : 1985        Procedure : Procedure(s):  Flexible bronchoscopy  ESOPHAGOSCOPY          Past Medical History:   Diagnosis Date    Thyroid cancer (H)       Past Surgical History:   Procedure Laterality Date    BRONCHOSCOPY FLEXIBLE AND RIGID N/A 2023    Procedure: Flexible bronchoscopy, direct laryngoscopy;  Surgeon: Antonio Morales MD;  Location: [facility] OR    ESOPHAGOSCOPY N/A 2023    Procedure: ESOPHAGOSCOPY;  Surgeon: Antonio Morales MD;  Location: [facility] OR    SHOULDER SURGERY Right 2005    shoulder injury, did not have general anesthesia      No Known Allergies   Social History     Tobacco Use    Smoking status: Never    Smokeless tobacco: Never   Substance Use Topics    Alcohol use: Never      Wt Readings from Last 1 Encounters:   23 68.1 kg (150 lb 1.6 oz)        Anesthesia Evaluation   Pt has had prior anesthetic. Type: General.    No history of anesthetic complications       ROS/MED HX  ENT/Pulmonary: Comment: Papilary thyroid cancer with invasion into trachea   (-) asthma   Neurologic:    (-) no seizures and no CVA   Cardiovascular:     (+) Dyslipidemia - -   -  - -                                 Previous cardiac testing   Echo: Date: Results:    Stress Test:  Date: Results:    ECG Reviewed:  Date: 3/3/2023 Results:  Sinus rhythm with sinus arrhythmia    Normal ECG  Cath:  Date: Results:      METS/Exercise Tolerance: >4 METS Comment: Works out 3 times per week   Hematologic:  - neg hematologic  ROS     Musculoskeletal: Comment: History of right shoulder injury    (-) arthritis   GI/Hepatic: Comment: History of acid reflux, resolved after lifestyle changes ~6 month ago   (-) GERD and liver disease   Renal/Genitourinary:  - neg Renal ROS     Endo:     (+)          thyroid problem, Thyroid disease - Other,        (-) Type II DM   Psychiatric/Substance Use:  - neg psychiatric ROS     Infectious Disease:  Comment: COVID 2/2023, no residual symptoms      Malignancy:   (+) Malignancy, History of Other.Other CA Thyroid cancer Active status post.    Other:  - neg other ROS          Physical Exam    Airway      Comment: Immobile trachea    Mallampati: II   TM distance: > 3 FB   Neck ROM: full   Mouth opening: > 3 cm    Respiratory Devices and Support         Dental         B=Bridge, C=Chipped, L=Loose, M=Missing    Cardiovascular          Rhythm and rate: regular and normal     Pulmonary           breath sounds clear to auscultation           OUTSIDE LABS:  CBC:   Lab Results   Component Value Date    WBC 5.0 08/11/2023    WBC 5.6 03/03/2023    HGB 15.9 08/11/2023    HGB 14.8 03/03/2023    HCT 47.3 08/11/2023    HCT 44.1 03/03/2023     08/11/2023     03/03/2023     BMP:   Lab Results   Component Value Date     08/11/2023     03/03/2023    POTASSIUM 4.3 08/11/2023    POTASSIUM 4.2 03/03/2023    CHLORIDE 103 08/11/2023    CHLORIDE 108 (H) 03/03/2023    CO2 26 08/11/2023    CO2 25 03/03/2023    BUN 16.5 08/11/2023    BUN 16 03/03/2023    CR 0.85 08/11/2023    CR 0.86 03/03/2023    GLC 88 08/11/2023     03/03/2023     COAGS:   Lab Results   Component Value Date    PTT 29 03/03/2023    INR 1.06 03/03/2023     POC: No results found for: BGM, HCG, HCGS  HEPATIC:   Lab Results   Component Value Date    ALBUMIN 3.9 03/03/2023    PROTTOTAL 7.6 03/03/2023    ALT 26 03/03/2023    AST 20 03/03/2023    ALKPHOS 63 03/03/2023    BILITOTAL 0.3 03/03/2023     OTHER:   Lab Results   Component Value Date    SUMMER 9.6 08/11/2023    TSH 1.07 03/03/2023       Anesthesia Plan    ASA Status:  3    NPO Status:  NPO Appropriate    Anesthesia Type: General.     - Airway: ETT   Induction: Intravenous.   Maintenance: Balanced.   Techniques and Equipment:     - Airway: Video-Laryngoscope, Fiberoptic Bronchoscope (Mulitple ETT sizes available; 4.0-7.0)     - Lines/Monitors: 2nd IV, BIS     Consents    Anesthesia Plan(s)  and associated risks, benefits, and realistic alternatives discussed. Questions answered and patient/representative(s) expressed understanding.     - Discussed:     - Discussed with:  Patient,       - Extended Intubation/Ventilatory Support Discussed: Yes.      - Patient is DNR/DNI Status: No     Use of blood products discussed: Yes.     - Discussed with: Patient.     - Consented: consented to blood products            Reason for refusal: other.     Postoperative Care    Pain management: IV analgesics, Oral pain medications, Multi-modal analgesia.   PONV prophylaxis: Ondansetron (or other 5HT-3), Dexamethasone or Solumedrol     Comments:                OSKAR JOHNSON MD

## 2023-08-24 ENCOUNTER — ANESTHESIA (OUTPATIENT)
Dept: SURGERY | Facility: CLINIC | Age: 38
End: 2023-08-24
Payer: COMMERCIAL

## 2023-08-24 ENCOUNTER — HOSPITAL ENCOUNTER (INPATIENT)
Facility: CLINIC | Age: 38
LOS: 4 days | Discharge: HOME | End: 2023-08-28
Attending: STUDENT IN AN ORGANIZED HEALTH CARE EDUCATION/TRAINING PROGRAM | Admitting: STUDENT IN AN ORGANIZED HEALTH CARE EDUCATION/TRAINING PROGRAM
Payer: COMMERCIAL

## 2023-08-24 DIAGNOSIS — C73 THYROID CANCER (H): Primary | ICD-10-CM

## 2023-08-24 PROBLEM — Z48.89 ENCOUNTER FOR POSTOPERATIVE CARE: Status: ACTIVE | Noted: 2023-08-24

## 2023-08-24 LAB
ALBUMIN SERPL BCG-MCNC: 4 G/DL (ref 3.5–5.2)
CALCIUM SERPL-MCNC: 8.5 MG/DL (ref 8.6–10)
GLUCOSE BLDC GLUCOMTR-MCNC: 99 MG/DL (ref 70–99)
PTH-INTACT SERPL-MCNC: 31 PG/ML (ref 15–65)

## 2023-08-24 PROCEDURE — 82040 ASSAY OF SERUM ALBUMIN: CPT | Performed by: STUDENT IN AN ORGANIZED HEALTH CARE EDUCATION/TRAINING PROGRAM

## 2023-08-24 PROCEDURE — 0GTK0ZZ RESECTION OF THYROID GLAND, OPEN APPROACH: ICD-10-PCS | Performed by: THORACIC SURGERY (CARDIOTHORACIC VASCULAR SURGERY)

## 2023-08-24 PROCEDURE — 88331 PATH CONSLTJ SURG 1 BLK 1SPC: CPT | Mod: TC | Performed by: STUDENT IN AN ORGANIZED HEALTH CARE EDUCATION/TRAINING PROGRAM

## 2023-08-24 PROCEDURE — 88342 IMHCHEM/IMCYTCHM 1ST ANTB: CPT | Mod: 26 | Performed by: PATHOLOGY

## 2023-08-24 PROCEDURE — 88341 IMHCHEM/IMCYTCHM EA ADD ANTB: CPT | Mod: 26 | Performed by: PATHOLOGY

## 2023-08-24 PROCEDURE — 120N000002 HC R&B MED SURG/OB UMMC

## 2023-08-24 PROCEDURE — 258N000003 HC RX IP 258 OP 636: Performed by: ANESTHESIOLOGY

## 2023-08-24 PROCEDURE — 82310 ASSAY OF CALCIUM: CPT | Performed by: STUDENT IN AN ORGANIZED HEALTH CARE EDUCATION/TRAINING PROGRAM

## 2023-08-24 PROCEDURE — 370N000017 HC ANESTHESIA TECHNICAL FEE, PER MIN: Performed by: STUDENT IN AN ORGANIZED HEALTH CARE EDUCATION/TRAINING PROGRAM

## 2023-08-24 PROCEDURE — 272N000001 HC OR GENERAL SUPPLY STERILE: Performed by: STUDENT IN AN ORGANIZED HEALTH CARE EDUCATION/TRAINING PROGRAM

## 2023-08-24 PROCEDURE — 36415 COLL VENOUS BLD VENIPUNCTURE: CPT | Performed by: STUDENT IN AN ORGANIZED HEALTH CARE EDUCATION/TRAINING PROGRAM

## 2023-08-24 PROCEDURE — 710N000010 HC RECOVERY PHASE 1, LEVEL 2, PER MIN: Performed by: STUDENT IN AN ORGANIZED HEALTH CARE EDUCATION/TRAINING PROGRAM

## 2023-08-24 PROCEDURE — 88307 TISSUE EXAM BY PATHOLOGIST: CPT | Mod: 26 | Performed by: PATHOLOGY

## 2023-08-24 PROCEDURE — 250N000011 HC RX IP 250 OP 636: Performed by: ANESTHESIOLOGY

## 2023-08-24 PROCEDURE — 0CJS8ZZ INSPECTION OF LARYNX, VIA NATURAL OR ARTIFICIAL OPENING ENDOSCOPIC: ICD-10-PCS | Performed by: THORACIC SURGERY (CARDIOTHORACIC VASCULAR SURGERY)

## 2023-08-24 PROCEDURE — 250N000009 HC RX 250

## 2023-08-24 PROCEDURE — 258N000003 HC RX IP 258 OP 636

## 2023-08-24 PROCEDURE — 0GSN0ZZ REPOSITION RIGHT INFERIOR PARATHYROID GLAND, OPEN APPROACH: ICD-10-PCS | Performed by: THORACIC SURGERY (CARDIOTHORACIC VASCULAR SURGERY)

## 2023-08-24 PROCEDURE — 250N000011 HC RX IP 250 OP 636: Performed by: STUDENT IN AN ORGANIZED HEALTH CARE EDUCATION/TRAINING PROGRAM

## 2023-08-24 PROCEDURE — 250N000011 HC RX IP 250 OP 636

## 2023-08-24 PROCEDURE — 250N000011 HC RX IP 250 OP 636: Mod: JZ | Performed by: STUDENT IN AN ORGANIZED HEALTH CARE EDUCATION/TRAINING PROGRAM

## 2023-08-24 PROCEDURE — 250N000025 HC SEVOFLURANE, PER MIN: Performed by: STUDENT IN AN ORGANIZED HEALTH CARE EDUCATION/TRAINING PROGRAM

## 2023-08-24 PROCEDURE — 88305 TISSUE EXAM BY PATHOLOGIST: CPT | Mod: TC | Performed by: STUDENT IN AN ORGANIZED HEALTH CARE EDUCATION/TRAINING PROGRAM

## 2023-08-24 PROCEDURE — 250N000013 HC RX MED GY IP 250 OP 250 PS 637: Performed by: ANESTHESIOLOGY

## 2023-08-24 PROCEDURE — 83970 ASSAY OF PARATHORMONE: CPT | Performed by: STUDENT IN AN ORGANIZED HEALTH CARE EDUCATION/TRAINING PROGRAM

## 2023-08-24 PROCEDURE — 0DJ08ZZ INSPECTION OF UPPER INTESTINAL TRACT, VIA NATURAL OR ARTIFICIAL OPENING ENDOSCOPIC: ICD-10-PCS | Performed by: THORACIC SURGERY (CARDIOTHORACIC VASCULAR SURGERY)

## 2023-08-24 PROCEDURE — 999N000141 HC STATISTIC PRE-PROCEDURE NURSING ASSESSMENT: Performed by: STUDENT IN AN ORGANIZED HEALTH CARE EDUCATION/TRAINING PROGRAM

## 2023-08-24 PROCEDURE — 0BB18ZZ EXCISION OF TRACHEA, VIA NATURAL OR ARTIFICIAL OPENING ENDOSCOPIC: ICD-10-PCS | Performed by: THORACIC SURGERY (CARDIOTHORACIC VASCULAR SURGERY)

## 2023-08-24 PROCEDURE — 250N000009 HC RX 250: Performed by: STUDENT IN AN ORGANIZED HEALTH CARE EDUCATION/TRAINING PROGRAM

## 2023-08-24 PROCEDURE — 360N000079 HC SURGERY LEVEL 6, PER MIN: Performed by: STUDENT IN AN ORGANIZED HEALTH CARE EDUCATION/TRAINING PROGRAM

## 2023-08-24 PROCEDURE — 88331 PATH CONSLTJ SURG 1 BLK 1SPC: CPT | Mod: 26 | Performed by: PATHOLOGY

## 2023-08-24 PROCEDURE — 88305 TISSUE EXAM BY PATHOLOGIST: CPT | Mod: 26 | Performed by: PATHOLOGY

## 2023-08-24 PROCEDURE — 0BJ08ZZ INSPECTION OF TRACHEOBRONCHIAL TREE, VIA NATURAL OR ARTIFICIAL OPENING ENDOSCOPIC: ICD-10-PCS | Performed by: THORACIC SURGERY (CARDIOTHORACIC VASCULAR SURGERY)

## 2023-08-24 RX ORDER — POLYETHYLENE GLYCOL 3350 17 G/17G
17 POWDER, FOR SOLUTION ORAL DAILY
Status: DISCONTINUED | OUTPATIENT
Start: 2023-08-25 | End: 2023-08-28 | Stop reason: HOSPADM

## 2023-08-24 RX ORDER — ONDANSETRON 4 MG/1
4 TABLET, ORALLY DISINTEGRATING ORAL EVERY 30 MIN PRN
Status: DISCONTINUED | OUTPATIENT
Start: 2023-08-24 | End: 2023-08-24 | Stop reason: HOSPADM

## 2023-08-24 RX ORDER — ACETAMINOPHEN 325 MG/1
975 TABLET ORAL EVERY 8 HOURS
Status: DISCONTINUED | OUTPATIENT
Start: 2023-08-24 | End: 2023-08-24

## 2023-08-24 RX ORDER — OXYCODONE HYDROCHLORIDE 10 MG/1
10 TABLET ORAL EVERY 4 HOURS PRN
Status: DISCONTINUED | OUTPATIENT
Start: 2023-08-24 | End: 2023-08-24

## 2023-08-24 RX ORDER — LEVOTHYROXINE SODIUM 112 UG/1
112 TABLET ORAL
Status: DISCONTINUED | OUTPATIENT
Start: 2023-08-25 | End: 2023-08-25

## 2023-08-24 RX ORDER — NALOXONE HYDROCHLORIDE 0.4 MG/ML
0.2 INJECTION, SOLUTION INTRAMUSCULAR; INTRAVENOUS; SUBCUTANEOUS
Status: DISCONTINUED | OUTPATIENT
Start: 2023-08-24 | End: 2023-08-28 | Stop reason: HOSPADM

## 2023-08-24 RX ORDER — EPHEDRINE SULFATE 50 MG/ML
INJECTION, SOLUTION INTRAMUSCULAR; INTRAVENOUS; SUBCUTANEOUS PRN
Status: DISCONTINUED | OUTPATIENT
Start: 2023-08-24 | End: 2023-08-24

## 2023-08-24 RX ORDER — LABETALOL HYDROCHLORIDE 5 MG/ML
10 INJECTION, SOLUTION INTRAVENOUS
Status: DISCONTINUED | OUTPATIENT
Start: 2023-08-24 | End: 2023-08-24 | Stop reason: HOSPADM

## 2023-08-24 RX ORDER — AMPICILLIN AND SULBACTAM 1; .5 G/1; G/1
1.5 INJECTION, POWDER, FOR SOLUTION INTRAMUSCULAR; INTRAVENOUS SEE ADMIN INSTRUCTIONS
Status: DISCONTINUED | OUTPATIENT
Start: 2023-08-24 | End: 2023-08-24 | Stop reason: HOSPADM

## 2023-08-24 RX ORDER — FENTANYL CITRATE 50 UG/ML
50 INJECTION, SOLUTION INTRAMUSCULAR; INTRAVENOUS EVERY 5 MIN PRN
Status: DISCONTINUED | OUTPATIENT
Start: 2023-08-24 | End: 2023-08-24 | Stop reason: HOSPADM

## 2023-08-24 RX ORDER — AMOXICILLIN 250 MG
1 CAPSULE ORAL 2 TIMES DAILY
Status: DISCONTINUED | OUTPATIENT
Start: 2023-08-24 | End: 2023-08-24

## 2023-08-24 RX ORDER — LIDOCAINE HYDROCHLORIDE 20 MG/ML
INJECTION, SOLUTION INFILTRATION; PERINEURAL PRN
Status: DISCONTINUED | OUTPATIENT
Start: 2023-08-24 | End: 2023-08-24

## 2023-08-24 RX ORDER — ACETAMINOPHEN 325 MG/10.15ML
975 LIQUID ORAL EVERY 8 HOURS
Status: COMPLETED | OUTPATIENT
Start: 2023-08-24 | End: 2023-08-27

## 2023-08-24 RX ORDER — SODIUM CHLORIDE, SODIUM LACTATE, POTASSIUM CHLORIDE, CALCIUM CHLORIDE 600; 310; 30; 20 MG/100ML; MG/100ML; MG/100ML; MG/100ML
INJECTION, SOLUTION INTRAVENOUS CONTINUOUS PRN
Status: DISCONTINUED | OUTPATIENT
Start: 2023-08-24 | End: 2023-08-24

## 2023-08-24 RX ORDER — HYDRALAZINE HYDROCHLORIDE 20 MG/ML
2.5-5 INJECTION INTRAMUSCULAR; INTRAVENOUS EVERY 10 MIN PRN
Status: DISCONTINUED | OUTPATIENT
Start: 2023-08-24 | End: 2023-08-24 | Stop reason: HOSPADM

## 2023-08-24 RX ORDER — DEXAMETHASONE SODIUM PHOSPHATE 4 MG/ML
10 INJECTION, SOLUTION INTRA-ARTICULAR; INTRALESIONAL; INTRAMUSCULAR; INTRAVENOUS; SOFT TISSUE ONCE
Status: COMPLETED | OUTPATIENT
Start: 2023-08-24 | End: 2023-08-24

## 2023-08-24 RX ORDER — SODIUM CHLORIDE, SODIUM LACTATE, POTASSIUM CHLORIDE, CALCIUM CHLORIDE 600; 310; 30; 20 MG/100ML; MG/100ML; MG/100ML; MG/100ML
INJECTION, SOLUTION INTRAVENOUS CONTINUOUS
Status: DISCONTINUED | OUTPATIENT
Start: 2023-08-24 | End: 2023-08-24 | Stop reason: HOSPADM

## 2023-08-24 RX ORDER — PANTOPRAZOLE SODIUM 40 MG/1
40 TABLET, DELAYED RELEASE ORAL
Status: DISCONTINUED | OUTPATIENT
Start: 2023-08-25 | End: 2023-08-25

## 2023-08-24 RX ORDER — MINERAL OIL/HYDROPHIL PETROLAT
OINTMENT (GRAM) TOPICAL EVERY 8 HOURS
Status: DISCONTINUED | OUTPATIENT
Start: 2023-08-25 | End: 2023-08-28 | Stop reason: HOSPADM

## 2023-08-24 RX ORDER — ONDANSETRON 2 MG/ML
INJECTION INTRAMUSCULAR; INTRAVENOUS PRN
Status: DISCONTINUED | OUTPATIENT
Start: 2023-08-24 | End: 2023-08-24

## 2023-08-24 RX ORDER — ONDANSETRON 2 MG/ML
4 INJECTION INTRAMUSCULAR; INTRAVENOUS EVERY 30 MIN PRN
Status: DISCONTINUED | OUTPATIENT
Start: 2023-08-24 | End: 2023-08-24 | Stop reason: HOSPADM

## 2023-08-24 RX ORDER — OXYCODONE HCL 5 MG/5 ML
5 SOLUTION, ORAL ORAL EVERY 4 HOURS PRN
Status: DISCONTINUED | OUTPATIENT
Start: 2023-08-24 | End: 2023-08-28 | Stop reason: HOSPADM

## 2023-08-24 RX ORDER — GLYCOPYRROLATE 0.2 MG/ML
INJECTION, SOLUTION INTRAMUSCULAR; INTRAVENOUS PRN
Status: DISCONTINUED | OUTPATIENT
Start: 2023-08-24 | End: 2023-08-24

## 2023-08-24 RX ORDER — BISACODYL 10 MG
10 SUPPOSITORY, RECTAL RECTAL DAILY PRN
Status: DISCONTINUED | OUTPATIENT
Start: 2023-08-24 | End: 2023-08-28 | Stop reason: HOSPADM

## 2023-08-24 RX ORDER — GINSENG 100 MG
CAPSULE ORAL EVERY 8 HOURS
Status: COMPLETED | OUTPATIENT
Start: 2023-08-24 | End: 2023-08-25

## 2023-08-24 RX ORDER — ONDANSETRON 2 MG/ML
4 INJECTION INTRAMUSCULAR; INTRAVENOUS EVERY 6 HOURS PRN
Status: DISCONTINUED | OUTPATIENT
Start: 2023-08-24 | End: 2023-08-28 | Stop reason: HOSPADM

## 2023-08-24 RX ORDER — ACETAMINOPHEN 325 MG/1
650 TABLET ORAL EVERY 4 HOURS PRN
Status: DISCONTINUED | OUTPATIENT
Start: 2023-08-27 | End: 2023-08-24

## 2023-08-24 RX ORDER — PROCHLORPERAZINE MALEATE 10 MG
10 TABLET ORAL EVERY 6 HOURS PRN
Status: DISCONTINUED | OUTPATIENT
Start: 2023-08-24 | End: 2023-08-28 | Stop reason: HOSPADM

## 2023-08-24 RX ORDER — AMOXICILLIN 250 MG
1 CAPSULE ORAL 2 TIMES DAILY
Status: DISCONTINUED | OUTPATIENT
Start: 2023-08-25 | End: 2023-08-28 | Stop reason: HOSPADM

## 2023-08-24 RX ORDER — HYDROMORPHONE HCL IN WATER/PF 6 MG/30 ML
0.2 PATIENT CONTROLLED ANALGESIA SYRINGE INTRAVENOUS
Status: DISCONTINUED | OUTPATIENT
Start: 2023-08-24 | End: 2023-08-28 | Stop reason: HOSPADM

## 2023-08-24 RX ORDER — NALOXONE HYDROCHLORIDE 0.4 MG/ML
0.4 INJECTION, SOLUTION INTRAMUSCULAR; INTRAVENOUS; SUBCUTANEOUS
Status: DISCONTINUED | OUTPATIENT
Start: 2023-08-24 | End: 2023-08-28 | Stop reason: HOSPADM

## 2023-08-24 RX ORDER — OXYCODONE HCL 5 MG/5 ML
10 SOLUTION, ORAL ORAL EVERY 4 HOURS PRN
Status: DISCONTINUED | OUTPATIENT
Start: 2023-08-24 | End: 2023-08-28 | Stop reason: HOSPADM

## 2023-08-24 RX ORDER — OXYCODONE HYDROCHLORIDE 5 MG/1
5 TABLET ORAL EVERY 4 HOURS PRN
Status: DISCONTINUED | OUTPATIENT
Start: 2023-08-24 | End: 2023-08-24

## 2023-08-24 RX ORDER — PHENYLEPHRINE HCL IN 0.9% NACL 50MG/250ML
.5-1.25 PLASTIC BAG, INJECTION (ML) INTRAVENOUS CONTINUOUS
Status: DISCONTINUED | OUTPATIENT
Start: 2023-08-24 | End: 2023-08-24 | Stop reason: HOSPADM

## 2023-08-24 RX ORDER — FENTANYL CITRATE 50 UG/ML
25 INJECTION, SOLUTION INTRAMUSCULAR; INTRAVENOUS EVERY 5 MIN PRN
Status: DISCONTINUED | OUTPATIENT
Start: 2023-08-24 | End: 2023-08-24 | Stop reason: HOSPADM

## 2023-08-24 RX ORDER — LIDOCAINE HYDROCHLORIDE AND EPINEPHRINE 10; 10 MG/ML; UG/ML
INJECTION, SOLUTION INFILTRATION; PERINEURAL PRN
Status: DISCONTINUED | OUTPATIENT
Start: 2023-08-24 | End: 2023-08-24 | Stop reason: HOSPADM

## 2023-08-24 RX ORDER — HYDROMORPHONE HCL IN WATER/PF 6 MG/30 ML
0.2 PATIENT CONTROLLED ANALGESIA SYRINGE INTRAVENOUS EVERY 5 MIN PRN
Status: DISCONTINUED | OUTPATIENT
Start: 2023-08-24 | End: 2023-08-24 | Stop reason: HOSPADM

## 2023-08-24 RX ORDER — PROPOFOL 10 MG/ML
INJECTION, EMULSION INTRAVENOUS PRN
Status: DISCONTINUED | OUTPATIENT
Start: 2023-08-24 | End: 2023-08-24

## 2023-08-24 RX ORDER — ENOXAPARIN SODIUM 100 MG/ML
40 INJECTION SUBCUTANEOUS EVERY 24 HOURS
Status: DISCONTINUED | OUTPATIENT
Start: 2023-08-25 | End: 2023-08-28 | Stop reason: HOSPADM

## 2023-08-24 RX ORDER — HALOPERIDOL 5 MG/ML
1 INJECTION INTRAMUSCULAR
Status: DISCONTINUED | OUTPATIENT
Start: 2023-08-24 | End: 2023-08-24 | Stop reason: HOSPADM

## 2023-08-24 RX ORDER — LIDOCAINE 40 MG/G
CREAM TOPICAL
Status: DISCONTINUED | OUTPATIENT
Start: 2023-08-24 | End: 2023-08-24 | Stop reason: HOSPADM

## 2023-08-24 RX ORDER — HYDROMORPHONE HCL IN WATER/PF 6 MG/30 ML
0.4 PATIENT CONTROLLED ANALGESIA SYRINGE INTRAVENOUS EVERY 5 MIN PRN
Status: DISCONTINUED | OUTPATIENT
Start: 2023-08-24 | End: 2023-08-24 | Stop reason: HOSPADM

## 2023-08-24 RX ORDER — ACETAMINOPHEN 325 MG/1
975 TABLET ORAL ONCE
Status: COMPLETED | OUTPATIENT
Start: 2023-08-24 | End: 2023-08-24

## 2023-08-24 RX ORDER — ACETAMINOPHEN 325 MG/10.15ML
650 LIQUID ORAL EVERY 4 HOURS PRN
Status: DISCONTINUED | OUTPATIENT
Start: 2023-08-24 | End: 2023-08-28 | Stop reason: HOSPADM

## 2023-08-24 RX ORDER — DEXAMETHASONE SODIUM PHOSPHATE 4 MG/ML
6 INJECTION, SOLUTION INTRA-ARTICULAR; INTRALESIONAL; INTRAMUSCULAR; INTRAVENOUS; SOFT TISSUE EVERY 8 HOURS
Status: COMPLETED | OUTPATIENT
Start: 2023-08-24 | End: 2023-08-25

## 2023-08-24 RX ORDER — HYDROMORPHONE HCL IN WATER/PF 6 MG/30 ML
0.4 PATIENT CONTROLLED ANALGESIA SYRINGE INTRAVENOUS
Status: DISCONTINUED | OUTPATIENT
Start: 2023-08-24 | End: 2023-08-28 | Stop reason: HOSPADM

## 2023-08-24 RX ORDER — LIDOCAINE 40 MG/G
CREAM TOPICAL
Status: DISCONTINUED | OUTPATIENT
Start: 2023-08-24 | End: 2023-08-28 | Stop reason: HOSPADM

## 2023-08-24 RX ORDER — FENTANYL CITRATE 50 UG/ML
INJECTION, SOLUTION INTRAMUSCULAR; INTRAVENOUS PRN
Status: DISCONTINUED | OUTPATIENT
Start: 2023-08-24 | End: 2023-08-24

## 2023-08-24 RX ORDER — AMPICILLIN AND SULBACTAM 2; 1 G/1; G/1
3 INJECTION, POWDER, FOR SOLUTION INTRAMUSCULAR; INTRAVENOUS EVERY 6 HOURS
Status: COMPLETED | OUTPATIENT
Start: 2023-08-24 | End: 2023-08-25

## 2023-08-24 RX ORDER — AMPICILLIN AND SULBACTAM 2; 1 G/1; G/1
3 INJECTION, POWDER, FOR SOLUTION INTRAMUSCULAR; INTRAVENOUS
Status: COMPLETED | OUTPATIENT
Start: 2023-08-24 | End: 2023-08-24

## 2023-08-24 RX ORDER — ONDANSETRON 4 MG/1
4 TABLET, ORALLY DISINTEGRATING ORAL EVERY 6 HOURS PRN
Status: DISCONTINUED | OUTPATIENT
Start: 2023-08-24 | End: 2023-08-28 | Stop reason: HOSPADM

## 2023-08-24 RX ADMIN — PHENYLEPHRINE HYDROCHLORIDE 100 MCG: 10 INJECTION INTRAVENOUS at 08:56

## 2023-08-24 RX ADMIN — PHENYLEPHRINE HYDROCHLORIDE 100 MCG: 10 INJECTION INTRAVENOUS at 12:46

## 2023-08-24 RX ADMIN — DEXAMETHASONE SODIUM PHOSPHATE 10 MG: 4 INJECTION, SOLUTION INTRAMUSCULAR; INTRAVENOUS at 08:18

## 2023-08-24 RX ADMIN — SUGAMMADEX 200 MG: 100 INJECTION, SOLUTION INTRAVENOUS at 14:11

## 2023-08-24 RX ADMIN — ACETAMINOPHEN 975 MG: 325 TABLET, FILM COATED ORAL at 06:27

## 2023-08-24 RX ADMIN — FENTANYL CITRATE 50 MCG: 50 INJECTION, SOLUTION INTRAMUSCULAR; INTRAVENOUS at 08:44

## 2023-08-24 RX ADMIN — FENTANYL CITRATE 100 MCG: 50 INJECTION, SOLUTION INTRAMUSCULAR; INTRAVENOUS at 08:01

## 2023-08-24 RX ADMIN — PROPOFOL 50 MG: 10 INJECTION, EMULSION INTRAVENOUS at 12:30

## 2023-08-24 RX ADMIN — PHENYLEPHRINE HYDROCHLORIDE 100 MCG: 10 INJECTION INTRAVENOUS at 09:01

## 2023-08-24 RX ADMIN — HYDROMORPHONE HYDROCHLORIDE 0.2 MG: 0.2 INJECTION, SOLUTION INTRAMUSCULAR; INTRAVENOUS; SUBCUTANEOUS at 23:27

## 2023-08-24 RX ADMIN — PHENYLEPHRINE HYDROCHLORIDE 100 MCG: 10 INJECTION INTRAVENOUS at 09:03

## 2023-08-24 RX ADMIN — SODIUM CHLORIDE, POTASSIUM CHLORIDE, SODIUM LACTATE AND CALCIUM CHLORIDE: 600; 310; 30; 20 INJECTION, SOLUTION INTRAVENOUS at 14:15

## 2023-08-24 RX ADMIN — ONDANSETRON 4 MG: 2 INJECTION INTRAMUSCULAR; INTRAVENOUS at 13:03

## 2023-08-24 RX ADMIN — PHENYLEPHRINE HYDROCHLORIDE 100 MCG: 10 INJECTION INTRAVENOUS at 14:05

## 2023-08-24 RX ADMIN — GLYCOPYRROLATE 0.2 MG: 0.2 INJECTION, SOLUTION INTRAMUSCULAR; INTRAVENOUS at 08:41

## 2023-08-24 RX ADMIN — Medication 0.3 MCG/KG/MIN: at 09:35

## 2023-08-24 RX ADMIN — PHENYLEPHRINE HYDROCHLORIDE 100 MCG: 10 INJECTION INTRAVENOUS at 12:56

## 2023-08-24 RX ADMIN — AMPICILLIN SODIUM AND SULBACTAM SODIUM 1.5 G: 2; 1 INJECTION, POWDER, FOR SOLUTION INTRAMUSCULAR; INTRAVENOUS at 09:40

## 2023-08-24 RX ADMIN — PHENYLEPHRINE HYDROCHLORIDE 100 MCG: 10 INJECTION INTRAVENOUS at 12:01

## 2023-08-24 RX ADMIN — ONDANSETRON 4 MG: 2 INJECTION INTRAMUSCULAR; INTRAVENOUS at 23:31

## 2023-08-24 RX ADMIN — HYDROMORPHONE HYDROCHLORIDE 0.5 MG: 1 INJECTION, SOLUTION INTRAMUSCULAR; INTRAVENOUS; SUBCUTANEOUS at 13:06

## 2023-08-24 RX ADMIN — PHENYLEPHRINE HYDROCHLORIDE 100 MCG: 10 INJECTION INTRAVENOUS at 09:19

## 2023-08-24 RX ADMIN — AMPICILLIN SODIUM AND SULBACTAM SODIUM 3 G: 2; 1 INJECTION, POWDER, FOR SOLUTION INTRAMUSCULAR; INTRAVENOUS at 07:41

## 2023-08-24 RX ADMIN — DEXTROSE MONOHYDRATE AND SODIUM CHLORIDE: 5; .45 INJECTION, SOLUTION INTRAVENOUS at 18:31

## 2023-08-24 RX ADMIN — FENTANYL CITRATE 25 MCG: 50 INJECTION, SOLUTION INTRAMUSCULAR; INTRAVENOUS at 15:00

## 2023-08-24 RX ADMIN — AMPICILLIN SODIUM AND SULBACTAM SODIUM 1.5 G: 2; 1 INJECTION, POWDER, FOR SOLUTION INTRAMUSCULAR; INTRAVENOUS at 11:43

## 2023-08-24 RX ADMIN — PHENYLEPHRINE HYDROCHLORIDE 100 MCG: 10 INJECTION INTRAVENOUS at 09:26

## 2023-08-24 RX ADMIN — AMPICILLIN SODIUM AND SULBACTAM SODIUM 1.5 G: 2; 1 INJECTION, POWDER, FOR SOLUTION INTRAMUSCULAR; INTRAVENOUS at 13:43

## 2023-08-24 RX ADMIN — Medication 50 MG: at 07:49

## 2023-08-24 RX ADMIN — PHENYLEPHRINE HYDROCHLORIDE 100 MCG: 10 INJECTION INTRAVENOUS at 11:53

## 2023-08-24 RX ADMIN — SODIUM CHLORIDE, POTASSIUM CHLORIDE, SODIUM LACTATE AND CALCIUM CHLORIDE: 600; 310; 30; 20 INJECTION, SOLUTION INTRAVENOUS at 09:19

## 2023-08-24 RX ADMIN — EPHEDRINE SULFATE 5 MG: 5 INJECTION INTRAVENOUS at 13:08

## 2023-08-24 RX ADMIN — EPHEDRINE SULFATE 5 MG: 5 INJECTION INTRAVENOUS at 13:06

## 2023-08-24 RX ADMIN — FENTANYL CITRATE 25 MCG: 50 INJECTION, SOLUTION INTRAMUSCULAR; INTRAVENOUS at 14:53

## 2023-08-24 RX ADMIN — HYDROMORPHONE HYDROCHLORIDE 0.2 MG: 0.2 INJECTION, SOLUTION INTRAMUSCULAR; INTRAVENOUS; SUBCUTANEOUS at 19:58

## 2023-08-24 RX ADMIN — PROPOFOL 50 MG: 10 INJECTION, EMULSION INTRAVENOUS at 08:44

## 2023-08-24 RX ADMIN — FENTANYL CITRATE 50 MCG: 50 INJECTION, SOLUTION INTRAMUSCULAR; INTRAVENOUS at 11:26

## 2023-08-24 RX ADMIN — FENTANYL CITRATE 50 MCG: 50 INJECTION, SOLUTION INTRAMUSCULAR; INTRAVENOUS at 12:29

## 2023-08-24 RX ADMIN — LIDOCAINE HYDROCHLORIDE 100 MG: 20 INJECTION, SOLUTION INFILTRATION; PERINEURAL at 07:40

## 2023-08-24 RX ADMIN — PROPOFOL 200 MG: 10 INJECTION, EMULSION INTRAVENOUS at 07:40

## 2023-08-24 RX ADMIN — SODIUM CHLORIDE, POTASSIUM CHLORIDE, SODIUM LACTATE AND CALCIUM CHLORIDE: 600; 310; 30; 20 INJECTION, SOLUTION INTRAVENOUS at 09:30

## 2023-08-24 RX ADMIN — DEXAMETHASONE SODIUM PHOSPHATE 6 MG: 4 INJECTION, SOLUTION INTRA-ARTICULAR; INTRALESIONAL; INTRAMUSCULAR; INTRAVENOUS; SOFT TISSUE at 18:34

## 2023-08-24 RX ADMIN — MIDAZOLAM 2 MG: 1 INJECTION INTRAMUSCULAR; INTRAVENOUS at 07:27

## 2023-08-24 RX ADMIN — SODIUM CHLORIDE, POTASSIUM CHLORIDE, SODIUM LACTATE AND CALCIUM CHLORIDE: 600; 310; 30; 20 INJECTION, SOLUTION INTRAVENOUS at 07:35

## 2023-08-24 RX ADMIN — AMPICILLIN SODIUM AND SULBACTAM SODIUM 3 G: 2; 1 INJECTION, POWDER, FOR SOLUTION INTRAMUSCULAR; INTRAVENOUS at 19:47

## 2023-08-24 ASSESSMENT — ENCOUNTER SYMPTOMS: SEIZURES: 0

## 2023-08-24 ASSESSMENT — ACTIVITIES OF DAILY LIVING (ADL)
ADLS_ACUITY_SCORE: 22
ADLS_ACUITY_SCORE: 30
ADLS_ACUITY_SCORE: 22

## 2023-08-24 NOTE — ANESTHESIA CARE TRANSFER NOTE
Patient: Leonid Deyanira    Procedure: Procedure(s):  total thyroidectomy  direct laryngoscopy, flexible Bronchoscopy  Esophagoscopy  tracheal resection       Diagnosis: Malignant neoplasm of thyroid gland (H) [C73]  Diagnosis Additional Information: No value filed.    Anesthesia Type:   General     Note:    Oropharynx: oropharynx clear of all foreign objects and spontaneously breathing  Level of Consciousness: drowsy  Oxygen Supplementation: face mask    Independent Airway: airway patency satisfactory and stable  Dentition: dentition unchanged  Vital Signs Stable: post-procedure vital signs reviewed and stable  Report to RN Given: handoff report given  Patient transferred to: PACU    Handoff Report: Identifed the Patient, Identified the Reponsible Provider, Reviewed the pertinent medical history, Discussed the surgical course, Reviewed Intra-OP anesthesia mangement and issues during anesthesia, Set expectations for post-procedure period and Allowed opportunity for questions and acknowledgement of understanding      Vitals:  Vitals Value Taken Time   /79 08/24/23 1434   Temp     Pulse 86 08/24/23 1438   Resp 12    SpO2 97 % 08/24/23 1440   Vitals shown include unvalidated device data.    Electronically Signed By: MANSOOR Watkins CRNA  August 24, 2023  2:41 PM

## 2023-08-24 NOTE — ANESTHESIA POSTPROCEDURE EVALUATION
Patient: Leonid Mcmillan    Procedure: Procedure(s):  total thyroidectomy  direct laryngoscopy, flexible Bronchoscopy  Esophagoscopy  tracheal resection       Anesthesia Type:  General    Note:  Disposition: Admission   Postop Pain Control: Uneventful            Sign Out: Well controlled pain   PONV: No   Neuro/Psych: Uneventful            Sign Out: Acceptable/Baseline neuro status   Airway/Respiratory: Uneventful            Sign Out: Acceptable/Baseline resp. status   CV/Hemodynamics: Uneventful            Sign Out: Acceptable CV status; No obvious hypovolemia; No obvious fluid overload   Other NRE: NONE   DID A NON-ROUTINE EVENT OCCUR? No    Event details/Postop Comments:  No complications.           Last vitals:  Vitals Value Taken Time   /77 08/24/23 1500   Temp 37.6  C (99.7  F) 08/24/23 1434   Pulse 94 08/24/23 1515   Resp 18 08/24/23 1434   SpO2 97 % 08/24/23 1515   Vitals shown include unvalidated device data.    Electronically Signed By: Nguyễn Dan MD  August 24, 2023  3:16 PM

## 2023-08-24 NOTE — ANESTHESIA PROCEDURE NOTES
Airway       Patient location during procedure: OR       Procedure Start/Stop Times: 8/24/2023 8:03 AM  Staff -        CRNA: Katja Balderas APRN CRNA       Performed By: CRNA  Consent for Airway        Urgency: elective  Indications and Patient Condition       Indications for airway management: alfred-procedural       Induction type:other (comments) (Already under GA with LMA in place. Switched airway after bronchoscopy performed.)       Mask difficulty assessment: 0 - not attempted    Final Airway Details       Final airway type: endotracheal airway       Successful airway: NIM and Oral  Endotracheal Airway Details        ETT size (mm): 6.0       Cuffed: yes       Cuff volume (mL): 8       Successful intubation technique: video laryngoscopy       VL Blade Size: Glidescope 3       Grade View of Cords: 1       Adjucts: stylet       Position: Right       Measured from: lips       Secured at (cm): 24       Bite block used: None    Post intubation assessment        Placement verified by: capnometry, equal breath sounds and chest rise        Number of attempts at approach: 1       Secured with: pink tape       Ease of procedure: easy       Dentition: Intact and Unchanged    Medication(s) Administered   Medication Administration Time: 8/24/2023 8:03 AM

## 2023-08-24 NOTE — ANESTHESIA PROCEDURE NOTES
Airway       Patient location during procedure: OR       Procedure Start/Stop Times: 8/24/2023 7:41 AM and 8/24/2023 7:42 AM  Staff -        CRNA: Katja Balderas APRN CRNA       Performed By: CRNA  Consent for Airway        Urgency: elective  Indications and Patient Condition       Indications for airway management: alfred-procedural       Induction type:intravenous       Mask difficulty assessment: 0 - not attempted    Final Airway Details       Final airway type: supraglottic airway    Supraglottic Airway Details        Type: LMA       Brand: I-Gel       LMA size: 4    Post intubation assessment        Placement verified by: capnometry, equal breath sounds and chest rise        Number of attempts at approach: 1       Secured with: pink tape       Ease of procedure: easy       Dentition: Intact and Unchanged    Medication(s) Administered   Medication Administration Time: 8/24/2023 7:41 AM

## 2023-08-24 NOTE — BRIEF OP NOTE
[facility]    Brief Operative Note    Pre-operative diagnosis: Malignant neoplasm of thyroid gland (H) [C73]  Post-operative diagnosis Same as pre-operative diagnosis    Procedure: Procedure(s):  total thyroidectomy  direct laryngoscopy, flexible Bronchoscopy  Esophagoscopy  tracheal resection  Surgeon: Surgeon(s) and Role:  Panel 1:     * Antonio Morales MD - Primary     * Grace Garcia MD - Resident - Assisting  Panel 2:     * Pieter Ballard MD - Primary     * Johnny Marquez MD - Fellow - Assisting  Anesthesia: General   Estimated Blood Loss: Less than 100 ml    Drains: Sundeep-Velazquez  Specimens:   ID Type Source Tests Collected by Time Destination   1 : Evaluate for Right Inferior Parathyroid Tissue Other SURGICAL PATHOLOGY EXAM Antonio Morales MD 8/24/2023 10:32 AM    2 : Total thyroidectomy, tracheal resection, stitch left superior pole Tissue Other SURGICAL PATHOLOGY EXAM Antonio Morales MD 8/24/2023 11:53 AM    3 : Inferior tracheal wall Tissue Other SURGICAL PATHOLOGY EXAM Antonio Morales MD 8/24/2023 12:00 PM      Findings:   Total thyroidectomy with no neck dissection, tracheal resection of first 3 rings, anastemosed to cricoid.  .  Complications: None.  Implants: * No implants in log *

## 2023-08-25 ENCOUNTER — APPOINTMENT (OUTPATIENT)
Dept: SPEECH THERAPY | Facility: CLINIC | Age: 38
End: 2023-08-25
Attending: STUDENT IN AN ORGANIZED HEALTH CARE EDUCATION/TRAINING PROGRAM
Payer: COMMERCIAL

## 2023-08-25 LAB
ANION GAP SERPL CALCULATED.3IONS-SCNC: 12 MMOL/L (ref 7–15)
BASOPHILS # BLD AUTO: 0 10E3/UL (ref 0–0.2)
BASOPHILS NFR BLD AUTO: 0 %
BUN SERPL-MCNC: 11.2 MG/DL (ref 6–20)
CALCIUM SERPL-MCNC: 8.5 MG/DL (ref 8.6–10)
CHLORIDE SERPL-SCNC: 108 MMOL/L (ref 98–107)
CREAT SERPL-MCNC: 0.8 MG/DL (ref 0.67–1.17)
DEPRECATED HCO3 PLAS-SCNC: 22 MMOL/L (ref 22–29)
EOSINOPHIL # BLD AUTO: 0 10E3/UL (ref 0–0.7)
EOSINOPHIL NFR BLD AUTO: 0 %
ERYTHROCYTE [DISTWIDTH] IN BLOOD BY AUTOMATED COUNT: 12.3 % (ref 10–15)
GFR SERPL CREATININE-BSD FRML MDRD: >90 ML/MIN/1.73M2
GLUCOSE BLDC GLUCOMTR-MCNC: 126 MG/DL (ref 70–99)
GLUCOSE SERPL-MCNC: 140 MG/DL (ref 70–99)
HCT VFR BLD AUTO: 41.9 % (ref 40–53)
HGB BLD-MCNC: 13.9 G/DL (ref 13.3–17.7)
IMM GRANULOCYTES # BLD: 0.1 10E3/UL
IMM GRANULOCYTES NFR BLD: 0 %
LYMPHOCYTES # BLD AUTO: 0.9 10E3/UL (ref 0.8–5.3)
LYMPHOCYTES NFR BLD AUTO: 8 %
MAGNESIUM SERPL-MCNC: 2.1 MG/DL (ref 1.7–2.3)
MCH RBC QN AUTO: 29.3 PG (ref 26.5–33)
MCHC RBC AUTO-ENTMCNC: 33.2 G/DL (ref 31.5–36.5)
MCV RBC AUTO: 88 FL (ref 78–100)
MONOCYTES # BLD AUTO: 0.6 10E3/UL (ref 0–1.3)
MONOCYTES NFR BLD AUTO: 5 %
NEUTROPHILS # BLD AUTO: 9.9 10E3/UL (ref 1.6–8.3)
NEUTROPHILS NFR BLD AUTO: 87 %
NRBC # BLD AUTO: 0 10E3/UL
NRBC BLD AUTO-RTO: 0 /100
PHOSPHATE SERPL-MCNC: 2.5 MG/DL (ref 2.5–4.5)
PLATELET # BLD AUTO: 182 10E3/UL (ref 150–450)
POTASSIUM SERPL-SCNC: 3.8 MMOL/L (ref 3.4–5.3)
RBC # BLD AUTO: 4.74 10E6/UL (ref 4.4–5.9)
SODIUM SERPL-SCNC: 142 MMOL/L (ref 136–145)
WBC # BLD AUTO: 11.5 10E3/UL (ref 4–11)

## 2023-08-25 PROCEDURE — 83735 ASSAY OF MAGNESIUM: CPT | Performed by: STUDENT IN AN ORGANIZED HEALTH CARE EDUCATION/TRAINING PROGRAM

## 2023-08-25 PROCEDURE — 250N000011 HC RX IP 250 OP 636: Mod: JZ | Performed by: STUDENT IN AN ORGANIZED HEALTH CARE EDUCATION/TRAINING PROGRAM

## 2023-08-25 PROCEDURE — 250N000011 HC RX IP 250 OP 636: Mod: JZ

## 2023-08-25 PROCEDURE — 120N000002 HC R&B MED SURG/OB UMMC

## 2023-08-25 PROCEDURE — 36415 COLL VENOUS BLD VENIPUNCTURE: CPT | Performed by: STUDENT IN AN ORGANIZED HEALTH CARE EDUCATION/TRAINING PROGRAM

## 2023-08-25 PROCEDURE — 85025 COMPLETE CBC W/AUTO DIFF WBC: CPT | Performed by: STUDENT IN AN ORGANIZED HEALTH CARE EDUCATION/TRAINING PROGRAM

## 2023-08-25 PROCEDURE — 250N000009 HC RX 250

## 2023-08-25 PROCEDURE — 92610 EVALUATE SWALLOWING FUNCTION: CPT | Mod: GN

## 2023-08-25 PROCEDURE — 82310 ASSAY OF CALCIUM: CPT | Performed by: STUDENT IN AN ORGANIZED HEALTH CARE EDUCATION/TRAINING PROGRAM

## 2023-08-25 PROCEDURE — C9113 INJ PANTOPRAZOLE SODIUM, VIA: HCPCS | Mod: JZ

## 2023-08-25 PROCEDURE — 84100 ASSAY OF PHOSPHORUS: CPT | Performed by: STUDENT IN AN ORGANIZED HEALTH CARE EDUCATION/TRAINING PROGRAM

## 2023-08-25 PROCEDURE — 92526 ORAL FUNCTION THERAPY: CPT | Mod: GN

## 2023-08-25 RX ORDER — LEVOTHYROXINE SODIUM ANHYDROUS 100 UG/5ML
84 INJECTION, POWDER, LYOPHILIZED, FOR SOLUTION INTRAVENOUS DAILY
Status: DISCONTINUED | OUTPATIENT
Start: 2023-08-25 | End: 2023-08-25

## 2023-08-25 RX ORDER — LEVOTHYROXINE SODIUM ANHYDROUS 100 UG/5ML
84 INJECTION, POWDER, LYOPHILIZED, FOR SOLUTION INTRAVENOUS EVERY MORNING
Status: DISCONTINUED | OUTPATIENT
Start: 2023-08-25 | End: 2023-08-26

## 2023-08-25 RX ADMIN — HYDROMORPHONE HYDROCHLORIDE 0.4 MG: 0.2 INJECTION, SOLUTION INTRAMUSCULAR; INTRAVENOUS; SUBCUTANEOUS at 21:51

## 2023-08-25 RX ADMIN — DEXAMETHASONE SODIUM PHOSPHATE 6 MG: 4 INJECTION, SOLUTION INTRA-ARTICULAR; INTRALESIONAL; INTRAMUSCULAR; INTRAVENOUS; SOFT TISSUE at 02:42

## 2023-08-25 RX ADMIN — DEXTROSE MONOHYDRATE AND SODIUM CHLORIDE: 5; .45 INJECTION, SOLUTION INTRAVENOUS at 15:11

## 2023-08-25 RX ADMIN — DEXTROSE MONOHYDRATE AND SODIUM CHLORIDE: 5; .45 INJECTION, SOLUTION INTRAVENOUS at 04:47

## 2023-08-25 RX ADMIN — HYDROMORPHONE HYDROCHLORIDE 0.4 MG: 0.2 INJECTION, SOLUTION INTRAMUSCULAR; INTRAVENOUS; SUBCUTANEOUS at 05:08

## 2023-08-25 RX ADMIN — AMPICILLIN SODIUM AND SULBACTAM SODIUM 3 G: 2; 1 INJECTION, POWDER, FOR SOLUTION INTRAMUSCULAR; INTRAVENOUS at 15:10

## 2023-08-25 RX ADMIN — PANTOPRAZOLE SODIUM 40 MG: 40 INJECTION, POWDER, FOR SOLUTION INTRAVENOUS at 12:42

## 2023-08-25 RX ADMIN — HYDROMORPHONE HYDROCHLORIDE 0.4 MG: 0.2 INJECTION, SOLUTION INTRAMUSCULAR; INTRAVENOUS; SUBCUTANEOUS at 18:00

## 2023-08-25 RX ADMIN — AMPICILLIN SODIUM AND SULBACTAM SODIUM 3 G: 2; 1 INJECTION, POWDER, FOR SOLUTION INTRAMUSCULAR; INTRAVENOUS at 01:24

## 2023-08-25 RX ADMIN — LEVOTHYROXINE SODIUM ANHYDROUS 84 MCG: 100 INJECTION, POWDER, LYOPHILIZED, FOR SOLUTION INTRAVENOUS at 12:43

## 2023-08-25 RX ADMIN — AMPICILLIN SODIUM AND SULBACTAM SODIUM 3 G: 2; 1 INJECTION, POWDER, FOR SOLUTION INTRAMUSCULAR; INTRAVENOUS at 09:04

## 2023-08-25 RX ADMIN — HYDROMORPHONE HYDROCHLORIDE 0.4 MG: 0.2 INJECTION, SOLUTION INTRAMUSCULAR; INTRAVENOUS; SUBCUTANEOUS at 12:42

## 2023-08-25 ASSESSMENT — ACTIVITIES OF DAILY LIVING (ADL)
ADLS_ACUITY_SCORE: 30

## 2023-08-25 NOTE — PLAN OF CARE
[id number]  Status: POD#1 total thyroidectomy, direct laryngoscopy, flexible Bronchoscopy, Esophagoscopy, tracheal resection   Vitals: VSS on RA  Neuros: Intact ex generalized weakness. Hmong speaking. Wife translating.  IV: PIV infusing 0.45 NS and dextrose 5% at 100 ml/hr, 2nd PIV TKO btw IV abx  Labs/Electrolytes: WNL  Resp/trach: LSC   Diet: NPO; SLP completed swallow eval, pt still having discomfort and difficulty w/swallowing. Will remain NPO today  Bowel status: LBM PTA  : Voiding spontaneously w/out difficulty  Skin: Neck incision CECILLE, family declining bacitracin and aquaphor, ENT aware. DENA x1 w/small amt of serosang drainage  Pain: IV dilaudid given x1 for moderate neck pain  Activity: SBA w/gb. Walking to bathroom and sitting on edge of bed frequently. Pneumoboots on while in bed  Social: Wife OK to stay overnight; supportive and helpful  Plan: Continue to manage pain and monitor drain output. Per SLP, will monitor swallow over the weekend

## 2023-08-25 NOTE — PROGRESS NOTES
Pt admitted up from PACU for total thyroidectomy, direct laryngoscopy, flexible Bronchoscopy, Esophagoscopy, tracheal resection  Pt Hmong speaking his wife interprets. Pt voice whispers and is hoarse. Pt alert and oriented x4  Pt not up out of bed yet. DENA drain has bright bloody drainage 25 ml, watch to make sure it keeps suction.  Pt has sutures clean dry and intact. Pt has a looped suture to make sure he does not hyperextend his neck. Page team if suture comes undone.   Pt has pain 4/10 in throat  PIV infusing 0.45 NS and dextrose 5% at 100 ml/hr  PT has not had BM or voided since coming up from PACU.

## 2023-08-25 NOTE — PROGRESS NOTES
"Otolaryngology Progress Note  August 25, 2023    S: No acute events overnight. Afebrile, hemodynamically stable. Stable respiratory status on room air. NPO pending SLP evaluation. Voiding with high UOP. DENA drain holding suction. Voice expectedly hoarse and soft. Significant dysphagia per SLP evaluation requiring NPO status.    O: /83 (BP Location: Left arm)   Pulse 79   Temp 98  F (36.7  C) (Oral)   Resp 16   Ht 1.626 m (5' 4\")   Wt 68 kg (149 lb 14.6 oz)   SpO2 96%   BMI 25.73 kg/m     General: Alert and oriented, No acute distress. Voice hoarse and soft.    Head: Atraumatic, Normocephalic   Eyes: EOMI, sclera white   Face: Symmetrical at rest (HB 1/6).    Neck: Transverse neck incision c/d/I. Deshawn stitch in place. DENA drain holding bulb suction with sanguinous output. Trachea midline. No crepitus.   Pulmonary: Breathing non-labored, no stridor, no accessory muscle use on room air.   Extremities: Warm and well perfused. No obvious deformity.      Intake/Output Summary (Last 24 hours) at 8/25/2023 1113  Last data filed at 8/25/2023 0851  Gross per 24 hour   Intake 862.5 ml   Output 3015 ml   Net -2152.5 ml     DENA drain output(s): (last 24 hours)/(last shift)  40/10 (50)    Labs:  Mg 2.1  Phos 2.5    BMP  Recent Labs   Lab 08/25/23  0923 08/25/23  0609 08/24/23  1652 08/24/23  0556     --   --   --    POTASSIUM 3.8  --   --   --    CHLORIDE 108*  --   --   --    SUMMER 8.5*  --  8.5*  --    CO2 22  --   --   --    BUN 11.2  --   --   --    CR 0.80  --   --   --    * 126*  --  99     CBC  Recent Labs   Lab 08/25/23 0923   WBC 11.5*   RBC 4.74   HGB 13.9   HCT 41.9   MCV 88   MCH 29.3   MCHC 33.2   RDW 12.3        INRNo lab results found in last 7 days.    A/P: Leonid Deyanira is a 38 year old male with a past medical history of papillary thyroid cancer with tracheal invasion now status post total thyroidectomy, tracheal resection, and sacrifice of right recurrent laryngeal nerve. POD 1 doing " well overall with expected voice changes. He is having significant dysphagia.    Neuro:  - Pain control: Scheduled Tylenol, PRN oxycodone, dilaudid    HEENT: Invasive papillary thyroid carcinoma s/p total thyroidectomy and tracheal resection w sacrifice of R RLN  - Incision cares: clean with 0.9% sodium chloride and apply Aquaphor Q8H   - Monitor and record DENA drain output Qshift  - If not holding bulb suction please page ENT on call  - Post op dysphagia  - SLP following  - NPO  - Deshawn stitch in place, keep head neutral  - IV synthroid 84 mcg daily  - postop Decadron (complete)    Respiratory:  - YUN, breathing well on RA  - Supplemental O2 PRN to keep sats >92%    CV:  - Hemodynamically stable  - YUN      FEN/GI:  - NPO   - NG tube tomorrow if still NPO  - mIVF with D5 1/2 @ 100/hr  - Bowel regimen  - IV Protonix    :  - Voiding independently  - YUN    Endo  - YUN    H/ID:  - YUN  - Afebrile, WBC 11.5  - CTM CBC, BMP  - Periop Unasyn for 24 hours    MSK:  - YUN  - Up and ad floyd    PPX:  - lovenox  - SCDs  - IS    Dispo: 6A, ENT    -- Patient and above plan discussed with Dr. Andrew Issa MD   Department of Otolaryngology - Head and Neck Surgery, PGY 1  Please page ENT with questions

## 2023-08-25 NOTE — PROGRESS NOTES
THORACIC & FOREGUT SURGERY    S:  Pt seen at bedside resting comfortably.       O:  Vitals:    08/24/23 1800 08/24/23 1958 08/24/23 2337 08/25/23 0757   BP: 110/71 108/73 115/73 120/83   BP Location:  Left arm Right arm Left arm   Cuff Size:       Pulse:  86 95 79   Resp:  14 14 16   Temp:   99.2  F (37.3  C) 98  F (36.7  C)   TempSrc:   Oral Oral   SpO2: 94% 96% 95% 96%   Weight:       Height:           A&O, NAD  Breathing non-labored  Non-cyanotic  ND  Distal extremities warm    A/P: Leonid Deyanira is a 38 year old male POD#1 s/p Total thyroidectomy, Reimplantation of a right inferior parathyroid gland, and tracheal resection by Dr. Morales.  Thoracic surgery was present for assistance.  Pt doing well.  -ENT primary  -Thoracic to sign off  -Please feel free to call with questions     Robbi Arellano PA-C

## 2023-08-25 NOTE — PROGRESS NOTES
08/25/23 2691   Appointment Info   Signing Clinician's Name / Credentials (SLP) Yue Puga MS CCC-SLP   General Information   Onset of Illness/Injury or Date of Surgery 08/24/23   Referring Physician Grace Garcia MD   Patient/Family Therapy Goal Statement (SLP) To have improved voice and swallow function   Pertinent History of Current Problem The pt is a 38 year old male who is POD#1 s/p total thyroidectomy, tracheal resection, sacrifice of right recurrent laryngeal nerve, flexible bronchoscopy, rigid esophagoscopy, direct laryngoscopy, and reimplantation of right inferior parahthyroid gland d/t large thyroid nodule. Swallow evaluation ordered per MD d/t concerns for swallowing.   General Observations The pt is pleasant and seen at edge of bed. His wife is present and supportive.   Pain Assessment   Patient Currently in Pain Yes, see Vital Sign flowsheet   Type of Evaluation   Type of Evaluation Swallow Evaluation   Oral Motor   Oral Musculature generally intact   Structural Abnormalities none present   Mucosal Quality adequate   Dentition (Oral Motor)   Dentition (Oral Motor) natural dentition   Facial Symmetry (Oral Motor)   Facial Symmetry (Oral Motor) WNL   Lip Function (Oral Motor)   Lip Range of Motion (Oral Motor) WNL   Tongue Function (Oral Motor)   Tongue ROM (Oral Motor) WNL   Jaw Function (Oral Motor)   Jaw Function (Oral Motor) WNL   Cough/Swallow/Gag Reflex (Oral Motor)   Volitional Throat Clear/Cough (Oral Motor) reduced strength;impaired   Volitional Swallow (Oral Motor) effortful;weak   Vocal Quality/Secretion Management (Oral Motor)   Vocal Quality (Oral Motor) hoarse;other (see comments)  (low volume)   Secretion Management (Oral Motor) difficulty swallowing secretions   General Swallowing Observations   Past History of Dysphagia No hx of dysphagia prior to surgery. The pt noted coughing immediately with sips of water yesterday. He reports that his swallowing appears improved today, but  "still having difficulty with managing secretions.   Respiratory Support (General Swallowing Observations) none   Current Diet/Method of Nutritional Intake (General Swallowing Observations, NIS) NPO   Swallowing Evaluation Clinical swallow evaluation   Clinical Swallow Evaluation   Feeding Assistance set up only required   Clinical Swallow Evaluation Textures Trialed thin liquids;mildly thick liquids;pureed   Clinical Swallow Eval: Thin Liquid Texture Trial   Mode of Presentation, Thin Liquids spoon;straw;self-fed   Volume of Liquid or Food Presented 2 sips   Oral Phase of Swallow effortful AP movement   Pharyngeal Phase of Swallow coughing/choking;feeling of something stuck in throat;repeated swallows;throat clearing   Diagnostic Statement Overt s/s of aspiration includin+ swallows per bolus, throat clearing, coughing, and pt sensation of aspiration.   Clinical Swallow Eval: Mildly Thick Liquids   Mode of Presentation straw;self-fed   Volume Presented small sip x2   Oral Phase WFL   Pharyngeal Phase throat clearing;repeated swallows;feeling of something stuck in throat   Diagnostic Statement No overt coughing, however ongoing throat clearing and sensation of aspiration.   Clinical Swallow Evaluation: Puree Solid Texture Trial   Mode of Presentation, Puree self-fed   Volume of Puree Presented 1 bite   Oral Phase, Puree WFL   Pharyngeal Phase, Puree feeling of something stuck in throat;repeated swallows;throat clearing   Diagnostic Statement Pt endorsing difficulty with swallowing, 4 swallows per bite. He felt like the food was \"spilling over the wrong way\".   Esophageal Phase of Swallow   Patient reports or presents with symptoms of esophageal dysphagia No   Swallowing Recommendations   Diet Consistency Recommendations NPO;ice chips only   Supervision Level for Intake distant supervision needed   Medication Administration Recommendations, Swallowing (SLP) via non-oral means as able, essential meds crushed with " "puree   Instrumental Assessment Recommendations FEES (fiberoptic endoscopic evaluation of swallowing)  (pending progress with swallow and vocal funciton)   General Therapy Interventions   Planned Therapy Interventions Dysphagia Treatment   Dysphagia treatment Modified diet education;Instruction of safe swallow strategies;Compensatory strategies for swallowing;Oropharyngeal exercise training   Clinical Impression   Criteria for Skilled Therapeutic Interventions Met (SLP Kanika) Yes, treatment indicated   SLP Diagnosis Severe oropharyngeal dysphagia   Risks & Benefits of therapy have been explained evaluation/treatment results reviewed;care plan/treatment goals reviewed;risks/benefits reviewed;current/potential barriers reviewed;patient;participants included;participants voiced agreement with care plan;spouse/significant other   Clinical Impression Comments Clinical swallow evaluation completed per MD order. The pt presents with severe oropharyngeal dysphagia in setting of total thyroidectomy with tracheal resection and sacrifice of right recurrent laryngeal nerve. Pt assessed with thin liquids, mildly thick liquids, and puree solids. Oral mech significant for hoarse/weak vocal quality, and reduced cough reflex. Pt with throat clearing on secretions at baseline and endorsed difficulty with secretions. Oral phase was grossly WFL, some effortful bolus propulsion suspect d/t odynophagia. Overt s/s of aspiration across consistencies including: coughing, throat clearing, multiple swallows per bolus, and sensation of PO sticking and \"spilling over the wrong way\". Less s/s of aspiration with puree solids as compared to liquids, however concern for pharyngeal residuals noted. Pt was not impulsive with bite/sip size and was very aware of swallow difficulties. Recommend NPO with thorough oral cares and ice chips/swabs for comfort. Recommend non-oral means of medications as able, essential meds crushed with puree as needed. SLP " will follow for dysphagia and request instrumental evaluation as appropriate. Anticipate ongoing SLP services at d/c given swallow and vocal impairments from baseline.   SLP Total Evaluation Time   Eval: oral/pharyngeal swallow function, clinical swallow Minutes ([id number]) 18   SLP Discharge Planning   SLP Plan PO readiness, FEES if indicated - SLP to order as appropriate   SLP Discharge Recommendation home with outpatient speech therapy   SLP Rationale for DC Rec Anticipate pt will benefit from OP SLP for swallow and vocal function at time of discharge.   SLP Brief overview of current status  Recommend NPO with thorough oral cares and ice chips/swabs for comfort. Recommend non-oral means of medications as able, essential meds crushed with puree as needed. SLP will follow for dysphagia and request instrumental evaluation as appropriate. Anticipate ongoing SLP services at d/c given swallow and vocal impairments from baseline.

## 2023-08-25 NOTE — OP NOTE
Procedure Date: 08/24/2023    SURGEON:  Antonio Morales MD    COSURGEON: Pieter Carias MD    ASSISTANT:  Grace Garcia MD    PREOPERATIVE DIAGNOSIS:  T4 papillary thyroid cancer of the right thyroid lobe with tracheal invasion.    POSTOPERATIVE DIAGNOSIS:  T4 papillary thyroid cancer of the right thyroid lobe with tracheal invasion.    PROCEDURES:  1.  Total thyroidectomy.  2.  Reimplantation of a right inferior parathyroid gland.  3.  Tracheal resection.  Three tracheal rings were removed extending inferiorly from the cricoid.  4.  Primary tracheal anastomosis.  5.  Sacrifice of the right recurrent laryngeal nerve.  6.  Flexible bronchoscopy.  7.  Direct laryngoscopy.  8.  Rigid esophagoscopy.     ANESTHESIA:  General.    ESTIMATED BLOOD LOSS:  50 mL.    SPECIMENS:  Total thyroidectomy and tracheal resection.    COMPLICATIONS:  None.    OPERATIVE INDICATIONS:  The patient is a pleasant 38-year-old male, who I met for evaluation of a large thyroid nodule.  Fine needle aspirate was consistent with papillary thyroid cancer.  On his prior imaging from several months before I met him, I was concerned about tracheal invasion with intraluminal disease.  Updated scans were obtained that had similar concerns.  I brought him to the operating room for a bronchoscopy and esophagoscopy.  There was evidence of transmural invasion of the trachea.  There was no evidence of disease in the esophagus. We recommended proceeding with the above-listed procedure.  After full discussion of the risks, benefits of the procedure, informed consent was obtained.    OPERATIVE FINDINGS:  Total thyroidectomy was performed. The left recurrent laryngeal nerve was preserved.  The right recurrent laryngeal nerve was encased in tumor and was unable to be preserved.  A tracheal resection was performed extending over a length of 3 tracheal rings.  Primary cricotracheal anastomosis was performed. There was not an identified proximal RLN and therefore no  nerve anastomosis was performed.     OPERATIVE COURSE:  The patient was brought to the operating room and placed on the operating table supine.  General anesthesia was induced initially with a laryngeal mask airway.  Timeout was performed to identify the patient and correctness of the procedure.  We started with a flexible bronchoscopy.  The flexible bronchoscope was advanced through the laryngeal mask airway into the trachea.  This was used to examine the tumor.  The tumor was approximately 4 cm below the cricoid and extended over a length of 2 cm.  At this point, the patient was intubated with a size 6 nerve monitoring endotracheal tube.  Direct laryngoscopy was then performed after protection of the upper dentition.  There were no concerning lesions seen in the pharynx.  A rigid esophagoscope was then advanced into the cervical esophagus, and there was no evidence of disease in the esophagus.  A standard incision was marked approximately 1 cm below the cricoid notch.  This was infiltrated with 1% lidocaine with 1:100,000 epinephrine.  The patient was prepped and draped in standard fashion.  A timeout was performed to identify the patient and correctness of the procedure.  Incision was made through the skin and subcutaneous tissue.  The platysma muscle was divided.  Standard subplatysmal flaps were elevated superiorly and inferiorly.  The midline raphe was then opened.  The sternohyoid muscles were elevated from the sternothyroid muscles bilaterally.  The sternothyroid muscle was divided midway along their course, and the muscle belly was reflected superiorly and inferiorly.  On the right side, I brought the sternothyroid muscle with the tumor.  The anterior wall of the trachea was then identified.    We began the operation on the left side.  The recurrent laryngeal nerve was identified inferiorly.  The inferior parathyroid gland was reflected off the thyroid lobe.  Attention was turned to the superior pole where  Joll's space was opened.  The superior pole was grasped with an Allis clamp, and the superior vascular pedicle divided over clips.  The recurrent laryngeal nerve was traced into the cricothyroid joint.  Sharp's ligament was divided.  Attention was then turned to the right side.  The right thyroid lobe was immobile.  We first mobilized it from the jugular vein and carotid artery.  Next, we released the superior pole over clips.  We continued our dissection laterally along the thyroid capsule.  It was then released off of the trachea inferiorly.  The recurrent laryngeal nerve was found inferiorly.  We began tracing this superiorly.  An inferior parathyroid gland was identified.  This was confirmed on frozen section and later reimplanted into the right sternocleidomastoid muscle and marked with 3 small clips. It became apparent that the recurrent laryngeal nerve was encased in tumor.  I spent an extended amount of time trying to preserve this. It became apparent that it could not be preserved and therefore it was sacrificed.  The tumor was then elevated from the esophagus and the posterolateral aspect of the trachea.  Next, the superior dissection was performed releasing the specimen off of the cricoid.  It was now time to perform the tracheal resection.  A flexible bronchoscope was advanced through the endotracheal tube to identify the site of the inferior tracheotomy.  The inferior tracheotomy was made, and a size 5 endotracheal tube via a sterile circuit was used to ventilate the patient.  The posterior wall incision was made and the party wall was dissected.  Superiorly, the cut was made at the inferior aspect of the cricoid cartilage.  Both the inferior and superior cuts appeared to preserve a nice gross margin around the tumor.  The circumferential cuts were made superiorly and the specimen was released.  Frozen sections were sent from the superior and inferior trachea, both of which were negative for malignancy.   We then prepared for primary tracheal anastomosis.  I performed an anterior released down to the perla.  Dr. Ballard and his team entered the case as well and perform further released anteriorly and posteriorly.  We appeared to have a nice tension-free closure.  3-0 PDS sutures were used for stay sutures laterally.  The posterior wall was anastomosed with a 4-0 PDS in a running fashion.  The anterior wall was closed with 3-0 PDS in a simple interrupted fashion.  Prior to the anastomosis, the orotracheal tube was advanced beyond the anastomosis.  We appeared to have a nice primary tracheal anastomosis.  The wound was irrigated and hemostasis obtained.  There was no evidence of a leak with a Valsalva.  We then began our closure.  A 3-0 Vicryl was used to approximate the strap musculature, leaving a gap inferiorly.  A 15 round DENA drain was placed and secured with 3-0 nylon suture.  The platysmal layer was closed with 3-0 Vicryl and the skin closed with 4-0 nylon.  The patient tolerated the procedure well.  He was subsequently turned over to anesthesia, where he was awakened, extubated, and transferred to the recovery room in stable condition.    Antonio Morales MD        D: 2023   T: 2023   MT: [email or web address]    Name:     LIZBETH ESTRADA  MRN:      [id number]        Account:        [id number]   :      1985           Procedure Date: 2023     Document: [id number]

## 2023-08-25 NOTE — PLAN OF CARE
Status: POD#1 total thyroidectomy, direct laryngoscopy, flexible Bronchoscopy, Esophagoscopy, tracheal resection   Vitals: VSS on RA  Neuros: Intact. Hmong speaking. Wife translating.  IV: PIV infusing 0.45 NS and dextrose 5% at 100 ml/hr, other PIV TKO  Labs/Electrolytes: WNL  Resp/trach: LSC   Diet: Difficulty initiating swallow. Pt started coughing when attempting to swallow small sip of water. ENT notified and pt made strict NPO until SLP  Bowel status: LBM PTA  : Voiding spontaneously in the BR  Skin: see below  Pain: partially managed with PRN IV dilaudid  Activity: A1, GB  Social: Wife OK to stay   Plan: Monitor drain. SLP consult. Pain management. Continue to monitor and follow POC.      Arrived from: PACU   Belongings/meds: clothes  2 RN Skin Assessment Completed by: SANJAY Page & SANJAY Edwards    Non-intact findings documented (yes/no/NA): looped suture at neck, midline neck incision w/ x1 DENA

## 2023-08-25 NOTE — PHARMACY-ADMISSION MEDICATION HISTORY
Pharmacy Intern Admission Medication History    Admission medication history is complete. The information provided in this note is only as accurate as the sources available at the time of the update.    Medication reconciliation/reorder completed by provider prior to medication history? Yes    Information Source(s): Patient, Family member, and CareEverywhere/SureScripts via in-person    Pertinent Information: Per patient and his spouse reports that he is not taking any medication. The list is verified with CareEverywhere and most recent dispensing history. Per pt's wife reports he stopped his vitamin more than 2 weeks prior to the surgery.    Changes made to PTA medication list:  Added: None  Deleted: None  Changed: None         Allergies reviewed with patient and updates made in EHR: yes    Medication History Completed By: Momo Purcell 8/25/2023 6:01 PM    Prior to Admission medications    Not on File

## 2023-08-25 NOTE — OP NOTE
Preoperative diagnosis:                         T4 papillary thyroid cancer of the right thyroid lobe with tracheal invasion.   Postoperative diagnosis:                       T4 papillary thyroid cancer of the right thyroid lobe with tracheal invasion.     Procedure:   Tracheal resection    Anesthesia: General   Surgeon: Pieter Ballard co-surgeon for  Dr. Morales  Resident surgeon: Johnny Marquez MD    Findings:  Anastomosis intact with no evidence of leak     Description of procedure    I assisted Dr. Morales  with the tracheal anastomosis after the tumor was resected. I performed anterior and posterior dissection of the trachea with blunt dissection. We then performed the anastomosis as described in  Dr. Morales's note. A leak test showed the anastomosis to be intact.

## 2023-08-26 ENCOUNTER — APPOINTMENT (OUTPATIENT)
Dept: SPEECH THERAPY | Facility: CLINIC | Age: 38
End: 2023-08-26
Attending: STUDENT IN AN ORGANIZED HEALTH CARE EDUCATION/TRAINING PROGRAM
Payer: COMMERCIAL

## 2023-08-26 LAB — GLUCOSE BLDC GLUCOMTR-MCNC: 100 MG/DL (ref 70–99)

## 2023-08-26 PROCEDURE — 250N000011 HC RX IP 250 OP 636: Performed by: STUDENT IN AN ORGANIZED HEALTH CARE EDUCATION/TRAINING PROGRAM

## 2023-08-26 PROCEDURE — 92526 ORAL FUNCTION THERAPY: CPT | Mod: GN

## 2023-08-26 PROCEDURE — 250N000013 HC RX MED GY IP 250 OP 250 PS 637

## 2023-08-26 PROCEDURE — 120N000002 HC R&B MED SURG/OB UMMC

## 2023-08-26 PROCEDURE — 250N000009 HC RX 250

## 2023-08-26 PROCEDURE — 250N000013 HC RX MED GY IP 250 OP 250 PS 637: Performed by: STUDENT IN AN ORGANIZED HEALTH CARE EDUCATION/TRAINING PROGRAM

## 2023-08-26 RX ORDER — LEVOTHYROXINE SODIUM 88 UG/1
88 TABLET ORAL
Status: DISCONTINUED | OUTPATIENT
Start: 2023-08-27 | End: 2023-08-26

## 2023-08-26 RX ORDER — PANTOPRAZOLE SODIUM 40 MG/1
40 TABLET, DELAYED RELEASE ORAL
Status: DISCONTINUED | OUTPATIENT
Start: 2023-08-27 | End: 2023-08-28 | Stop reason: HOSPADM

## 2023-08-26 RX ORDER — LEVOTHYROXINE SODIUM 112 UG/1
112 TABLET ORAL
Status: DISCONTINUED | OUTPATIENT
Start: 2023-08-27 | End: 2023-08-28 | Stop reason: HOSPADM

## 2023-08-26 RX ADMIN — WHITE PETROLATUM: 1.75 OINTMENT TOPICAL at 15:51

## 2023-08-26 RX ADMIN — HYDROMORPHONE HYDROCHLORIDE 0.4 MG: 0.2 INJECTION, SOLUTION INTRAMUSCULAR; INTRAVENOUS; SUBCUTANEOUS at 03:10

## 2023-08-26 RX ADMIN — ACETAMINOPHEN 975 MG: 325 SOLUTION ORAL at 21:40

## 2023-08-26 RX ADMIN — WHITE PETROLATUM: 1.75 OINTMENT TOPICAL at 23:54

## 2023-08-26 RX ADMIN — ACETAMINOPHEN 975 MG: 325 SOLUTION ORAL at 14:39

## 2023-08-26 RX ADMIN — DEXTROSE MONOHYDRATE AND SODIUM CHLORIDE: 5; .45 INJECTION, SOLUTION INTRAVENOUS at 10:49

## 2023-08-26 RX ADMIN — LEVOTHYROXINE SODIUM ANHYDROUS 84 MCG: 100 INJECTION, POWDER, LYOPHILIZED, FOR SOLUTION INTRAVENOUS at 09:43

## 2023-08-26 RX ADMIN — DEXTROSE MONOHYDRATE AND SODIUM CHLORIDE: 5; .45 INJECTION, SOLUTION INTRAVENOUS at 00:55

## 2023-08-26 RX ADMIN — SENNOSIDES AND DOCUSATE SODIUM 1 TABLET: 50; 8.6 TABLET ORAL at 20:05

## 2023-08-26 RX ADMIN — HYDROMORPHONE HYDROCHLORIDE 0.4 MG: 0.2 INJECTION, SOLUTION INTRAMUSCULAR; INTRAVENOUS; SUBCUTANEOUS at 00:07

## 2023-08-26 RX ADMIN — DEXTROSE MONOHYDRATE AND SODIUM CHLORIDE: 5; .45 INJECTION, SOLUTION INTRAVENOUS at 20:51

## 2023-08-26 ASSESSMENT — ACTIVITIES OF DAILY LIVING (ADL)
ADLS_ACUITY_SCORE: 27
ADLS_ACUITY_SCORE: 30
ADLS_ACUITY_SCORE: 27

## 2023-08-26 NOTE — PROGRESS NOTES
"Otolaryngology Progress Note  August 26, 2023    S: No acute events overnight, remains afebrile without respiratory distress. Slept better last night per wife at bedside. Having some blood-tinged phlegm that has improved. Had SLP eval yesterday with NPO recommended, though has been okay with pudding textures so will be re-evaluated today. Voiding appropriately. DENA holding suction. Voice expectedly hoarse and stable. No acute questions or concerns.    O: /85 (BP Location: Left arm)   Pulse 68   Temp 98.8  F (37.1  C) (Oral)   Resp 16   Ht 1.626 m (5' 4\")   Wt 68 kg (149 lb 14.6 oz)   SpO2 97%   BMI 25.73 kg/m     General: Alert and oriented, No acute distress. Voice hoarse and soft.    Head: Atraumatic, Normocephalic   Eyes: EOMI, sclera white   Face: Symmetrical at rest (HB 1/6).    Neck: Transverse neck incision c/d/I. Deshawn stitch in place. DENA drain holding bulb suction with serosanguinous output. Trachea midline. No crepitus.   Pulmonary: Breathing non-labored, no stridor, no accessory muscle use on room air.   Extremities: Warm and well perfused. No obvious deformity.      Intake/Output Summary (Last 24 hours) at 8/25/2023 1113  Last data filed at 8/25/2023 0851  Gross per 24 hour   Intake 862.5 ml   Output 3015 ml   Net -2152.5 ml     DENA drain output(s): (last 24 hours)/(last shift)  1.5/10/7.5 (19)    Labs:  Mg 2.1  Phos 2.5    BMP  Recent Labs   Lab 08/26/23  0611 08/25/23  0923 08/25/23  0609 08/24/23  1652 08/24/23  0556   NA  --  142  --   --   --    POTASSIUM  --  3.8  --   --   --    CHLORIDE  --  108*  --   --   --    SUMMER  --  8.5*  --  8.5*  --    CO2  --  22  --   --   --    BUN  --  11.2  --   --   --    CR  --  0.80  --   --   --    * 140* 126*  --  99       CBC  Recent Labs   Lab 08/25/23  0923   WBC 11.5*   RBC 4.74   HGB 13.9   HCT 41.9   MCV 88   MCH 29.3   MCHC 33.2   RDW 12.3          INRNo lab results found in last 7 days.    A/P: Leonid Mcmillan is a 38 year old male with " a past medical history of papillary thyroid cancer with tracheal invasion now status post total thyroidectomy, tracheal resection, and sacrifice of right recurrent laryngeal nerve. POD 2 doing well overall with expected voice changes and dysphagia but cleared for diet per SLP.    Neuro:  - Pain control: Scheduled Tylenol, PRN oxycodone, dilaudid    HEENT: Invasive papillary thyroid carcinoma s/p total thyroidectomy and tracheal resection w sacrifice of R RLN  - Incision cares: clean with 0.9% sodium chloride and apply Aquaphor Q8H   - Monitor and record DENA drain output Qshift  - If not holding bulb suction please page ENT on call  - Post op dysphagia  - SLP cleared for soft diet, ordered  - Deshawn stitch in place, keep head neutral  - IV synthroid 84 mcg daily  - postop Decadron (complete)    Respiratory:  - YUN, breathing well on RA  - Supplemental O2 PRN to keep sats >92%  - If patient develops respiratory distress, please page ENT immediately    CV:  - Hemodynamically stable  - YUN      FEN/GI:  - NPO   - NG tube tomorrow if still NPO  - mIVF with D5 1/2 @ 100/hr. Cam discontinue when taking good PO.  - Bowel regimen  - Protonix from IV to PO today    :  - Voiding independently  - YUN    Endo  - Levothyroxine to PO from IV today    H/ID:  - YUN  - Afebrile, WBC 11.5  - CTM CBC, BMP  - Periop Unasyn for 24 hours complete    MSK:  - YUN  - Up and ad floyd    PPX:  - lovenox  - SCDs  - IS    Dispo: 6A, ENT    -- Patient and above plan discussed with Dr. Andrew Eugene MD  Otolaryngology-Head & Neck Surgery PGY-2  Please contact ENT with questions by dialing [phone] and entering job code [phone] when prompted.

## 2023-08-26 NOTE — PLAN OF CARE
Status: POD#2 total thyroidectomy, direct laryngoscopy, flexible Bronchoscopy, Esophagoscopy, tracheal resection   Vitals: VSS. On RA  Neuros: A&Ox4. Hmong speaking - wife translating. Generalized weakness.  IV: L PIV infusing dextrose 5% and 0.45% NS at 100 mL/hr. R PIV SL  Labs/Electrolytes: WNL  Resp/trach: denies sob. LS diminished. Clearing throat intermittently, pt educated and understanding of not coughing strongly.   Diet: Level 6 w/ thins, meds crushed in applesauce  Bowel status: BM PTA, BS+  : voiding spontaneously   Skin: Neck incision, CECILLE and approximated. DENA x1, suctioned maintained. Deshawn stitch in place.   Pain: Denies  Activity: up in room independently with wife  Social: pt's wife at bedside for interpreting   Plan: Pain management, monitor DENA drain, monitor for bleeding. Continue with current poc.

## 2023-08-26 NOTE — PLAN OF CARE
Status: POD#1 total thyroidectomy, direct laryngoscopy, flexible Bronchoscopy, Esophagoscopy, tracheal resection   Vitals: VSS on RA  Neuros: A&Ox4. Anxious per wife. Intact ex generalized weakness. Hmong speaking. Wife translating.  IV: PIV infusing 0.45 NS and dextrose 5% at 100 ml/hr, 2nd PIV SL, IV abx completed  Labs/Electrolytes: WNL  Resp/trach: Coughing/clearing throat intermittently, pt educated to try not to cough strongly. Coughed up small amt of blood x1, ENT aware, will continue to monitor  Diet: NPO; SLP following  Bowel status: LBM PTA, +BS  : Voiding spontaneously w/out difficulty  Skin: Neck incision CECILLE, family declining bacitracin and aquaphor, ENT aware. DENA x1 w/small amt of serosang drainage, suction maintained  Pain: IV dilaudid given x2 for moderate neck pain  Activity: Walking to bathroom independently w/wife. Pneumoboots on while in bed  Social: Wife OK to stay overnight; supportive and helpful  Plan: Continue to manage pain, bleeding, and monitor drain. Pt very worried he will have to have another surgery since he is not feeling well. Wife and writer reassured pt. Per SLP, will monitor swallow over the weekend

## 2023-08-26 NOTE — PLAN OF CARE
Status: POD#2 total thyroidectomy, direct laryngoscopy, flexible Bronchoscopy, Esophagoscopy, tracheal resection   Vitals: VSS. On continuous pulse ox overnight   Neuros: A&Ox4. Hmong speaking - wife translating. Generalized weakness.  IV: L PIV infusing dextrose 5% and 0.45% NS at 100 mL/hr. R PIV SL  Labs/Electrolytes: WNL  Resp/trach: denies sob. LS diminished. Clearing throat intermittently, pt educated and understanding of not coughing strongly.   Diet: NPO, SLP following  Bowel status: BM PTA, BS+  : voiding spontaneously   Skin: Neck incision, CECILLE and approximated. DENA x1, suctioned maintained. Deshawn stitch in place.   Pain: IV dilaudid given x2 for throat/neck pain, effective   Activity: up in room independently with wife  Social: pt's wife at bedside for interpreting   Plan: Pain management, monitor DENA drain, monitor for bleeding. Continue with current poc.       Goal Outcome Evaluation:      Plan of Care Reviewed With: patient    Overall Patient Progress: improving    Outcome Evaluation: patient was able to get sleep overnight

## 2023-08-27 ENCOUNTER — APPOINTMENT (OUTPATIENT)
Dept: SPEECH THERAPY | Facility: CLINIC | Age: 38
End: 2023-08-27
Attending: STUDENT IN AN ORGANIZED HEALTH CARE EDUCATION/TRAINING PROGRAM
Payer: COMMERCIAL

## 2023-08-27 LAB
ANION GAP SERPL CALCULATED.3IONS-SCNC: 12 MMOL/L (ref 7–15)
BUN SERPL-MCNC: 6.4 MG/DL (ref 6–20)
CALCIUM SERPL-MCNC: 8.2 MG/DL (ref 8.6–10)
CHLORIDE SERPL-SCNC: 109 MMOL/L (ref 98–107)
CREAT SERPL-MCNC: 0.7 MG/DL (ref 0.67–1.17)
DEPRECATED HCO3 PLAS-SCNC: 21 MMOL/L (ref 22–29)
ERYTHROCYTE [DISTWIDTH] IN BLOOD BY AUTOMATED COUNT: 12.1 % (ref 10–15)
GFR SERPL CREATININE-BSD FRML MDRD: >90 ML/MIN/1.73M2
GLUCOSE SERPL-MCNC: 104 MG/DL (ref 70–99)
HCT VFR BLD AUTO: 39.5 % (ref 40–53)
HGB BLD-MCNC: 13.1 G/DL (ref 13.3–17.7)
MCH RBC QN AUTO: 29.2 PG (ref 26.5–33)
MCHC RBC AUTO-ENTMCNC: 33.2 G/DL (ref 31.5–36.5)
MCV RBC AUTO: 88 FL (ref 78–100)
PLATELET # BLD AUTO: 169 10E3/UL (ref 150–450)
POTASSIUM SERPL-SCNC: 3.3 MMOL/L (ref 3.4–5.3)
RBC # BLD AUTO: 4.48 10E6/UL (ref 4.4–5.9)
SODIUM SERPL-SCNC: 142 MMOL/L (ref 136–145)
WBC # BLD AUTO: 5.2 10E3/UL (ref 4–11)

## 2023-08-27 PROCEDURE — 92526 ORAL FUNCTION THERAPY: CPT | Mod: GN

## 2023-08-27 PROCEDURE — 120N000002 HC R&B MED SURG/OB UMMC

## 2023-08-27 PROCEDURE — 250N000013 HC RX MED GY IP 250 OP 250 PS 637: Performed by: STUDENT IN AN ORGANIZED HEALTH CARE EDUCATION/TRAINING PROGRAM

## 2023-08-27 PROCEDURE — 80048 BASIC METABOLIC PNL TOTAL CA: CPT

## 2023-08-27 PROCEDURE — 85027 COMPLETE CBC AUTOMATED: CPT

## 2023-08-27 PROCEDURE — 36415 COLL VENOUS BLD VENIPUNCTURE: CPT

## 2023-08-27 RX ADMIN — ACETAMINOPHEN 650 MG: 325 SOLUTION ORAL at 20:51

## 2023-08-27 RX ADMIN — LEVOTHYROXINE SODIUM 112 MCG: 0.11 TABLET ORAL at 08:45

## 2023-08-27 RX ADMIN — WHITE PETROLATUM: 1.75 OINTMENT TOPICAL at 23:50

## 2023-08-27 RX ADMIN — WHITE PETROLATUM: 1.75 OINTMENT TOPICAL at 08:45

## 2023-08-27 ASSESSMENT — ACTIVITIES OF DAILY LIVING (ADL)
ADLS_ACUITY_SCORE: 27

## 2023-08-27 NOTE — PLAN OF CARE
Status: POD#3 total thyroidectomy, direct laryngoscopy, flexible Bronchoscopy, Esophagoscopy, tracheal resection    Vitals: VSS on RA ex/ MD notified for bradycardia as low as high 40's.   Neuros: A&Ox4. Hmong speaking - wife translating. Generalized weakness   IV: L PIV infusing dextrose 5% and 0.45% NS at 100 mL/hr. R PIV SL   Labs/Electrolytes: WNL  Resp/trach: Continuous pulse ox in high 90's. Throat discomfort/pain with swallowing.   Diet: Level 6 w/ thins, meds crushed in applesauce   Bowel status: No BM this shift, LBM 8/24. Passing gas. BS+. Has scheduled miralax and senna. PRN MOM offered but declined   : voiding spontaneously    Skin: Neck incision CECILLE with sutures with DENA x1 with 2.5mL output this shift   Pain: Denies, refused scheduled tylenol this shift   Activity: up in room independently with wife   Social: Wife at the bedside to help with interpretation, helpful and very involved in cares  Plan: Pain management, monitor DENA drain, monitor for bleeding. Continue with current poc.        **MIVF discontinued, PIV SL

## 2023-08-27 NOTE — PROGRESS NOTES
"Otolaryngology Progress Note  August 27, 2023    S: No acute events overnight. Remains afebrile without respiratory distress. Cleared for soft diet by SLP. Per wife at bedside, patient has been taking a good amount of PO and it is going well. Per RN, he tends to only respond and be active with his wife, so they have a hard time monitoring PO intake. Voiding appropriately. DENA holding suction. Voice stable, no acute questions or concerns.     O: /79 (BP Location: Left arm)   Pulse 59   Temp 98.3  F (36.8  C) (Oral)   Resp 18   Ht 1.626 m (5' 4\")   Wt 68 kg (149 lb 14.6 oz)   SpO2 98%   BMI 25.73 kg/m     General: Alert and oriented, No acute distress. Voice hoarse and soft.    Head: Atraumatic, Normocephalic   Eyes: EOMI, sclera white   Face: Symmetrical at rest (HB 1/6).    Neck: Transverse neck incision c/d/I. Deshawn stitch in place. DENA drain holding bulb suction with serosanguinous output. Trachea midline. No crepitus.   Pulmonary: Breathing non-labored, no stridor, no accessory muscle use on room air.   Extremities: Warm and well perfused. No obvious deformity.      Intake/Output Summary (Last 24 hours) at 8/25/2023 1113  Last data filed at 8/25/2023 0851  Gross per 24 hour   Intake 862.5 ml   Output 3015 ml   Net -2152.5 ml     DENA drain output(s): (last 24 hours)/(last shift)   2.5/7/NR (9.5)    Labs:  Mg 2.1  Phos 2.5    BMP  Recent Labs   Lab 08/27/23  0748 08/26/23  0611 08/25/23  0923 08/25/23  0609 08/24/23  1652     --  142  --   --    POTASSIUM 3.3*  --  3.8  --   --    CHLORIDE 109*  --  108*  --   --    SUMMER 8.2*  --  8.5*  --  8.5*   CO2 21*  --  22  --   --    BUN 6.4  --  11.2  --   --    CR 0.70  --  0.80  --   --    * 100* 140* 126*  --        CBC  Recent Labs   Lab 08/27/23  0748 08/25/23  0923   WBC 5.2 11.5*   RBC 4.48 4.74   HGB 13.1* 13.9   HCT 39.5* 41.9   MCV 88 88   MCH 29.2 29.3   MCHC 33.2 33.2   RDW 12.1 12.3    182       INRNo lab results found in last 7 " days.    A/P: Leonid Deyanira is a 38 year old male with a past medical history of papillary thyroid cancer with tracheal invasion now status post total thyroidectomy, tracheal resection, and sacrifice of right recurrent laryngeal nerve 8/24/23. POD 3 doing well overall with expected voice changes and dysphagia but cleared for diet per SLP.    Neuro:  - Pain control: Scheduled Tylenol, PRN oxycodone, dilaudid    HEENT: Invasive papillary thyroid carcinoma s/p total thyroidectomy and tracheal resection w sacrifice of R RLN  - Incision cares: clean with 0.9% sodium chloride and apply Aquaphor Q8H   - Monitor and record DENA drain output Qshift  - If not holding bulb suction please page ENT on call  - Post op dysphagia  - SLP cleared for soft diet, ordered  - Deshawn stitch in place, keep head neutral  - IV synthroid 84 mcg daily  - postop Decadron (complete)    Respiratory:  - YUN, breathing well on RA  - Supplemental O2 PRN to keep sats >92%  - If patient develops respiratory distress, please page ENT immediately    CV:  - Hemodynamically stable  - YUN      FEN/GI:  - Soft diet with thin liquids  - Calorie counts started. Continue to encourage good PO with supplements and ensure wife is communicating with RN when patient is eating  - Bowel regimen, PPI  - Electrolyte replacement PRN    :  - Voiding independently  - YUN    Endo  - Levothyroxine    H/ID:  - YUN  - Afebrile, WBC 5.2 (11.5)  - CTM CBC, BMP  - Periop Unasyn for 24 hours complete    MSK:  - YUN  - Up and ad floyd    PPX:  - lovenox  - SCDs  - IS    Dispo: 6A, ENT    -- Patient and above plan discussed with Dr. Andrew Eugene MD  Otolaryngology-Head & Neck Surgery PGY-2  Please contact ENT with questions by dialing [phone] and entering job code [phone] when prompted.

## 2023-08-27 NOTE — PLAN OF CARE
Status: POD#3 total thyroidectomy, direct laryngoscopy, flexible Bronchoscopy, Esophagoscopy, tracheal resection   Vitals: VSS. On RA  Neuros: A&Ox4. Hmong speaking - wife translating. Generalized weakness.  IV: PIV SL x2.  Labs/Electrolytes: WNL  Resp/trach: denies sob. LS diminished. Clearing throat intermittently, pt educated and understanding of not coughing strongly.   Diet: Level 6 w/ thins, meds crushed in applesauce, calorie counts.  Bowel status: BM today  : voiding spontaneously   Skin: Neck incision, CECILLE and approximated. DENA x1, suctioned maintained. Deshawn stitch in place.   Pain: Denies  Activity: up in room independently with wife  Social: pt's wife at bedside for interpreting   Plan: Pain management, monitor DENA drain, monitor for bleeding. Continue with current poc.

## 2023-08-27 NOTE — PLAN OF CARE
Status: POD#2 total thyroidectomy, direct laryngoscopy, flexible Bronchoscopy, Esophagoscopy, tracheal resection    Vitals: VSS. On RA   Neuros: A&Ox4. Hmong speaking - wife translating. Generalized weakness   IV: L PIV infusing dextrose 5% and 0.45% NS at 100 mL/hr. R PIV SL   Labs/Electrolytes: WNL  Resp/trach: WNL, denies SOB, pt education on proper cough  Diet: Level 6 w/ thins, meds crushed in applesauce   Bowel status: BM PTA, BS+   : voiding spontaneously    Skin: Neck incision, CECILLE and approximated. DENA x1, suctioned maintained. Deshawn stitch in place. Pt accepted aquaphor to incision this madisyn  Pain: Denies, scheduled tylenol given   Activity: up in room independently with wife   Social: Wife at the bedside to help with interpretation, helpful and very involved in cares  Plan: Pain management, monitor DENA drain, monitor for bleeding. Continue with current poc.

## 2023-08-27 NOTE — PROVIDER NOTIFICATION
"Dr. Bojorquez with ENT paged:    [phone] Deyanira, G    \"Patient is bradycardic in low 50s, lowest heart rate of 49. He is asymptomatic.\"  "

## 2023-08-28 VITALS
WEIGHT: 149.91 LBS | HEIGHT: 64 IN | DIASTOLIC BLOOD PRESSURE: 86 MMHG | HEART RATE: 64 BPM | SYSTOLIC BLOOD PRESSURE: 120 MMHG | BODY MASS INDEX: 25.59 KG/M2 | OXYGEN SATURATION: 99 % | RESPIRATION RATE: 16 BRPM | TEMPERATURE: 98.3 F

## 2023-08-28 PROCEDURE — 250N000013 HC RX MED GY IP 250 OP 250 PS 637: Performed by: STUDENT IN AN ORGANIZED HEALTH CARE EDUCATION/TRAINING PROGRAM

## 2023-08-28 RX ORDER — PANTOPRAZOLE SODIUM 40 MG/1
40 TABLET, DELAYED RELEASE ORAL
Qty: 30 TABLET | Refills: 0 | Status: SHIPPED | OUTPATIENT
Start: 2023-08-29 | End: 2023-09-27

## 2023-08-28 RX ORDER — ACETAMINOPHEN 325 MG/10.15ML
650 LIQUID ORAL EVERY 4 HOURS PRN
Qty: 473 ML | Refills: 0 | Status: SHIPPED | OUTPATIENT
Start: 2023-08-28 | End: 2023-09-27

## 2023-08-28 RX ORDER — LEVOTHYROXINE SODIUM 112 UG/1
112 TABLET ORAL
Qty: 30 TABLET | Refills: 0 | Status: SHIPPED | OUTPATIENT
Start: 2023-08-29 | End: 2023-09-27

## 2023-08-28 RX ORDER — POLYETHYLENE GLYCOL 3350 17 G/17G
17 POWDER, FOR SOLUTION ORAL DAILY
Qty: 510 G | Refills: 0 | Status: SHIPPED | OUTPATIENT
Start: 2023-08-28 | End: 2023-09-27

## 2023-08-28 RX ORDER — AMOXICILLIN 250 MG
1 CAPSULE ORAL 2 TIMES DAILY
Qty: 30 TABLET | Refills: 0 | Status: SHIPPED | OUTPATIENT
Start: 2023-08-28 | End: 2023-09-27

## 2023-08-28 RX ORDER — MINERAL OIL/HYDROPHIL PETROLAT
OINTMENT (GRAM) TOPICAL EVERY 8 HOURS
Qty: 50 G | Refills: 0 | Status: SHIPPED | OUTPATIENT
Start: 2023-08-28 | End: 2023-09-27

## 2023-08-28 RX ADMIN — WHITE PETROLATUM: 1.75 OINTMENT TOPICAL at 07:56

## 2023-08-28 RX ADMIN — LEVOTHYROXINE SODIUM 112 MCG: 0.11 TABLET ORAL at 07:55

## 2023-08-28 ASSESSMENT — ACTIVITIES OF DAILY LIVING (ADL)
ADLS_ACUITY_SCORE: 27

## 2023-08-28 NOTE — PROGRESS NOTES
Calorie Count  Intake recorded for: 8/27  Total Kcals: 215 Total Protein: 10g  Kcals from Hospital Food: 0   Protein: 0g  Kcals from Outside Food (average):215 Protein: 10g  # Meals Ordered from Kitchen: food from outside the hospital only  # Meals Recorded: 100% 1 cup rice porridge from home  # Supplements Recorded: 0

## 2023-08-28 NOTE — PROGRESS NOTES
Discharge time/date: 8/28, 1330  Walked or Wheelchair: Wheelchair  PIV removed: Yes  Reviewed AVS with patient: Yes  Medication due times added to AVS in EPIC: N/A  Verbalized understanding of discharge with teachback: Yes  Medications retrieved from pharmacy: Yes  Supplies sent home: Yes  Belongings from security with patient: N/A

## 2023-08-28 NOTE — DISCHARGE SUMMARY
Discharge Summary  Leonid Mcmillan  [id number]  1985    Date of Admission: 8/24/2023  Date of Discharge: 8/28/2023    Admission Diagnosis: Malignant neoplasm of thyroid gland (H) [C73]  Encounter for postoperative care [Z48.89]  Discharge Diagnosis: Same    Procedures:  Date:   Procedure(s):  total thyroidectomy  direct laryngoscopy, flexible Bronchoscopy  Esophagoscopy  tracheal resection    Pathology: pending    HPI: Leonid Mcmillan is a 38 year old otherwise healthy male who developed a large thyroid nodule that was FNA proven papillary thyroid cancer with tracheal invasion and intraluminal disease based on imaging. It was recommended that he undergo operative intervention and the patient consented to the above procedure after detailed explanation of the risks and benefits of said procedure.    Hospital Course: The patient was admitted to the hospital and underwent the above mentioned procedure. He tolerated the procedure without any intra- or alfred-operative complications. Please see the operative report for full details of the procedure. Of note there was significant tracheal invasion and invasion of the recurrent laryngeal nerve on the right and it was sacrificed. A calixto stitch to maintain head neutrality and one DENA drain was placed for drainage and for monitoring for tracheal anastomotic leak. His post op PTH and calcium were normal. The patient was admitted for post-operative monitoring. He initially had significant dysphagia with concern for aspiration base onf evaluation by SLP and required continued NPO status on POD1. He was cleared for a soft diet/thin liquids on POD2 and tolerated it well thereafter. His DENA drain remained in place, held suction, and had minimal output and no sign of leak. At discharge, the patient's pain was well controlled, the patient was voiding on his own, he was ambulating and tolerating his diet. The patient was taught how to care for his     Discharge Exam:  Vitals:    08/27/23 1700  08/27/23 2325 08/28/23 0830 08/28/23 1224   BP: 120/87 (!) 127/98 (!) 121/93 120/86   BP Location: Left arm Left arm Right arm Left arm   Pulse: 66 57 70 64   Resp: 18 18 18 16   Temp: 98.9  F (37.2  C) 98.9  F (37.2  C) 98.3  F (36.8  C) 98.3  F (36.8  C)   TempSrc: Oral Oral Oral Oral   SpO2: 99% 98% 97% 99%   Weight:       Height:            General: Alert and oriented, No acute distress. Voice hoarse and soft.                Head: Atraumatic, Normocephalic               Eyes: EOMI, sclera white               Face: Symmetrical at rest (HB 1/6).                Neck: Transverse neck incision c/d/I. Deshawn stitch in place. DENA drain holding bulb suction with serosanguinous output. Trachea midline. No crepitus.               Pulmonary: Breathing non-labored, no stridor, no accessory muscle use on room air.               Extremities: Warm and well perfused. No obvious deformity.    Discharge Medications:     Medication List        Started      acetaminophen 325 MG/10.15ML solution  Commonly known as: TYLENOL  650 mg, Oral, EVERY 4 HOURS PRN     levothyroxine 112 MCG tablet  Commonly known as: SYNTHROID/LEVOTHROID  112 mcg, Oral, EVERY MORNING BEFORE BREAKFAST  Start taking on: August 29, 2023     mineral oil-hydrophilic petrolatum external ointment  Topical, EVERY 8 HOURS, Apply to incision.     pantoprazole 40 MG EC tablet  Commonly known as: PROTONIX  40 mg, Oral, EVERY MORNING BEFORE BREAKFAST  Start taking on: August 29, 2023     polyethylene glycol 17 GM/Dose powder  Commonly known as: MIRALAX  17 g, Oral, DAILY     senna-docusate 8.6-50 MG tablet  Commonly known as: SENOKOT-S/PERICOLACE  1 tablet, Oral, 2 TIMES DAILY              Discharge Procedure Orders   Reason for your hospital stay   Order Comments: Post-operative care     Activity   Order Comments: Your activity upon discharge: No heavy lifting greater than 10 lbs and no strenuous exercise for 2 weeks or until follow up appointment. No driving while taking  "narcotic pain medications.     Order Specific Question Answer Comments   Is discharge order? Yes      Adult [facility] Follow-up and recommended labs and tests   Order Comments: Follow up in ENT clinic with Dr. Morales on Thursday, 8/31/23 at 11:00AM in the main hospital building, 6th floor ENT exam room (1st door on the left on unit [address]). Someone will meet you in the hospital lobby to bring you up to the room. Please call the clinic with questions/concerns: [phone].    Otolaryngology/ENT Clinic:  [facility]  [address] [place] [address]      Appointments on [place] and/or [facility] (with [facility] or [facility] provider or service). Call [phone] if you haven't heard regarding these appointments within 7 days of discharge.     When to contact your care team   Order Comments: Notify your doctor if your drain stops holding suction.    Please notify your doctor if you experience wound breakdown, sustained bleeding from the wound site, or increasing redness, swelling, and/or purulent malorodorous discharge from the wound site which may indicate infection. If you feel it is acute, or experience sudden changes in breathing, chest pain, or excessive sleepiness/somnolence please return to the emergency department or call [phone]. If you have questions or concerns during the day please call ENT clinic and [phone]. If at night you can call [facility] at [phone] and ask for the \"ENT resident on call\".     Monitor and record   Order Comments: You are going home with surgical drains in place. Empty the drains and record output 3 times per day. Squeeze the bulb of the drain and replace cap.  If you have multiple drains, record the outputs separately. Once output is less than 30 mL in 24 hours, please contact the ENT clinic to schedule an appointment for drain removal.     Wound care and dressings   Order Comments: Instructions to care for your " wound at home: Keep chin stitch in place until follow up. Keep incisions clean and dry. Apply Aquaphor ointment to incisions three times daily to keep moist. You may shower, do not soak, scrub, or submerge incisions under water. If you have a surgical drain, do not get the drain site wet until 24 hours after the drain has been removed.     Diet   Order Comments: Follow this diet upon discharge: Soft and bite sized diet with thin liquids     Order Specific Question Answer Comments   Is discharge order? Yes        Dispo: To home in good condition. All of the patient's questions/concerns have been addressed at this time.     Mendoza Issa MD   Department of Otolaryngology - Head and Neck Surgery, PGY 1  Please page ENT with questions

## 2023-08-28 NOTE — PLAN OF CARE
D AVSS with sat's 95% on room air. Heart regular and lungs clear. Voiced no c/o pain or nausea overnight and slept well between cares. Took a shower with wife's help last evening and Aquaphor applied to sutures post shower. Scan output from DENA and noted no surrounding redness.  I Vital's, assessment and med's per order.  A Resting in bed with call light in reach.  P Continue to monitor and update MD with changes.

## 2023-08-29 ENCOUNTER — DOCUMENTATION ONLY (OUTPATIENT)
Dept: OTHER | Facility: CLINIC | Age: 38
End: 2023-08-29
Payer: COMMERCIAL

## 2023-08-29 NOTE — PLAN OF CARE
Speech Language Therapy Discharge Summary    Reason for therapy discharge:    Discharged to home with outpatient therapy.    Progress towards therapy goal(s). See goals on Care Plan in [facility] electronic health record for goal details.  Goals not met.  Barriers to achieving goals:   discharge from facility.    Therapy recommendation(s):    Continued therapy is recommended.  Rationale/Recommendations:  Recommend the pt continue soft and bite-sized diet and thin liquids. Pt should be upright and alert for all PO, taking small bites/sips at slow rate. Recommend ongoing SLP services for dysphagia and monitor voice.

## 2023-09-07 ENCOUNTER — MYC MEDICAL ADVICE (OUTPATIENT)
Dept: FAMILY MEDICINE | Facility: CLINIC | Age: 38
End: 2023-09-07
Payer: COMMERCIAL

## 2023-09-07 ENCOUNTER — TELEPHONE (OUTPATIENT)
Dept: FAMILY MEDICINE | Facility: CLINIC | Age: 38
End: 2023-09-07
Payer: COMMERCIAL

## 2023-09-07 NOTE — TELEPHONE ENCOUNTER
"Patient's wife calling, patient had thyroid surgery 2 weeks ago. total thyroidectomy     Date of Admission: 8/24/2023  Date of Discharge: 8/28/2023    Was prescribed Levothyroxine 112 mcg    This is make the patient have racing heart, feeling like his heart is skipping beats.    Patient now skipped taking this morning. This evening he cut in half.     Is wanting to not take or change dosage.    RN tried to explain reason he needs medication and reason to take in AM on empty stomach.    RN also recommended calling provider that prescribed medication.     No TSH lab for recent dosage? Notes say His post op PTH and calcium were normal.     Patient wife says that the provider for follow up is very hard to get ahold of so they are reaching out to PCP.    RN gave them recommendation to call and have Resident on call paged. But sending high priority message to PCP.    Dr. Morales    If you have questions or concerns during the day please call ENT clinic and [phone]. If at night you can call [facility] at [phone] and ask for the \"ENT resident on call\".     SANJAY Harden  [facility]   "

## 2023-09-08 ENCOUNTER — PATIENT OUTREACH (OUTPATIENT)
Dept: OTOLARYNGOLOGY | Facility: CLINIC | Age: 38
End: 2023-09-08
Payer: COMMERCIAL

## 2023-09-11 ENCOUNTER — OFFICE VISIT (OUTPATIENT)
Dept: OTOLARYNGOLOGY | Facility: CLINIC | Age: 38
End: 2023-09-11
Payer: COMMERCIAL

## 2023-09-11 ENCOUNTER — THERAPY VISIT (OUTPATIENT)
Dept: SPEECH THERAPY | Facility: CLINIC | Age: 38
End: 2023-09-11
Payer: COMMERCIAL

## 2023-09-11 VITALS
BODY MASS INDEX: 26.29 KG/M2 | WEIGHT: 154 LBS | DIASTOLIC BLOOD PRESSURE: 79 MMHG | SYSTOLIC BLOOD PRESSURE: 121 MMHG | HEIGHT: 64 IN | HEART RATE: 76 BPM

## 2023-09-11 DIAGNOSIS — C73 THYROID CANCER (H): Primary | ICD-10-CM

## 2023-09-11 DIAGNOSIS — C73 MALIGNANT NEOPLASM OF THYROID GLAND (H): Primary | ICD-10-CM

## 2023-09-11 LAB
PATH REPORT.COMMENTS IMP SPEC: ABNORMAL
PATH REPORT.COMMENTS IMP SPEC: YES
PATH REPORT.FINAL DX SPEC: ABNORMAL
PATH REPORT.GROSS SPEC: ABNORMAL
PATH REPORT.INTRAOP OBS SPEC DOC: ABNORMAL
PATH REPORT.MICROSCOPIC SPEC OTHER STN: ABNORMAL
PATH REPORT.RELEVANT HX SPEC: ABNORMAL
PATHOLOGY SYNOPTIC REPORT: ABNORMAL
PHOTO IMAGE: ABNORMAL

## 2023-09-11 PROCEDURE — 99024 POSTOP FOLLOW-UP VISIT: CPT | Performed by: STUDENT IN AN ORGANIZED HEALTH CARE EDUCATION/TRAINING PROGRAM

## 2023-09-11 PROCEDURE — 92610 EVALUATE SWALLOWING FUNCTION: CPT | Mod: GN | Performed by: SPEECH-LANGUAGE PATHOLOGIST

## 2023-09-11 ASSESSMENT — PAIN SCALES - GENERAL: PAINLEVEL: NO PAIN (0)

## 2023-09-11 NOTE — LETTER
9/11/2023       RE: Leonid Mcmillan  [address] [place] [address]     Dear Colleague,    Thank you for referring your patient, Leonid Mcmillan, to the [facility] [place] [facility]. Please see a copy of my visit note below.    Head and Neck Surgery  9/11/2023    Diagnosis: T4aN1a classic papillary thyroid cancer right lobe (transmural tracheal invasion)    Treatment: Total thyroidectomy, sacrifice of the right recurrent laryngeal nerve, tracheal resection 8/24/23    Post op PTH normal    Pathology:  5.1 cm classic PTC  Unencapsulated  Extends throguh trachea cartilage, involves right recurrent laryngeal nerve  +LVI, +PNI  Margins negative  One perithyroid lymph node with PTC  BRAF V600E +    Interval history:  He has no complaints today.  He has had some intermittent palpitations.  He is tolerating an oral diet.  No hypocalcemia symptoms.  No respiratory complaints.    Physical examination:  Alert and in no acute distress  Breathy voice  Incision is healing well without signs of fluid collection, infection, or crepitus  Left neck drain with minimal drainage.  This was removed today    Procedure:  Fiberoptic laryngoscopy was performed.  The scope advanced into the nasal cavity.  The right vocal cord is paralyzed to the left vocal and has good motion.  The anterior aspect of the tracheal repair appears to be healing well.    Assessment and plan:  Mr. Montes is recovering well from his surgery.  I reviewed the pathology results with him and his family.  He will need adjuvant radioactive iodine.  I am not sure of the role of external beam radiation therapy in this situation.  I will discuss this with my colleagues and also discuss this at our multidisciplinary head and neck conference.    I will refer him to Dr. Tessy Dale to discuss options for his right vocal cord paralysis    He will be referred to endocrinology for consideration of radioactive  iodine, long-term follow-up.    If he has continued palpitations he should be seen by his primary care or the emergency department    Antonio Morales MD    30 minutes spent on the date of the encounter in chart review, patient visit, review of tests, documentation and/or discussion with other providers about the issues documented above asides from time spent doing flexible laryngoscopy.           Again, thank you for allowing me to participate in the care of your patient.      Sincerely,    Antonio Morales MD

## 2023-09-11 NOTE — PROGRESS NOTES
SPEECH LANGUAGE PATHOLOGY EVALUATION    See electronic medical record for Abuse and Falls Screening details.    Subjective      Presenting condition or subjective complaint:   Pt had trouble swallowing following surgery. He presents today for follow up visit with ENT. Dr. Morales requested clinical swallow evaluation today.   Date of onset: 08/24/23    Relevant medical history:   Pt underwent total thyroidectomy, direct laryngoscopy, esophagoscoy and tracheal resection on 8/24/23. His right recurrent laryngeal nerve was encased in tumor and thus sacrificed during surgery. He has been eating and drinking but has some coughing episodes.     Prior diagnostic imaging/testing results:     Clinical swallow evaluation during hospitalization following surgery.  Prior therapy history for the same diagnosis, illness or injury:    Brief SLP services during inpatient stay. He demonstrated progress to regular solids and thin liquids.    Patient goals for therapy:  Improve swallow function    Pain assessment: Pain denied     Objective     SWALLOW EVALUTION  Dysphagia history: Pt denies trouble swallowing prior to surgery. He notes coughing on liquids, especially water, following surgery. He does ok if he takes small sips.   Current Diet/Method of Nutritional Intake: thin liquids (level 0), regular diet        CLINICAL SWALLOW EVALUATION  Oral Motor Function: Dentition: natural dentition  Secretion management: WFL  Mucosal quality: good  Mandibular function: intact  Oral labial function: WFL  Lingual function: WFL  Velar function: intact   Buccal function: intact  Laryngeal function: dysphonia, decreased cough strength, vocal fold paralysis following surgery      Level of assist required for feeding: no assistance needed   Textures Trialed:   Clinical Swallow Eval: Thin Liquids  Mode of presentation: cup, self-fed   Volume presented: 6 oz water by cup  Preparatory Phase: WFL  Oral Phase: WFL  Pharyngeal phase of swallow:  coughing/choking, repeated swallows, throat clearing   Strategies trialed during procedure: DANNY SLP CLINICAL EVAL STRATEGIES: small sips    Diagnostic statement: Pt demonstrates intermittent throat clearing and a single episode of coughing noted after thin liquid trial. He demonstrates multiple swallows on all trials indicating probable pharyngeal residue. Encouraged small sips and slow rate.     Clinical Swallow Eval: Purees  Mode of presentation: spoon   Volume presented: 2 oz apple sauce  Preparatory Phase: WFL  Oral Phase: WFL  Pharyngeal phase of swallow: impaired, feeling of something stuck in throat, repeated swallows   Strategies trialed during procedure: DANNY SLP CLINICAL EVAL STRATEGIES: hard swallow, sips of liquid to clear    Diagnostic statement: No overt clinical s/sx of aspiration/penetration noted on puree trials. Pt demonstrates multiple swallows on each trial. He reports feeling some residue after the completed swallow.      ESOPHAGEAL PHASE OF SWALLOW  no observed or reported concerns related to esophageal function     SWALLOW ASSESSMENT CLINICAL IMPRESSIONS AND RATIONALE  Diet Consistency Recommendations: thin liquids (level 0), regular diet    Recommended Feeding/Eating Techniques: slow rate of intake, effortful (hard) swallow, maintain upright sitting position for eating, maintain upright posture during/after eating for 30 minutes   Medication Administration Recommendations: whole in puree   Instrumental Assessment Recommendations: FEES (fiberoptic endoscopic evaluation of swallowing), VFSS (videofluoroscopic swallowing study), to further assess pharyngeal function and airway safety.      Assessment & Plan   CLINICAL IMPRESSIONS   Medical Diagnosis: Thyroid cancer    Treatment Diagnosis: oropharyngeal dysphagia   Impression/Assessment: Pt is a 38 year old male with dysphagia complaints. The following significant findings have been identified: impaired swallowing, characterized by intermittent  s/sx of aspiration/penetration on thin liquid trials and multiple swallows needed on all swallows. Identified deficits interfere with their ability to consume an oral diet as compared to previous level of function.    PLAN OF CARE  Treatment Interventions: Swallowing dysfunction and/or oral function for feeding    Prognosis to achieve stated therapy goals is good   Rehab potential is impacted by: comorbidities, current level of function    Long Term Goals   SLP Goal 1  Goal Identifier: PO  Goal Description: Pt will tolerate least restrictive diet with independent use of safe swallow strategies 100% of the time.  Rationale: To maximize safety, ease and/or independence of oral intake  Target Date: 12/09/23      Frequency of Treatment: 2-3 times per month  Duration of Treatment: 3 months     Recommended Referrals to Other Professionals: none  Education Assessment:   Learner/Method: Patient;Family    Risks and benefits of evaluation/treatment have been explained.   Patient/Family/caregiver agrees with Plan of Care.     Evaluation Time:    SLP Eval: oral/pharyngeal swallow function, clinical minutes (77358): 10        Signing Clinician: TORRES Haddad    [facility]                                                                                   OUTPATIENT SPEECH LANGUAGE PATHOLOGY      PLAN OF TREATMENT FOR OUTPATIENT REHABILITATION   Patient's Last Name, First Name, Leonid Bowens    YOB: 1985   Provider's Name   [facility]   Medical Record No.  [id number]     Onset Date: 08/24/23 Start of Care Date: 09/11/23     Medical Diagnosis:  Thyroid cancer      SLP Treatment Diagnosis: oropharyngeal dysphagia  Plan of Treatment  Frequency/Duration: 2-3 times per month  / 3 months     Certification date from 09/11/23   To 12/09/23          See note for plan of treatment details and functional goals     TORRES Haddad                          I CERTIFY THE NEED FOR THESE SERVICES FURNISHED UNDER        THIS PLAN OF TREATMENT AND WHILE UNDER MY CARE     (Physician attestation of this document indicates review and certification of the therapy plan).                Referring Provider:  Dr. Antonio Morales      Initial Assessment  See Epic Evaluation- 09/11/23

## 2023-09-11 NOTE — NURSING NOTE
"Chief Complaint   Patient presents with    Consult     2 WEEKS POST OP       Blood pressure 121/79, pulse 76, height 1.626 m (5' 4\"), weight 69.9 kg (154 lb).    Cecilia Milligan, EMT    "

## 2023-09-12 NOTE — PROGRESS NOTES
Head and Neck Surgery  9/11/2023    Diagnosis: T4aN1a classic papillary thyroid cancer right lobe (transmural tracheal invasion)    Treatment: Total thyroidectomy, sacrifice of the right recurrent laryngeal nerve, tracheal resection 8/24/23    Post op PTH normal    Pathology:  5.1 cm classic PTC  Unencapsulated  Extends throguh trachea cartilage, involves right recurrent laryngeal nerve  +LVI, +PNI  Margins negative  One perithyroid lymph node with PTC  BRAF V600E +    Interval history:  He has no complaints today.  He has had some intermittent palpitations.  He is tolerating an oral diet.  No hypocalcemia symptoms.  No respiratory complaints.    Physical examination:  Alert and in no acute distress  Breathy voice  Incision is healing well without signs of fluid collection, infection, or crepitus  Left neck drain with minimal drainage.  This was removed today    Procedure:  Fiberoptic laryngoscopy was performed.  The scope advanced into the nasal cavity.  The right vocal cord is paralyzed to the left vocal and has good motion.  The anterior aspect of the tracheal repair appears to be healing well.    Assessment and plan:  Mr. Montes is recovering well from his surgery.  I reviewed the pathology results with him and his family.  He will need adjuvant radioactive iodine.  I am not sure of the role of external beam radiation therapy in this situation.  I will discuss this with my colleagues and also discuss this at our multidisciplinary head and neck conference.    I will refer him to Dr. Tessy Dale to discuss options for his right vocal cord paralysis    He will be referred to endocrinology for consideration of radioactive iodine, long-term follow-up.    If he has continued palpitations he should be seen by his primary care or the emergency department    Antonio Morales MD    30 minutes spent on the date of the encounter in chart review, patient visit, review of tests, documentation and/or discussion with other  providers about the issues documented above asides from time spent doing flexible laryngoscopy.

## 2023-09-13 NOTE — TUMOR CONFERENCE
Head & Neck Tumor Conference Note   9/15/2023    Status: Established  Staff: Dr. Morales    Tumor Site: Right thyroid lobe  Tumor Pathology:  classic papillary thyroid cancer  Tumor Stage: iT1oC9k (transmural tracheal invasion)  Tumor Treatment:   Total thyroidectomy, sacrifice of the right recurrent laryngeal nerve, tracheal resection 8/24/23    Reason for Review: Review imaging, path, and POC    Brief History: This is a 38 year old male with a history of a right thyroid nodule with CT imaging showing a 5 x 4 cm right thyroid nodule with some mild tracheal deviation. He underwent total thyroidectomy, with sacrifice of the right recurrent laryngeal nerve, tracheal resection on 8/24/23. He is recovering well postop and today we are reviewing his pathology.     Pertinent PMH:   Past Medical History:   Diagnosis Date    Thyroid cancer (H)       Smoking Hx:   Social History     Tobacco Use    Smoking status: Never    Smokeless tobacco: Never   Substance Use Topics    Alcohol use: Never    Drug use: Never     Imaging:   None.    Pathology:   A. EVALUATE FOR RIGHT INFERIOR PARATHYROID, BIOPSY:  -Benign parathyroid tissue.     B. THYROID GLAND, TOTAL THYROIDECTOMY WITH TRACHEAL RESECTION:  -PAPILLARY THYROID CARCINOMA, classic/conventional type, 5.1 cm in greatest dimension.  -Tumor entirely replaces right thyroid lobe, it is unencapsulated, extends through tracheal cartilage, and involves right recurrent laryngeal nerve, tracheal submucosa and skeletal muscle.  -Angiolymphatic and perineural invasion are present.  -Margins are negative, including superior and inferior tracheal margins; closest uninvolved margin is anterior right lobe at 1 mm.  -BRAF V600E immunohistochemistry test is POSITIVE.  -One perithyroid lymph node focally involved by metastatic carcinoma, less than 1 mm (1/1).  -Isthmus and left thyroid lobe are negative for malignancy.  -AJCC pathologic staging is pT4a N1a.  -See synoptic report.     C. INFERIOR  TRACHEAL WALL, EXCISION:  -Tracheal wall, negative for malignancy.      Tumor Board Recommendation:   Discussion: In this 38-year-old with invasive papillary thyroid carcinoma who is s/p tracheal resection, total thyroidectomy, pathology and POC were reviewed. Pathology demonstrates invasive papillary thyroid carcinoma (classic type) with extent through the tracheal cartilage, with right recurrent nerve involvement. There is angiolymphatic invasion and PNI present. Margins are negative and closest is anterior right lobe (1mm), positive for BRAF V600E, with 1 positive lymph node (perithyroidal w/in specimen).     Plan:   - adjuvant radioactive iodine    Scarlett Potts MD   Otolaryngology-Head & Neck Surgery PGY3  Please contact ENT on call with questions    Documentation / Disclaimer Cancer Tumor Board Note  Cancer tumor board recommendations do not override what is determined to be reasonable care and treatment, which is dependent on the circumstances of a patient's case; the patient's medical, social, and personal concerns; and the clinical judgment of the oncologist [physician].

## 2023-09-14 NOTE — TELEPHONE ENCOUNTER
FUTURE VISIT INFORMATION      FUTURE VISIT INFORMATION:  Date: 10/26/23  Time: 8am  [place] [facility] [place]FORMATION:  Referring provider:  Dr. Morales  Referring providers clinic:   ENT  Reason for visit/diagnosis  discussion of options for right vocal cord paralysis- Referred by Dr. Morales  Scope, fees, voice and swallow slp. SEGUN DOOLEY     RECORDS REQUESTED FROM:       Clinic name Comments Records Status Imaging Status    ENT 9/11/23- Ov w. Dr. Morales   8/24/23- Admission and Neck Surgery Epic     [facility] 8/11/23- OV w. Fabiola Kent, APRN CNP   2/9/21- ov w. Lisa Jha C, FNP   Epic     Imaging  6/26/23- ct chest   6/6/23- ct neck, us head neck   5/25/23- us biopsy thyroid   5/19/23- us thyroid   3/3/23- ct chest, xr chest  [facility]  Pacs

## 2023-09-15 ENCOUNTER — TUMOR CONFERENCE (OUTPATIENT)
Dept: ONCOLOGY | Facility: CLINIC | Age: 38
End: 2023-09-15
Payer: COMMERCIAL

## 2023-09-15 NOTE — TUMOR CONFERENCE
Called and spoke with patient's wife regarding surgical pathology results as well as tumor board recommendations.  Reviewed with wife that recommendation is to just proceed with radioactive iodine with endocrinology and nothing additional at this time. Wife verbalized understanding. Advised that endocrinology should be in touch to arrange consult soon. Advised wife to call if she has not heard from endocrine scheduling by mid next week. She is agreeable.     Wife encouraged to call with further questions or concerns.       Yue James, RN, BSN

## 2023-09-25 ENCOUNTER — APPOINTMENT (OUTPATIENT)
Dept: INTERPRETER SERVICES | Facility: CLINIC | Age: 38
End: 2023-09-25
Payer: COMMERCIAL

## 2023-09-25 ENCOUNTER — TELEPHONE (OUTPATIENT)
Dept: ENDOCRINOLOGY | Facility: CLINIC | Age: 38
End: 2023-09-25
Payer: COMMERCIAL

## 2023-09-25 DIAGNOSIS — C73 THYROID CANCER (H): ICD-10-CM

## 2023-09-25 DIAGNOSIS — E89.0 POSTOPERATIVE HYPOTHYROIDISM: ICD-10-CM

## 2023-09-25 DIAGNOSIS — C73 PAPILLARY THYROID CARCINOMA (H): ICD-10-CM

## 2023-09-25 RX ORDER — LEVOTHYROXINE SODIUM 112 UG/1
112 TABLET ORAL
Qty: 30 TABLET | Refills: 0 | Status: CANCELLED | OUTPATIENT
Start: 2023-09-25

## 2023-09-25 NOTE — TELEPHONE ENCOUNTER
DX, Referring NOTES: Malignant Neoplasm of Thyroid Gland; referred by Dr. Antonio Morales    For Cancer Patients: Need the original operative and surgical pathology reports and all imaging reports/images related to the disease (includes all thyroid US, nuclear thyroid and total body scans, PET scans, chest CT reports since prior to the diagnosis ).   APPT DATE: 9/27/2023   NOTES (FOR ALL VISITS) STATUS DETAILS   OFFICE NOTES from referring provider [facility]:  9/11/23, 7/24/23 - ENT OV with Dr. Morales   OFFICE NOTES from other specialist [facility]:  9/11/23 - SPEECH OV with TORRES Haddad  7/11/23 - SURG OV with Dr. Elio Whitfield [facility]:  8/11/23 - PCC OV with Fabiola Kent NP  4/14/23 - EENT OV with Dr. Ac  3/3/23 - PCC OV with EARNEST Rachel  2/9/21 - PCC OV with Lisa Jha NP  4/6/17 - PCC OV with Dr. Nahid SHETTY NOTES [facility]:  8/24/23 - Admission with Dr. Andrew Whitfield [facility]:  3/3/23 - ED OV with Dr. Dupree   OPERATIVE REPORT  (thyroid, pituitary, adrenal, parathyroid)  (All op notes related to diagnoses) [facility]:  8/24/23 - OP Note for TOTAL THYROIDECTOMY with Dr. Morales  7/13/23 - OP Note for FLEXIBLE BRONCHOSCOPY, DIRECT LARYNGOSCOPY, EGD with Dr. Morales   MEDICATION LIST Internal    IMAGING      XR (Chest) [facility]:  3/3/23 - XR Chest   CT (HEAD/NECK/CHEST/ABDOMEN) [facility]:  6/26/23 - CT Chest  6/6/23 - CT neck  3/3/23 - CT Chest   ULTRASOUND (HEAD/NECK)  * Include FNAs [facility]:  6/6/23 - US Head/Neck  5/25/23 - US Thyroid FNA  4/6/17 - US Thyroid   LABS     DIABETES: HBGA1C, CREATININE, FASTING LIPIDS, MICROALBUMIN URINE, POTASSIUM, TSH, T4    THYROID: TSH, T4, CBC, THYRODLONULIN, TOTAL T3, FREE T4, CALCITONIN, CEA [facility]:  8/27/23 - BMP  8/27/23 - CBC  8/26/23 - Glucose  8/25/23 - Magnesium  8/25/23 - Phosphorus  8/24/23 - Calcium  8/24/23 - Parathyroid Hormone  8/11/23 - Lipid  3/3/23 - CMP  3/3/23 -  TSH, T4   PATHOLOGY REPORTS WITH CASE NUMBER  *Surgical path reports for endocrine organs (ovaries, testes, pancreas, etc) [facility]:  8/24/23 - Thyroid Biopsy (Case: [id number])  7/13/23 - Pyriform Sinus Biopsy (Case: [id number])  5/25/23 - Thyroid FNA (Case: [id number])

## 2023-09-25 NOTE — TELEPHONE ENCOUNTER
Medication Question or Refill        What medication are you calling about (include dose and sig)?: levothyrozine Sodium 112mcg    Preferred Pharmacy: [id number] - [place], [place] - [address] & [facility]  [address] [place] [address]  Phone: [phone] Fax: [phone]      Controlled Substance Agreement on file:   CSA -- Patient Level:    CSA: None found at the patient level.       Who prescribed the medication?: from thyroid surgeon  Dr/. Antonio Morales    Do you need a refill? Yes 3 doses left     When did you use the medication last? 9/25/23    Patient offered an appointment? Yes: pt is unsure     Do you have any questions or concerns?  No      Could we send this information to you in [facility] or would you prefer to receive a phone call?:   Patient would prefer a phone call   Okay to leave a detailed message?: Yes at Home number on file [phone] (home)

## 2023-09-27 ENCOUNTER — LAB (OUTPATIENT)
Dept: LAB | Facility: CLINIC | Age: 38
End: 2023-09-27
Payer: COMMERCIAL

## 2023-09-27 ENCOUNTER — PRE VISIT (OUTPATIENT)
Dept: ENDOCRINOLOGY | Facility: CLINIC | Age: 38
End: 2023-09-27

## 2023-09-27 ENCOUNTER — OFFICE VISIT (OUTPATIENT)
Dept: ENDOCRINOLOGY | Facility: CLINIC | Age: 38
End: 2023-09-27
Attending: STUDENT IN AN ORGANIZED HEALTH CARE EDUCATION/TRAINING PROGRAM
Payer: COMMERCIAL

## 2023-09-27 VITALS
BODY MASS INDEX: 26.91 KG/M2 | HEART RATE: 69 BPM | SYSTOLIC BLOOD PRESSURE: 132 MMHG | WEIGHT: 156.8 LBS | DIASTOLIC BLOOD PRESSURE: 92 MMHG | OXYGEN SATURATION: 98 %

## 2023-09-27 DIAGNOSIS — J38.01 VOCAL FOLD PARALYSIS, RIGHT: ICD-10-CM

## 2023-09-27 DIAGNOSIS — E89.0 POSTOPERATIVE HYPOTHYROIDISM: ICD-10-CM

## 2023-09-27 DIAGNOSIS — Z77.098 EXPOSURE TO IODINE: ICD-10-CM

## 2023-09-27 DIAGNOSIS — C73 PAPILLARY THYROID CARCINOMA (H): ICD-10-CM

## 2023-09-27 DIAGNOSIS — C73 PAPILLARY THYROID CARCINOMA (H): Primary | ICD-10-CM

## 2023-09-27 LAB
CALCIUM SERPL-MCNC: 9.4 MG/DL (ref 8.6–10)
Lab: NORMAL
PERFORMING LABORATORY: NORMAL
PHOSPHATE SERPL-MCNC: 4.3 MG/DL (ref 2.5–4.5)
SPECIMEN STATUS: NORMAL
T4 FREE SERPL-MCNC: 1.39 NG/DL (ref 0.9–1.7)
TEST NAME: NORMAL
TSH SERPL DL<=0.005 MIU/L-ACNC: 11.87 UIU/ML (ref 0.3–4.2)

## 2023-09-27 PROCEDURE — 36415 COLL VENOUS BLD VENIPUNCTURE: CPT | Performed by: PATHOLOGY

## 2023-09-27 PROCEDURE — 83789 MASS SPECTROMETRY QUAL/QUAN: CPT | Performed by: PATHOLOGY

## 2023-09-27 PROCEDURE — 99417 PROLNG OP E/M EACH 15 MIN: CPT

## 2023-09-27 PROCEDURE — 84100 ASSAY OF PHOSPHORUS: CPT | Performed by: PATHOLOGY

## 2023-09-27 PROCEDURE — 99205 OFFICE O/P NEW HI 60 MIN: CPT

## 2023-09-27 PROCEDURE — 82310 ASSAY OF CALCIUM: CPT | Performed by: PATHOLOGY

## 2023-09-27 PROCEDURE — 86800 THYROGLOBULIN ANTIBODY: CPT

## 2023-09-27 PROCEDURE — 82570 ASSAY OF URINE CREATININE: CPT | Performed by: PATHOLOGY

## 2023-09-27 PROCEDURE — 84439 ASSAY OF FREE THYROXINE: CPT | Performed by: PATHOLOGY

## 2023-09-27 PROCEDURE — 84443 ASSAY THYROID STIM HORMONE: CPT | Performed by: PATHOLOGY

## 2023-09-27 RX ORDER — LEVOTHYROXINE SODIUM 112 UG/1
TABLET ORAL
Qty: 90 TABLET | Refills: 4 | OUTPATIENT
Start: 2023-09-27

## 2023-09-27 RX ORDER — LEVOTHYROXINE SODIUM 112 UG/1
TABLET ORAL
Qty: 90 TABLET | Refills: 4 | Status: SHIPPED | OUTPATIENT
Start: 2023-09-27 | End: 2023-09-29

## 2023-09-27 RX ORDER — LEVOTHYROXINE SODIUM 112 UG/1
112 TABLET ORAL
Qty: 90 TABLET | Refills: 4 | Status: SHIPPED | OUTPATIENT
Start: 2023-09-27 | End: 2023-09-27

## 2023-09-27 ASSESSMENT — PAIN SCALES - GENERAL: PAINLEVEL: NO PAIN (0)

## 2023-09-27 NOTE — PROGRESS NOTES
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  DONTAE recurrent risk high   Recommend radioactive iodine after we have confirmed the iodine exposure has cleared.  We may need to work around his travel to [facility] in December.  Arrangements will need to take into account the 6 year old child at home.  Radioiodine has been shown to improve survival and decrease recurrence in individuals with pT4 disease.   Labs today: Tg, TSH, free T4, Ca, spot urine iodine   I have counseled today on the following:  Radioiodine treatment procedure, risks of treatment  Radiation safety precautions  Low iodine diet  Plans for future follow up    Addendum  9/27/23 Tg 1, DONTAE < 0.4, urine iodine 58 mcg/L   11/17/23 Tg pending, TSH 7.73, free t4 1.5    Post surgical hypothyroidism.  On LT4 112 mcg/day. Refilled at present dose.  Treat to target TSH < 0.2 .  TFTS following appt show high TSH.  Increase LT4 to 112 *8/week (mean 128 mcg/day)  Repeat labs in 1-2 months     Iodinated contrast exposure 6/6/2023.  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   He has low volume voice and some symptoms.      I have independently reviewed and interpreted labs, imaging as indicated.  He was seen by me along with Dr Anthony Lozada, endocrine fellow.   _90_ minutes spent on the date of the encounter doing chart review, history and exam, documentation and further activities as noted above.      Leslie Galvez MD    Chief complaint:  Leonid is a 38 year old male seen in consultation at the  request of Dr Morales for thyroid cancer.   I have reviewed Care Everywhere including [facility] lab reports, imaging reports and provider notes as indicated.      HISTORY OF PRESENT ILLNESS    Leonid presents today along with his wife, who served as .  He first became aware of right thyroid mass in 2014, before he left [place].    Right thyroid mass was seem on Thyroid US in 2017, followed by recommendation for FNAB.  Recommendation for FNAB was again made in 2021.  He presented to the ED with hemoptysis 3/3/2023.  This was followed by CT neck and chest which showed the right mass. He was seen by Dr Ac (ENT) and then Dr Morales (ENT). 6/26 fiberoptic laryngoscopy by Dr Morales showed tumor emanating into the airway.  FNAB of the right thyroid mass was positive for papillary thyroid carcinoma.   Treatment of the thyroid cancer has been as follows:   8/24/23 total thyroidectomy, tracheal resection (3 rings removed), sacrifice of the RLN, reimplanation of right inferior parathyroid gland.  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  He was hospitalized 4 days  He is on LT4 112 mcg/, almost out of his current supply.  He has recovered from the surgery , with the exception of his voice.     Endocrine relevant labs are as follows:  4/6/17 TSH 0.89  3/3/23 TSH 1.07, Ca 8.7  8/24/23 PTH 31, Ca 8.5,   8/27/23 Ca 8.2, glucose 104   9/28/23 following appt, not discussed at appt Tg pending, TSH 11.87, free T4 1.39, Ca 9.4, phos 4.3,     Relevant imaging is as follows: (as read by me as it pertains to endocrine relevant organs)  3/3/2023 CT neck and chest: large right thyorid mass with tracheal deviation to the left ; some tracheal compression   5/19/2023 thyroid US copmared with 4/6/17    Right thyroid mass (5.9 x 4.2 x  expands right lobe - anterior borders suggestion suggestive of ETE ; heterogeneous; few echogenic foci; was 3.4 x 3.5 x 5.8 cm -- FNAB 5/25/23   Right inf # 1 1.4 x 1.1 x 1.6 cm - ? Extrathyroidal ; was   1.3 x 1 x 1.3   Left lobe smallish   6/6/23 neck US: no abnormal LNs  6/6/23 CT chest and neck with contrast - right thyroid mass very heerogeneous - extends posteriorly to vertebral body; tracheal deviation and some compression; irregular right tracheal border ? Shows site of invasion?     Childhod radiation exposure: none ; no medical xray     REVIEW OF SYSTEMS  Energy OK  Sleep OK  Voice is raspy; low volume ; voice is best in the AM  Some choking on water -   Cardiac: intermittent BP very low - down to 110/60; he can feel it pump and he has to catch breath.   Happens fast and then resolves.  It could last  hours.  It has been occurring   less frequently - last time was days ago  Respiratory: exercise tolerance reduced from before surgery -- can feel HR more with exertion  GI: negative; BM x 1/day; no N/V/abd pain   No muscle cramps  No paresthesias  No pains  No headaches  No dizziness   10 system ROS otherwise as per the HPI or negative    Past Medical History  Past Medical History:   Diagnosis Date    Infection due to 2019 novel coronavirus 02/2023    Papillary thyroid carcinoma (H) 08/24/2023    Postoperative hypothyroidism 08/24/2023    Thyroid mass 2014     Past Surgical History:   Procedure Laterality Date    BRONCHOSCOPY FLEXIBLE AND RIGID N/A 7/13/2023    Procedure: Flexible bronchoscopy, direct laryngoscopy;  Surgeon: Antonio Morales MD;  Location: [facility] OR    ESOPHAGOSCOPY N/A 7/13/2023    Procedure: ESOPHAGOSCOPY;  Surgeon: Antonio Morales MD;  Location: [facility]    ESOPHAGOSCOPY N/A 8/24/2023    Procedure: Esophagoscopy;  Surgeon: Antonio Morales MD;  Location: [facility] OR    LARYNGOSCOPY, BRONCHOSCOPY, COMBINED N/A 8/24/2023    Procedure: direct  laryngoscopy, flexible Bronchoscopy;  Surgeon: Antonio Morales MD;  Location: [facility] OR    RESECT TRACHEA WITH RECONSTRUCTION N/A 8/24/2023    Procedure: tracheal resection;  Surgeon: Pieter Ballard MD;  Location: [facility] OR    SHOULDER SURGERY Right 2005    shoulder injury, did not have general anesthesia    THYROIDECTOMY N/A 8/24/2023    Procedure: total thyroidectomy;  Surgeon: Antonio Morales MD;  Location:  OR     Medications  Current Outpatient Medications   Medication Sig Dispense Refill    levothyroxine (SYNTHROID/LEVOTHROID) 112 MCG tablet Take 1 tablet (112 mcg) by mouth every morning (before breakfast) 90 tablet 4     Allergies  No Known Allergies      Family History  Family History   Problem Relation Age of Onset    Diabetes Father     Anesthesia Reaction No family hx of     Venous thrombosis No family hx of     Myocardial Infarction No family hx of     Cerebrovascular Disease No family hx of     Thyroid Cancer No family hx of     Goiter No family hx of     Cancer No family hx of        Social History  Social History     Tobacco Use    Smoking status: Never    Smokeless tobacco: Never   Substance Use Topics    Alcohol use: Never    Drug use: Never     Came from [facility] 2014   6 year old daughter; ; currently at home, not working   ; traveling to [facility] in December.     Physical Exam  BP (!) 132/92   Pulse 69   Wt 71.1 kg (156 lb 12.8 oz)   SpO2 98%   BMI 26.91 kg/m    Body mass index is 26.91 kg/m .  GENERAL :  In no apparent distress; mask; low volume voice ; mask  SKIN: Normal color, normal temperature, texture.   EYES: PER, No scleral icterus,  No proptosis, conjunctival redness, stare, retraction  NECK: healed low transverse surgical scar.  No visible masses. No palpable adenopathy, or masses.  RESP: Lungs clear to auscultation bilaterally  CARDIAC: Regular rate and rhythm, normal S1 S2, without murmurs, rubs or gallops       NEURO: awake, alert, responds appropriately to questions.  Moves all extremities; Gait normal.  No tremor of the outstretched hand.  DTRs 1/4  EXTREMITIES: No clubbing, cyanosis or edema.    DATA REVIEW     Latest Ref Rng 9/27/2023  4:55 PM   [facility]     TSH 0.30 - 4.20 uIU/mL 11.87 (H)    FREE T4 0.90 - 1.70 ng/dL 1.39       Legend:  (H) High     Latest Ref Rng 8/11/2023  9:30 AM 8/24/2023  4:52 PM 8/25/2023  9:23 AM   [facility] CALCIUM [facility]       Albumin 3.5 - 5.2 g/dL  4.0     Alkaline Phosphatase 45 - 120 U/L      Calcium 8.6 - 10.0 mg/dL 9.6  8.5 (L)  8.5 (L)    Urea Nitrogen 8 - 22 mg/dL      Urea Nitrogen 6.0 - 20.0 mg/dL 16.5   11.2    Creatinine 0.67 - 1.17 mg/dL 0.85   0.80    Magnesium 1.7 - 2.3 mg/dL   2.1    Parathyroid Hormone Intact 15 - 65 pg/mL  31        Latest Ref Rng 8/27/2023  7:48 AM 9/27/2023  4:55 PM   [facility] CALCIUM [facility]      Albumin 3.5 - 5.2 g/dL     Alkaline Phosphatase 45 - 120 U/L     Calcium 8.6 - 10.0 mg/dL 8.2 (L)  9.4    Urea Nitrogen 8 - 22 mg/dL     Urea Nitrogen 6.0 - 20.0 mg/dL 6.4     Creatinine 0.67 - 1.17 mg/dL 0.70     Magnesium 1.7 - 2.3 mg/dL     Parathyroid Hormone Intact 15 - 65 pg/mL        Legend:  (L) Low  tains abnormal data Fine Needle Aspirate Thyroid, Right: [id number]  Order: [id number]  Collected 5/25/2023  9:27 AM    Status: Final result    Visible to patient: Yes (seen)    Dx: Thyroid nodule    0 Result Notes       Component Ref Range & Units    Final Diagnosis   Specimen A              Interpretation:              Positive for malignancy  Morphologically consistent with papillary thyroid carcinoma (see comment)   The [place] implied risk of malignancy and recommended clinical management:  Positive for malignancy has a 97-99% risk of malignancy, recommended management is total or near-total thyroidectomy               Adequacy:              Satisfactory for evaluation      Electronically signed by Mark Lu III, MD on 5/26/2023 at 11:43 AM   Comment     The specimen shows morphologic features  consistent with papillary thyroid carcinoma.  In some areas there are indications of a more poorly differentiated component with single cells and small aggregates of cells with marked nuclear atypia.  Clinical correlation is recommended.   Clinical Information     TRADS 4 lesions noted on thyroid US at [facility].  Hx of multiple cancers, please bx for eval for 3rd primary malignancy.   Rapid Onsite Evaluation     FNA Performance:   Fine needle aspiration was not performed by [facility] staff.  Aspirate immediate study/adequacy:  I, PEREZ ESTEVEZ MD, attest that I immediately examined smears while the procedure was underway and determined or confirmed the adequacy of the specimens via telepathology.  It is of note that the final assessment and report may be performed and signed by a different pathologist.  Onsite adequacy/interpretation:  A: Adequate   Gross Description     A(A). Thyroid, Right, 6.1cm:A. Thyroid, Right, 6.1cm, Fine Needle Aspirate:  Received are 4 fixed slides, processed for Pap stain, and 4 air dried slides, processed for Diff Quik stain. Afirma tube held.   Surgical Pathology Exam: [id number]  Order: [id number]    Collected 7/13/2023  8:38 AM    Status: Final result    Visible to patient: Yes (seen)    0 Result Notes        Component  Resulting Agency    Case Report    Surgical Pathology Report                         Case: [id number]                                    Authorizing Provider:  Antonio Morales MD  Collected:           07/13/2023 08:38 AM            Ordering Location:      MAIN OR                 Received:            07/13/2023 09:25 AM            Pathologist:           Brie Nino MD                                                    Specimen:    Other, Right Pyriform Sinus                                                                  Final Diagnosis    RIGHT PYRIFORM SINUS, BIOPSY:  -Benign squamous mucosa with minor salivary gland and  "tonsillar type tissue with reactive lymphoid hyperplasia.  -No evidence of malignancy identified.    Electronically signed by Brie Nino MD on 7/17/2023 at 11:10 AM    Clinical Information  [facility]    Procedure:  Flexible bronchoscopy, direct laryngoscopy  ESOPHAGOSCOPY  Pre-op Diagnosis: Malignant neoplasm of thyroid gland (H) [C73]  Post-op Diagnosis: C73 - Malignant neoplasm of thyroid gland (H) [ICD-10-CM]    Gross Description  [facility]    A(1). Other, Right Pyriform Sinus:  The specimen is received in formalin with proper patient identification, labeled \"right piriform sinus\".  The specimen consists of 3 pieces of pink-red soft tissue each 0.5 x 0.3 x 0.2 cm which are wrapped and entirely submitted in cassette A1.        Microscopic Description  [facility]    Microscopic examination has been performed (additional levels reviewed).       I have personally reviewed all specimens and or slides, including the listed special stains, and used them with my medical judgement to determine the final diagnosis.       Performing Labs  [facility]    The technical component of this testing was completed at [facility]                  Specimen Collected: 07/13/23  8:38 AM Last Resulted: 07/17/23 11:10 AM           Surgical Pathology Exam: [id number]  Order: [id number]  Collected 8/24/2023 10:32 AM    Status: Edited Result - FINAL    Visible to patient: Yes (not seen)    0 Result Notes        Component Ref Range & Units  Resulting Agency   Case Report   Surgical Pathology Report                         Case: [id number]                                   Authorizing Provider:  Antonoi Morales MD  Collected:           08/24/2023 10:32 AM           Ordering Location:      MAIN OR                 Received:            08/24/2023 10:38 AM           Pathologist:           Brie Nino MD                                                   Intraop:            "    Vicky Rey MD                                                   Specimens:   A) - Other, Evaluate for Right Inferior Parathyroid                                                  B) - Other, Total thyroidectomy, tracheal resection, stitch left superior pole                      C) - Other, Inferior tracheal wall                                                        Final Diagnosis   A. EVALUATE FOR RIGHT INFERIOR PARATHYROID, BIOPSY:  -Benign parathyroid tissue.  B. THYROID GLAND, TOTAL THYROIDECTOMY WITH TRACHEAL RESECTION:  -PAPILLARY THYROID CARCINOMA, classic/conventional type, 5.1 cm in greatest dimension.  -Tumor entirely replaces right thyroid lobe, it is unencapsulated, extends through tracheal cartilage, and involves right recurrent laryngeal nerve, tracheal submucosa and skeletal muscle.  -Angiolymphatic and perineural invasion are present.  -Margins are negative, including superior and inferior tracheal margins; closest uninvolved margin is anterior right lobe at 1 mm.  -BRAF V600E immunohistochemistry test is POSITIVE.  -One perithyroid lymph node focally involved by metastatic carcinoma, less than 1 mm (1/1).  -Isthmus and left thyroid lobe are negative for malignancy.  -AJCC pathologic staging is pT4a N1a.  -See synoptic report.  C. INFERIOR TRACHEAL WALL, EXCISION:  -Tracheal wall, negative for malignancy.   Amendment electronically signed by Brie Nino MD on 9/11/2023 at  6:27 AM  Electronically signed by Brie Nino MD on 9/9/2023 at  8:16 AM         Comments:   This is an appended report. These results have been appended to a previously preliminary verified report.  Corrected result: Previously reported as [Previous value contains rich text formatting which cannot be displayed here] (see Result History) on 9/9/2023 at  8:16 AM CDT.      Comment   [facility]   The report is amended to correct a typo regarding involvement of tracheal cartilage by neoplastic  disease. The final diagnosis remains otherwise unchanged.    Comment: This is an appended report. These results have been appended to a previously final verified report.   Synoptic Checklist   THYROID GLAND   8th Edition - Protocol posted: 6/30/2021THYROID GLAND: RESECTION - All Specimens  SPECIMEN   Procedure  Total thyroidectomy   TUMOR   Tumor Focality  Unifocal   Tumor Characteristics     Tumor Site  Right lobe   Tumor Size  Greatest Dimension (Centimeters): 5.1 cm   Histologic Type  Papillary carcinoma, classic (usual, conventional)   Angioinvasion (vascular invasion)  Extensive (4 or more vessels)   Lymphatic Invasion  Cannot be determined   Perineural Invasion  Present   Extrathyroidal Extension  Present, clinical / macroscopic AND histologically confirmed     Invading subcutaneous soft tissues, larynx, trachea, esophagus or recurrent laryngeal nerve (i.e., pT4a)   Margin Status  All margins negative for carcinoma   Distance from Invasive Carcinoma to Closest Margin  1 mm   REGIONAL LYMPH NODES   Regional Lymph Node Status  Tumor present in regional lymph node(s)   Number of Lymph Nodes with Tumor  1   Jam Level(s) Involved  Level VI   Size of Largest Metastatic Deposit  less than 0.1 cm   Extranodal Extension (PRAVEEN)  Not identified   Number of Lymph Nodes Examined  1   Jam Level(s) Examined  Level VI   PATHOLOGIC STAGE CLASSIFICATION (pTNM, AJCC 8th Edition)   Reporting of pT, pN, and (when applicable) pM categories is based on information available to the pathologist at the time the report is issued. As per the AJCC (Chapter 1, 8th Ed.) it is the managing physician s responsibility to establish the final pathologic stage based upon all pertinent information, including but potentially not limited to this pathology report.        pT Category  pT4a   pN Category  pN1a   ADDITIONAL FINDINGS   Additional Findings  None identified   Comment(s)  Representative blocks are B8, B9 and B10 (primary tumor) and B3/B5  "(tracheal cartilage involvement).  Best block for ancillary testing is B10.   .      Clinical Information   [facility]   Procedure:  total thyroidectomy, possible central neck dissection  direct laryngoscopy, flexible Bronchoscopy  Esophagoscopy  possible tracheostomy tube, possible T tube  tracheal resection  Pre-op Diagnosis: Malignant neoplasm of thyroid gland (H) [C73]  Post-op Diagnosis: C73 - Malignant neoplasm of thyroid gland (H) [ICD-10-CM]     38-year-old male with a large thyroid nodule. Fine needle aspirate was consistent with papillary thyroid carcinoma. On imaging there was concern of tracheal invasion with intraluminal disease.  On direct examination there was evidence of transmural invasion of the trachea. There was no evidence of disease in the esophagus.    Comment: This is an appended report. These results have been appended to a previously preliminary verified report.   Intraoperative Consultation   [facility]   A(1). Other, Evaluate for Right Inferior Parathyroid:  AFS1:  - Parathyroid tissue present        B(2). Other, Total thyroidectomy, tracheal resection, stitch left superior pole:  BFS1:  - Superior tracheal margin is negative for malignancy        C(3). Other, Inferior tracheal wall:  CFS1:  - Negative for malignancy         Gross Description   [facility]   A(1). Other, Evaluate for Right Inferior Parathyroid:  The specimen is received fresh for frozen section, with proper patient identification, labeled \"evaluate for right inferior parathyroid\".  It consists of a 0.05 g, 0.2 cm in greatest dimension portion of pink-red soft tissue.  The specimen is submitted entirely for frozen section.  The remainder of the frozen section block is submitted as A1FS.     B(2). Other, Total thyroidectomy, tracheal resection, stitch left superior pole:  The specimen is received fresh with proper patient identification labeled \"total thyroidectomy, tracheal resection, stitch left superior pole\".  The specimen consists " of a 68.6 g total thyroidectomy specimen (the right lobe is 5.1 cm superior to inferior, 4.5 cm medial to lateral, and 4.5 cm anterior to posterior, the left lobe is 4.7 cm superior to inferior, 2.4 cm medial to lateral, and 1.8 cm anterior to posterior, the isthmus is 2.1 cm superior to inferior, 1.3 cm medial to lateral, and 0.6 cm anterior to posterior) with attached trachea (2.6 cm in diameter and 2.5 cm in length).  It is received with a suture designating the left superior pole.  Additionally, the specimen is oriented with the surgeon.  A portion of the anterior superior tracheal margin is shaved and submitted for frozen section analysis.  The thyroid capsule is roughened but intact.  The specimen is inked as follows:     Anterior thyroid-blue  Posterior thyroid-black (left mid and inferior posterior thyroid inked, but not true margin, trachea represents true margin)  Superior tracheal margin-red  Area of frozen section (inked following the shaving of the frozen section)-yellow  Inferior tracheal margin-green     The specimen is serially sectioned and the entire right thyroid lobe from superior to inferior pole is involved by a 5.1 x 4.0 x 3.8 cm heterogenous mass, that is roughly 30% cystic and 70% solid with a tan-pink to red and brown gritty cut surface with numerous cystic areas lined with excrescences and is roughly 20 to 30% necrotic and 30% hemorrhagic.  The mass grossly invades into and through the tracheal cartilage into the trachea lumen, and measures 1.1 cm to the superior tracheal margin and 0.1 cm to the inferior tracheal margin. The mass also grossly abuts the inked anterior and posterior right thyroid lobe surfaces.  The mass does not appear to grossly involve the isthmus or left thyroid lobe, and the left thyroid lobe cut surface consist of red-brown firm and grossly unremarkable thyroid parenchyma.     Gross photos are taken.     Summary of Sections:     B1 FS-remainder of frozen section of  "representative shaved superior tracheal margin  B2-remaining superior tracheal margin shaved, submitted en face  B3-perpendicular transmural section of mass, right thyroid lobe and trachea with inferior tracheal margin  B4-remaining inferior tracheal margin shaved, submitted en face   B5-additional transmural section of mass, right thyroid lobe and trachea  B6-right thyroid lobe mass and adjacent isthmus lobe  B7-superior pole right thyroid mass and anterior surface  B8-representative sections of midpole right thyroid mass and anterior and posterior surfaces  B9-lower pole right thyroid mass with area of possible hemorrhage and necrosis and anterior surface  P96-hjbximmynr representative sections of mid right thyroid lobe mass  N90-mloyxzccpc section of isthmus lobe  Q90-uonlileddzvneb sections of left superior pole thyroid lobe  O22-iypemyjhfhmwzw sections of left mid thyroid lobe (black inked surface not a true margin)  F84-uggbhkfvhqgzqo sections of left inferior pole thyroid lobe (black inked surface not a true margin)  C(3). Other, Inferior tracheal wall:  The specimen is received fresh for frozen section, with proper patient identification, labeled \"inferior tracheal wall\".  It consists of a 1.2 x 0.4 x 0.2 cm piece of tan-pink soft tissue.  The specimen is submitted entirely for frozen section.  The remainder of the frozen section block is submitted as C1FS.      Comment: This is an appended report. These results have been appended to a previously preliminary verified report.   Microscopic Description  [facility]   Microscopic examination has been performed.  Cytokeratin AE1/AE3 immunostain, performed with appropriate controls on blocks B3 and B5, highlight papillary thyroid carcinoma involving tracheal cartilage.     Representative blocks are B8, B9 and B10 (primary tumor) and B3/B5 (thyroid cartilage involvement).  Best block for ancillary testing is B10.     I have personally reviewed all specimens and or " slides, including the listed special stains, and used them with my medical judgement to determine the final diagnosis.      Comment: This is an appended report. These results have been appended to a previously preliminary verified report.  Corrected result: Previously reported as [Previous value contains rich text formatting which cannot be displayed here] (see Result History) on 9/9/2023 at  8:16 AM CDT.   MCRS N/A Yes Abnormal  [facility]   Comment: This is an appended report.  These results have been appended to a previously preliminary verified report.   Performing Labs   [facility]   The technical component of this testing was completed at [facility]   Comment: This is an appended report. These results have been appended to a previously preliminary verified report.                 Specimen Collected: 08/24/23 10:32 AM Last Resulted: 09/11/23  6:27 AM       Order Details        View Encounter        Lab and Collection Details        Routing        Result History     View All Conversations on this Encounter                      Narrative & Impression   US THYROID  4/6/2017 2:54 PM     INDICATION: Localized swelling, mass and lump, neck  TECHNIQUE: Routine.  COMPARISON: None.     FINDINGS:  Right thyroid lobe measures 5.8 x 3.8 x 3.5 cm. Solid isoechoic mass replacing a majority of the right thyroid lobe measures 4.1 x 2.7 x 3.4 cm. Separate mostly cystic partly solid nodule at the right lower pole measuring 1.3 x 1.3 x 1.0 cm.      Left thyroid lobe measures 5.2 x 1.7 x 1.8 cm. Normal echotexture with no focal nodule.     Isthmus measures 2 mm. No additional findings.     No cervical lymphadenopathy.     Targeted scanning was also performed at sites of lumps in the lateral left upper extremity and left lower back. Corresponding to the palpable lump of the left arm is a sharply circumscribed 8 x 8 x 5 mm hypoechoic nodule with enhanced through   transmission of  echoes in the subcutaneous fat. There is a similar finding in the subcutaneous fat of the left lower back corresponding to the palpable lump measuring 6 x 4 x 6 mm. These are probably skin related lesions, possibly sebaceous cysts.     IMPRESSION:  CONCLUSION:  1.  4.1 cm solid mass of the right thyroid lobe. Biopsy under ultrasound guidance suggested.  2.  Separate mostly cystic partly solid nodule at the right lower pole measuring 13 mm.  3.  Small subcutaneous cystic nodules of the left arm and left lower back correlating to palpable lumps are probably skin related lesion, possibly sebaceous cysts. Clinical follow-up suggested.     The sonographer communicated the findings to the referring provider after conclusion of the examination on 4/6/2017.     EXAM: Thyroid ultrasound 5/19/2023 10:00 AM      HISTORY: Thyroid nodule.      COMPARISON: Outside hospital thyroid ultrasound report from 4/6/2017.  Chest CT 3/3/2023     TECHNIQUE: Grayscale and color ultrasound imaging of the thyroid was  performed.     FINDINGS:    Thyroid parenchyma: Heterogeneous right lobe, homogeneous left lobe  The right lobe of the thyroid measures: 6.1 x 5.9 x 4.2 cm  The left lobe of the thyroid measures: 5.1 x 1.9 x 1.7 cm  The thyroid isthmus measures: 0.3 cm     Right lobe:  Nodule: not numbered on images  Location: Entire right lobe  Nodule measurement: 6.1 x 5.9 x 4.2 cm, previously 4.1 x 2.7 x 3.4 cm  Composition: Almost completely solid (2)  Echogenicity: Isoechoic (1)  Shape: Wider-than-tall (0)  Margin: Smooth (0)  Echogenic Foci: Punctate echogenic foci (3)  Total Points: 6 (TR4)     Nodule:   Location: Inferior right lobe  Nodule measurement: 1.6 x 1.4 x 1.1 cm, previously 1.3 x 1.3 x 1.0 cm  Composition: Mixed cystic and solid (1) with a fluid fluid level  Echogenicity: Isoechoic (1)  Shape: Wider-than-tall (0)  Margin: Smooth (0)  Echogenic Foci: None (0)  Total Points: 2 (TR2)     No nodules in the left lobe or isthmus.                                                                       Impression:  1.  Increased 6.1 cm suspicious nodule replacing/enlarging the right  thyroid lobe (TR4), previously 4.1 cm on 4/6/2017. Tissue sampling  recommended.   2.  Slightly increased 1.6 cm right inferior lobe thyroid nodule  (TR2), previously 1.3 cm on 4/6/2017.     ACR TI-RADS recommendations  TR2 (2 points) & TR1 (0 points) -No FNA or follow-up. (Approximate  risk of malignancy-2% or less)  TR3 (3 points) - FNA if ? 2.5cm, follow-up if 1.5 -2.4 cm in 1, 3 and  5 years. (Approximate risk of malignancy-2.1-5%)  TR4 (4-6 points) - FNA if ? 1.5cm, follow-up if 1 -1.4 cm in 1, 2, 3  and 5 years. (Approximate risk of malignancy-5.1-20 %)  TR5 (?7 points) - FNA if ? 1cm, follow-up if 0.5 -0.9 cm every year  for 5 years. (Approximate risk of malignancy-more than 20%)     I have personally reviewed the examination and initial interpretation  and I agree with the findings.     CARLOS ENRIQUE PORTER, DO     EXAM: CT SOFT TISSUE NECK W CONTRAST  6/6/2023 5:05 PM      HISTORY:  papillary thyroid cancer. Please complete CT neck with  contrast to evaluate; Malignant neoplasm of thyroid gland (H)         COMPARISON:  Thyroid ultrasound 5/19/2023. Chest CT 3/3/2023.      TECHNIQUE: Following intravenous administration of nonionic iodinated  contrast medium, thin section helical CT images were obtained from the  skull base down to the level of the aortic arch.  Axial, coronal and  sagittal reformations were performed with 2-3 mm slice thickness  reconstruction. Images were reviewed in soft tissue, lung and bone  windows.     CONTRAST: ISOVUE 370 90cc     FINDINGS:   No nasopharyngeal, oropharyngeal, hypopharyngeal, or glottic mucosal  space abnormality. Normal tongue base. Salivary glands are within  normal limits.     No lymphadenopathy.     Heterogeneous right thyroid mass measuring 4.5 x 5.5 x 5.1 cm  corresponding to the mass which underwent a recent FNA on  5/25/2023.  Associated mass effect with leftward shift of the trachea and moderate  narrowing of the trachea.     Patent cervical vasculature; no high grade arterial stenosis.     No suspicious osseus lesion. No high grade spinal canal stenosis.     Carious right maxillary molar. The imaged skull base, intracranial and  orbital structures are within normal limits.     Clear lung apices.                                                                      IMPRESSION:   1. Right thyroid mass consistent with known papillary thyroid  carcinoma. Associated local mass effect with moderate narrowing of the  trachea.  2. No cervical adenopathy.     LORIE SWIFT MD       Examination: CT CHEST W/O CONTRAST, 6/26/2023 10:39 AM      History: Malignant neoplasm of thyroid gland (H)     Comparison: 3/3/2023 chest CT, 6/26/2023 soft tissue neck CT     TECHNIQUE: Helical acquisition of CT images from the lung apices to  the kidneys without IV contrast. Coronal and axial MIP images were  reconstructed from the source data.     FINDINGS:      Lungs:  No pleural effusion or pneumothorax. No focal consolidation. Bilateral  dependent subsegmental atelectasis. No suspicious pulmonary nodule.  The central tracheobronchial tree is clear. No bronchial wall  thickening or bronchiectasis.     Chest:   Normal heart size. No pericardial effusion. Normal caliber ascending  aorta and main pulmonary artery. No thoracic lymphadenopathy. Stable  heterogeneous enlargement of the right thyroid with narrowing and  leftward deviation of the trachea     Upper abdomen: Limited evaluation of the upper abdomen.      Bones: No acute or aggressive osseus abnormality.                                                                      IMPRESSION:   1. No evidence of metastatic disease in the chest.  2. Continued masslike enlargement of the right thyroid lobe with  narrowing and leftward deviation of the trachea.     CARLOS ENRIQUE PORTER DO         SYSTEM ID:   [id number]

## 2023-09-27 NOTE — PATIENT INSTRUCTIONS
Blood and urine test today    Blood test every 4 months for the next year     We will plan on radioactive iodine treatment after we have confirmed that the iodine exposure has cleared    See me in 1 month

## 2023-09-27 NOTE — LETTER
9/27/2023       RE: Leonid Mcmillan  [address] [place] [address]     Dear Colleague,    Thank you for referring your patient, Leonid Mcmillan, to the [facility] ENDOCRINOLOGY CLINIC [place] at [facility]. Please see a copy of my visit note below.    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  DONTAE recurrent risk high   Recommend radioactive iodine after we have confirmed the iodine exposure has cleared.  We may need to work around his travel to [facility] in December.  Arrangements will need to take into account the 6 year old child at home.  Radioiodine has been shown to improve survival and decrease recurrence in individuals with pT4 disease.   Labs today: Tg, TSH, free T4, Ca, spot urine iodine   I have counseled today on the following:  Radioiodine treatment procedure, risks of treatment  Radiation safety precautions  Low iodine diet  Plans for future follow up    Post surgical hypothyroidism.  On LT4 112 mcg/day. Refilled at present dose.  Treat to target TSH < 0.2 .  TFTS following appt show high TSH.  Increase LT4 to 112 *8/week (mean 128 mcg/day)  Repeat labs in 1-2 months     Iodinated contrast exposure 6/6/2023.  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   He has low volume voice and some symptoms.      I have independently reviewed and interpreted labs, imaging as indicated.  He was seen by me along with Dr Anthony Lozada, endocrine fellow.   _90_ minutes spent on the date of the encounter doing  chart review, history and exam, documentation and further activities as noted above.      Leslie Galvez MD    Chief complaint:  Leonid is a 38 year old male seen in consultation at the request of Dr Morales for thyroid cancer.   I have reviewed Care Everywhere including [facility] lab reports, imaging reports and provider notes as indicated.      HISTORY OF PRESENT ILLNESS    Leonid presents today along with his wife, who served as .  He first became aware of right thyroid mass in 2014, before he left [place].    Right thyroid mass was seem on Thyroid US in 2017, followed by recommendation for FNAB.  Recommendation for FNAB was again made in 2021.  He presented to the ED with hemoptysis 3/3/2023.  This was followed by CT neck and chest which showed the right mass. He was seen by Dr Ac (ENT) and then Dr Morales (ENT). 6/26 fiberoptic laryngoscopy by Dr Morales showed tumor emanating into the airway.  FNAB of the right thyroid mass was positive for papillary thyroid carcinoma.   Treatment of the thyroid cancer has been as follows:   8/24/23 total thyroidectomy, tracheal resection (3 rings removed), sacrifice of the RLN, reimplanation of right inferior parathyroid gland.  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  He was hospitalized 4 days  He is on LT4 112 mcg/, almost out of his current supply.  He has recovered from the surgery , with the exception of his voice.     Endocrine relevant labs are as follows:  4/6/17 TSH 0.89  3/3/23 TSH 1.07, Ca 8.7  8/24/23 PTH 31, Ca 8.5,   8/27/23 Ca 8.2, glucose 104   9/28/23 following appt, not discussed at appt Tg pending, TSH 11.87, free T4 1.39, Ca 9.4, phos 4.3,     Relevant imaging is as follows: (as read by me as it pertains to  endocrine relevant organs)  3/3/2023 CT neck and chest: large right thyorid mass with tracheal deviation to the left ; some tracheal compression   5/19/2023 thyroid US copmared with 4/6/17   Right thyroid mass (5.9 x 4.2 x  expands right lobe - anterior borders suggestion suggestive of ETE ; heterogeneous; few echogenic foci; was 3.4 x 3.5 x 5.8 cm -- FNAB 5/25/23   Right inf # 1 1.4 x 1.1 x 1.6 cm - ? Extrathyroidal ; was   1.3 x 1 x 1.3   Left lobe smallish   6/6/23 neck US: no abnormal LNs  6/6/23 CT chest and neck with contrast - right thyroid mass very heerogeneous - extends posteriorly to vertebral body; tracheal deviation and some compression; irregular right tracheal border ? Shows site of invasion?     Childhod radiation exposure: none ; no medical xray     REVIEW OF SYSTEMS  Energy OK  Sleep OK  Voice is raspy; low volume ; voice is best in the AM  Some choking on water -   Cardiac: intermittent BP very low - down to 110/60; he can feel it pump and he has to catch breath.   Happens fast and then resolves.  It could last  hours.  It has been occurring   less frequently - last time was days ago  Respiratory: exercise tolerance reduced from before surgery -- can feel HR more with exertion  GI: negative; BM x 1/day; no N/V/abd pain   No muscle cramps  No paresthesias  No pains  No headaches  No dizziness   10 system ROS otherwise as per the HPI or negative    Past Medical History  Past Medical History:   Diagnosis Date    Infection due to 2019 novel coronavirus 02/2023    Papillary thyroid carcinoma (H) 08/24/2023    Postoperative hypothyroidism 08/24/2023    Thyroid mass 2014     Past Surgical History:   Procedure Laterality Date    BRONCHOSCOPY FLEXIBLE AND RIGID N/A 7/13/2023    Procedure: Flexible bronchoscopy, direct laryngoscopy;  Surgeon: Antonio Morales MD;  Location: [facility]    ESOPHAGOSCOPY N/A 7/13/2023    Procedure: ESOPHAGOSCOPY;  Surgeon: Antonio Morales MD;  Location: [facility]     ESOPHAGOSCOPY N/A 8/24/2023    Procedure: Esophagoscopy;  Surgeon: Antonio Morales MD;  Location: [facility]    LARYNGOSCOPY, BRONCHOSCOPY, COMBINED N/A 8/24/2023    Procedure: direct laryngoscopy, flexible Bronchoscopy;  Surgeon: Antonio Morales MD;  Location: [facility]    RESECT TRACHEA WITH RECONSTRUCTION N/A 8/24/2023    Procedure: tracheal resection;  Surgeon: Pieter Ballard MD;  Location: [facility] OR    SHOULDER SURGERY Right 2005    shoulder injury, did not have general anesthesia    THYROIDECTOMY N/A 8/24/2023    Procedure: total thyroidectomy;  Surgeon: Antonio Morales MD;  Location: [facility] OR     Medications  Current Outpatient Medications   Medication Sig Dispense Refill    levothyroxine (SYNTHROID/LEVOTHROID) 112 MCG tablet Take 1 tablet (112 mcg) by mouth every morning (before breakfast) 90 tablet 4     Allergies  No Known Allergies      Family History  Family History   Problem Relation Age of Onset    Diabetes Father     Anesthesia Reaction No family hx of     Venous thrombosis No family hx of     Myocardial Infarction No family hx of     Cerebrovascular Disease No family hx of     Thyroid Cancer No family hx of     Goiter No family hx of     Cancer No family hx of        Social History  Social History     Tobacco Use    Smoking status: Never    Smokeless tobacco: Never   Substance Use Topics    Alcohol use: Never    Drug use: Never     Came from [facility] 2014   6 year old daughter; ; currently at home, not working   ; traveling to [facility] in December.     Physical Exam  BP (!) 132/92   Pulse 69   Wt 71.1 kg (156 lb 12.8 oz)   SpO2 98%   BMI 26.91 kg/m    Body mass index is 26.91 kg/m .  GENERAL :  In no apparent distress; mask; low volume voice ; mask  SKIN: Normal color, normal temperature, texture.   EYES: PER, No scleral icterus,  No proptosis, conjunctival redness, stare, retraction  NECK: healed low transverse surgical scar.  No visible masses. No palpable adenopathy, or masses.  RESP:  Lungs clear to auscultation bilaterally  CARDIAC: Regular rate and rhythm, normal S1 S2, without murmurs, rubs or gallops       NEURO: awake, alert, responds appropriately to questions. Moves all extremities; Gait normal.  No tremor of the outstretched hand.  DTRs 1/4  EXTREMITIES: No clubbing, cyanosis or edema.    DATA REVIEW     Latest Ref Rn 9/27/2023  4:55 PM   [facility] THYROID LABS[facility]     TSH 0.30 - 4.20 uIU/mL 11.87 (H)    FREE T4 0.90 - 1.70 ng/dL 1.39       Legend:  (H) High     Latest Ref Rn 8/11/2023  9:30 AM 8/24/2023  4:52 PM 8/25/2023  9:23 AM   END[facility] CALCIUM LABS[facility]       Albumin 3.5 - 5.2 g/dL  4.0     Alkaline Phosphatase 45 - 120 U/L      Calcium 8.6 - 10.0 mg/dL 9.6  8.5 (L)  8.5 (L)    Urea Nitrogen 8 - 22 mg/dL      Urea Nitrogen 6.0 - 20.0 mg/dL 16.5   11.2    Creatinine 0.67 - 1.17 mg/dL 0.85   0.80    Magnesium 1.7 - 2.3 mg/dL   2.1    Parathyroid Hormone Intact 15 - 65 pg/mL  31        Latest Ref Rn 8/27/2023  7:48 AM 9/27/2023  4:55 PM   ENDO CALCIUM LABS[facility]      Albumin 3.5 - 5.2 g/dL     Alkaline Phosphatase 45 - 120 U/L     Calcium 8.6 - 10.0 mg/dL 8.2 (L)  9.4    Urea Nitrogen 8 - 22 mg/dL     Urea Nitrogen 6.0 - 20.0 mg/dL 6.4     Creatinine 0.67 - 1.17 mg/dL 0.70     Magnesium 1.7 - 2.3 mg/dL     Parathyroid Hormone Intact 15 - 65 pg/mL        Legend:  (L) Low  tains abnormal data Fine Needle Aspirate Thyroid, Right: [id number]  Order: [id number]  Collected 5/25/2023  9:27 AM    Status: Final result    Visible to patient: Yes (seen)    Dx: Thyroid nodule    0 Result Notes       Component Ref Range & Units    Final Diagnosis   Specimen A              Interpretation:              Positive for malignancy  Morphologically consistent with papillary thyroid carcinoma (see comment)   The [place] implied risk of malignancy and recommended clinical management:  Positive for malignancy has a 97-99% risk of malignancy, recommended management is total or near-total thyroidectomy                Adequacy:              Satisfactory for evaluation      Electronically signed by Mark Lu III, MD on 5/26/2023 at 11:43 AM   Comment     The specimen shows morphologic features consistent with papillary thyroid carcinoma.  In some areas there are indications of a more poorly differentiated component with single cells and small aggregates of cells with marked nuclear atypia.  Clinical correlation is recommended.   Clinical Information     TRADS 4 lesions noted on thyroid US at [facility].  Hx of multiple cancers, please bx for eval for 3rd primary malignancy.   Rapid Onsite Evaluation     FNA Performance:   Fine needle aspiration was not performed by [facility] staff.  Aspirate immediate study/adequacy:  IHERB DAVID MARSHALL, MD, attest that I immediately examined smears while the procedure was underway and determined or confirmed the adequacy of the specimens via telepathology.  It is of note that the final assessment and report may be performed and signed by a different pathologist.  Onsite adequacy/interpretation:  A: Adequate   Gross Description     A(A). Thyroid, Right, 6.1cm:A. Thyroid, Right, 6.1cm, Fine Needle Aspirate:  Received are 4 fixed slides, processed for Pap stain, and 4 air dried slides, processed for Diff Quik stain. Afirma tube held.   Surgical Pathology Exam: [id number]  Order: [id number]    Collected 7/13/2023  8:38 AM    Status: Final result    Visible to patient: Yes (seen)    0 Result Notes        Component  Resulting Agency    Case Report    Surgical Pathology Report                         Case: [id number]                                    Authorizing Provider:  Antonio Morales MD  Collected:           07/13/2023 08:38 AM            Ordering Location:     [facility]                 Received:            07/13/2023 09:25 AM            Pathologist:           Brie Nino MD                                                    Specimen:    Other, Right  "Pyriform Sinus                                                                  Final Diagnosis    RIGHT PYRIFORM SINUS, BIOPSY:  -Benign squamous mucosa with minor salivary gland and tonsillar type tissue with reactive lymphoid hyperplasia.  -No evidence of malignancy identified.    Electronically signed by Brie Nino MD on 7/17/2023 at 11:10 AM    Clinical Information  [facility]    Procedure:  Flexible bronchoscopy, direct laryngoscopy  ESOPHAGOSCOPY  Pre-op Diagnosis: Malignant neoplasm of thyroid gland (H) [C73]  Post-op Diagnosis: C73 - Malignant neoplasm of thyroid gland (H) [ICD-10-CM]    Gross Description  [facility]    A(1). Other, Right Pyriform Sinus:  The specimen is received in formalin with proper patient identification, labeled \"right piriform sinus\".  The specimen consists of 3 pieces of pink-red soft tissue each 0.5 x 0.3 x 0.2 cm which are wrapped and entirely submitted in cassette A1.        Microscopic Description  [facility]    Microscopic examination has been performed (additional levels reviewed).       I have personally reviewed all specimens and or slides, including the listed special stains, and used them with my medical judgement to determine the final diagnosis.       Performing Labs  [facility]    The technical component of this testing was completed at [facility]                  Specimen Collected: 07/13/23  8:38 AM Last Resulted: 07/17/23 11:10 AM           Surgical Pathology Exam: [id number]  Order: [id number]  Collected 8/24/2023 10:32 AM    Status: Edited Result - FINAL    Visible to patient: Yes (not seen)    0 Result Notes        Component Ref Range & Units  Resulting Agency   Case Report   Surgical Pathology Report                         Case: [id number]                                   Authorizing Provider:  Antonio Morales MD  Collected:           08/24/2023 10:32 AM           Ordering Location:     [facility]    "              Received:            08/24/2023 10:38 AM           Pathologist:           Brie Nino MD                                                   Intraop:               Vicky eRy MD                                                   Specimens:   A) - Other, Evaluate for Right Inferior Parathyroid                                                  B) - Other, Total thyroidectomy, tracheal resection, stitch left superior pole                      C) - Other, Inferior tracheal wall                                                        Final Diagnosis   A. EVALUATE FOR RIGHT INFERIOR PARATHYROID, BIOPSY:  -Benign parathyroid tissue.  B. THYROID GLAND, TOTAL THYROIDECTOMY WITH TRACHEAL RESECTION:  -PAPILLARY THYROID CARCINOMA, classic/conventional type, 5.1 cm in greatest dimension.  -Tumor entirely replaces right thyroid lobe, it is unencapsulated, extends through tracheal cartilage, and involves right recurrent laryngeal nerve, tracheal submucosa and skeletal muscle.  -Angiolymphatic and perineural invasion are present.  -Margins are negative, including superior and inferior tracheal margins; closest uninvolved margin is anterior right lobe at 1 mm.  -BRAF V600E immunohistochemistry test is POSITIVE.  -One perithyroid lymph node focally involved by metastatic carcinoma, less than 1 mm (1/1).  -Isthmus and left thyroid lobe are negative for malignancy.  -AJCC pathologic staging is pT4a N1a.  -See synoptic report.  C. INFERIOR TRACHEAL WALL, EXCISION:  -Tracheal wall, negative for malignancy.   Amendment electronically signed by Brie Nino MD on 9/11/2023 at  6:27 AM  Electronically signed by Brie Nino MD on 9/9/2023 at  8:16 AM         Comments:   This is an appended report. These results have been appended to a previously preliminary verified report.  Corrected result: Previously reported as [Previous value contains rich text formatting which cannot be displayed  here] (see Result History) on 9/9/2023 at  8:16 AM CDT.      Comment   [facility]   The report is amended to correct a typo regarding involvement of tracheal cartilage by neoplastic disease. The final diagnosis remains otherwise unchanged.    Comment: This is an appended report. These results have been appended to a previously final verified report.   Synoptic Checklist   THYROID GLAND   8th Edition - Protocol posted: 6/30/2021THYROID GLAND: RESECTION - All Specimens  SPECIMEN   Procedure  Total thyroidectomy   TUMOR   Tumor Focality  Unifocal   Tumor Characteristics     Tumor Site  Right lobe   Tumor Size  Greatest Dimension (Centimeters): 5.1 cm   Histologic Type  Papillary carcinoma, classic (usual, conventional)   Angioinvasion (vascular invasion)  Extensive (4 or more vessels)   Lymphatic Invasion  Cannot be determined   Perineural Invasion  Present   Extrathyroidal Extension  Present, clinical / macroscopic AND histologically confirmed     Invading subcutaneous soft tissues, larynx, trachea, esophagus or recurrent laryngeal nerve (i.e., pT4a)   Margin Status  All margins negative for carcinoma   Distance from Invasive Carcinoma to Closest Margin  1 mm   REGIONAL LYMPH NODES   Regional Lymph Node Status  Tumor present in regional lymph node(s)   Number of Lymph Nodes with Tumor  1   Jam Level(s) Involved  Level VI   Size of Largest Metastatic Deposit  less than 0.1 cm   Extranodal Extension (PRAVEEN)  Not identified   Number of Lymph Nodes Examined  1   Jam Level(s) Examined  Level VI   PATHOLOGIC STAGE CLASSIFICATION (pTNM, AJCC 8th Edition)   Reporting of pT, pN, and (when applicable) pM categories is based on information available to the pathologist at the time the report is issued. As per the AJCC (Chapter 1, 8th Ed.) it is the managing physician s responsibility to establish the final pathologic stage based upon all pertinent information, including but potentially not limited to this pathology report.       "  pT Category  pT4a   pN Category  pN1a   ADDITIONAL FINDINGS   Additional Findings  None identified   Comment(s)  Representative blocks are B8, B9 and B10 (primary tumor) and B3/B5 (tracheal cartilage involvement).  Best block for ancillary testing is B10.   .      Clinical Information   [facility]   Procedure:  total thyroidectomy, possible central neck dissection  direct laryngoscopy, flexible Bronchoscopy  Esophagoscopy  possible tracheostomy tube, possible T tube  tracheal resection  Pre-op Diagnosis: Malignant neoplasm of thyroid gland (H) [C73]  Post-op Diagnosis: C73 - Malignant neoplasm of thyroid gland (H) [ICD-10-CM]     38-year-old male with a large thyroid nodule. Fine needle aspirate was consistent with papillary thyroid carcinoma. On imaging there was concern of tracheal invasion with intraluminal disease.  On direct examination there was evidence of transmural invasion of the trachea. There was no evidence of disease in the esophagus.    Comment: This is an appended report. These results have been appended to a previously preliminary verified report.   Intraoperative Consultation   [facility]   A(1). Other, Evaluate for Right Inferior Parathyroid:  AFS1:  - Parathyroid tissue present        B(2). Other, Total thyroidectomy, tracheal resection, stitch left superior pole:  BFS1:  - Superior tracheal margin is negative for malignancy        C(3). Other, Inferior tracheal wall:  CFS1:  - Negative for malignancy         Gross Description   [facility]   A(1). Other, Evaluate for Right Inferior Parathyroid:  The specimen is received fresh for frozen section, with proper patient identification, labeled \"evaluate for right inferior parathyroid\".  It consists of a 0.05 g, 0.2 cm in greatest dimension portion of pink-red soft tissue.  The specimen is submitted entirely for frozen section.  The remainder of the frozen section block is submitted as A1FS.     B(2). Other, Total thyroidectomy, tracheal resection, stitch left " "superior pole:  The specimen is received fresh with proper patient identification labeled \"total thyroidectomy, tracheal resection, stitch left superior pole\".  The specimen consists of a 68.6 g total thyroidectomy specimen (the right lobe is 5.1 cm superior to inferior, 4.5 cm medial to lateral, and 4.5 cm anterior to posterior, the left lobe is 4.7 cm superior to inferior, 2.4 cm medial to lateral, and 1.8 cm anterior to posterior, the isthmus is 2.1 cm superior to inferior, 1.3 cm medial to lateral, and 0.6 cm anterior to posterior) with attached trachea (2.6 cm in diameter and 2.5 cm in length).  It is received with a suture designating the left superior pole.  Additionally, the specimen is oriented with the surgeon.  A portion of the anterior superior tracheal margin is shaved and submitted for frozen section analysis.  The thyroid capsule is roughened but intact.  The specimen is inked as follows:     Anterior thyroid-blue  Posterior thyroid-black (left mid and inferior posterior thyroid inked, but not true margin, trachea represents true margin)  Superior tracheal margin-red  Area of frozen section (inked following the shaving of the frozen section)-yellow  Inferior tracheal margin-green     The specimen is serially sectioned and the entire right thyroid lobe from superior to inferior pole is involved by a 5.1 x 4.0 x 3.8 cm heterogenous mass, that is roughly 30% cystic and 70% solid with a tan-pink to red and brown gritty cut surface with numerous cystic areas lined with excrescences and is roughly 20 to 30% necrotic and 30% hemorrhagic.  The mass grossly invades into and through the tracheal cartilage into the trachea lumen, and measures 1.1 cm to the superior tracheal margin and 0.1 cm to the inferior tracheal margin. The mass also grossly abuts the inked anterior and posterior right thyroid lobe surfaces.  The mass does not appear to grossly involve the isthmus or left thyroid lobe, and the left thyroid " "lobe cut surface consist of red-brown firm and grossly unremarkable thyroid parenchyma.     Gross photos are taken.     Summary of Sections:     B1 FS-remainder of frozen section of representative shaved superior tracheal margin  B2-remaining superior tracheal margin shaved, submitted en face  B3-perpendicular transmural section of mass, right thyroid lobe and trachea with inferior tracheal margin  B4-remaining inferior tracheal margin shaved, submitted en face   B5-additional transmural section of mass, right thyroid lobe and trachea  B6-right thyroid lobe mass and adjacent isthmus lobe  B7-superior pole right thyroid mass and anterior surface  B8-representative sections of midpole right thyroid mass and anterior and posterior surfaces  B9-lower pole right thyroid mass with area of possible hemorrhage and necrosis and anterior surface  I17-zlmdyxqlzk representative sections of mid right thyroid lobe mass  L29-jhwrwrbmhd section of isthmus lobe  H99-rcfwztdflhncsj sections of left superior pole thyroid lobe  E38-dlaswxomhgezxd sections of left mid thyroid lobe (black inked surface not a true margin)  E84-qpvvrghychqyws sections of left inferior pole thyroid lobe (black inked surface not a true margin)  C(3). Other, Inferior tracheal wall:  The specimen is received fresh for frozen section, with proper patient identification, labeled \"inferior tracheal wall\".  It consists of a 1.2 x 0.4 x 0.2 cm piece of tan-pink soft tissue.  The specimen is submitted entirely for frozen section.  The remainder of the frozen section block is submitted as C1FS.      Comment: This is an appended report. These results have been appended to a previously preliminary verified report.   Microscopic Description  [facility]   Microscopic examination has been performed.  Cytokeratin AE1/AE3 immunostain, performed with appropriate controls on blocks B3 and B5, highlight papillary thyroid carcinoma involving tracheal cartilage.   "   Representative blocks are B8, B9 and B10 (primary tumor) and B3/B5 (thyroid cartilage involvement).  Best block for ancillary testing is B10.     I have personally reviewed all specimens and or slides, including the listed special stains, and used them with my medical judgement to determine the final diagnosis.      Comment: This is an appended report. These results have been appended to a previously preliminary verified report.  Corrected result: Previously reported as [Previous value contains rich text formatting which cannot be displayed here] (see Result History) on 9/9/2023 at  8:16 AM CDT.   MCRS N/A Yes Abnormal  [facility]   Comment: This is an appended report.  These results have been appended to a previously preliminary verified report.   Performing Labs   [facility]   The technical component of this testing was completed at [facility]   Comment: This is an appended report. These results have been appended to a previously preliminary verified report.                 Specimen Collected: 08/24/23 10:32 AM Last Resulted: 09/11/23  6:27 AM       Order Details        View Encounter        Lab and Collection Details        Routing        Result History     View All Conversations on this Encounter                      Narrative & Impression   US THYROID  4/6/2017 2:54 PM     INDICATION: Localized swelling, mass and lump, neck  TECHNIQUE: Routine.  COMPARISON: None.     FINDINGS:  Right thyroid lobe measures 5.8 x 3.8 x 3.5 cm. Solid isoechoic mass replacing a majority of the right thyroid lobe measures 4.1 x 2.7 x 3.4 cm. Separate mostly cystic partly solid nodule at the right lower pole measuring 1.3 x 1.3 x 1.0 cm.      Left thyroid lobe measures 5.2 x 1.7 x 1.8 cm. Normal echotexture with no focal nodule.     Isthmus measures 2 mm. No additional findings.     No cervical lymphadenopathy.     Targeted scanning was also performed at sites of lumps in the  lateral left upper extremity and left lower back. Corresponding to the palpable lump of the left arm is a sharply circumscribed 8 x 8 x 5 mm hypoechoic nodule with enhanced through   transmission of echoes in the subcutaneous fat. There is a similar finding in the subcutaneous fat of the left lower back corresponding to the palpable lump measuring 6 x 4 x 6 mm. These are probably skin related lesions, possibly sebaceous cysts.     IMPRESSION:  CONCLUSION:  1.  4.1 cm solid mass of the right thyroid lobe. Biopsy under ultrasound guidance suggested.  2.  Separate mostly cystic partly solid nodule at the right lower pole measuring 13 mm.  3.  Small subcutaneous cystic nodules of the left arm and left lower back correlating to palpable lumps are probably skin related lesion, possibly sebaceous cysts. Clinical follow-up suggested.     The sonographer communicated the findings to the referring provider after conclusion of the examination on 4/6/2017.     EXAM: Thyroid ultrasound 5/19/2023 10:00 AM      HISTORY: Thyroid nodule.      COMPARISON: Outside hospital thyroid ultrasound report from 4/6/2017.  Chest CT 3/3/2023     TECHNIQUE: Grayscale and color ultrasound imaging of the thyroid was  performed.     FINDINGS:    Thyroid parenchyma: Heterogeneous right lobe, homogeneous left lobe  The right lobe of the thyroid measures: 6.1 x 5.9 x 4.2 cm  The left lobe of the thyroid measures: 5.1 x 1.9 x 1.7 cm  The thyroid isthmus measures: 0.3 cm     Right lobe:  Nodule: not numbered on images  Location: Entire right lobe  Nodule measurement: 6.1 x 5.9 x 4.2 cm, previously 4.1 x 2.7 x 3.4 cm  Composition: Almost completely solid (2)  Echogenicity: Isoechoic (1)  Shape: Wider-than-tall (0)  Margin: Smooth (0)  Echogenic Foci: Punctate echogenic foci (3)  Total Points: 6 (TR4)     Nodule:   Location: Inferior right lobe  Nodule measurement: 1.6 x 1.4 x 1.1 cm, previously 1.3 x 1.3 x 1.0 cm  Composition: Mixed cystic and solid (1)  with a fluid fluid level  Echogenicity: Isoechoic (1)  Shape: Wider-than-tall (0)  Margin: Smooth (0)  Echogenic Foci: None (0)  Total Points: 2 (TR2)     No nodules in the left lobe or isthmus.                                                                      Impression:  1.  Increased 6.1 cm suspicious nodule replacing/enlarging the right  thyroid lobe (TR4), previously 4.1 cm on 4/6/2017. Tissue sampling  recommended.   2.  Slightly increased 1.6 cm right inferior lobe thyroid nodule  (TR2), previously 1.3 cm on 4/6/2017.     ACR TI-RADS recommendations  TR2 (2 points) & TR1 (0 points) -No FNA or follow-up. (Approximate  risk of malignancy-2% or less)  TR3 (3 points) - FNA if ? 2.5cm, follow-up if 1.5 -2.4 cm in 1, 3 and  5 years. (Approximate risk of malignancy-2.1-5%)  TR4 (4-6 points) - FNA if ? 1.5cm, follow-up if 1 -1.4 cm in 1, 2, 3  and 5 years. (Approximate risk of malignancy-5.1-20 %)  TR5 (?7 points) - FNA if ? 1cm, follow-up if 0.5 -0.9 cm every year  for 5 years. (Approximate risk of malignancy-more than 20%)     I have personally reviewed the examination and initial interpretation  and I agree with the findings.     CARLOS ENRIQUE PORTER, DO     EXAM: CT SOFT TISSUE NECK W CONTRAST  6/6/2023 5:05 PM      HISTORY:  papillary thyroid cancer. Please complete CT neck with  contrast to evaluate; Malignant neoplasm of thyroid gland (H)         COMPARISON:  Thyroid ultrasound 5/19/2023. Chest CT 3/3/2023.      TECHNIQUE: Following intravenous administration of nonionic iodinated  contrast medium, thin section helical CT images were obtained from the  skull base down to the level of the aortic arch.  Axial, coronal and  sagittal reformations were performed with 2-3 mm slice thickness  reconstruction. Images were reviewed in soft tissue, lung and bone  windows.     CONTRAST: ISOVUE 370 90cc     FINDINGS:   No nasopharyngeal, oropharyngeal, hypopharyngeal, or glottic mucosal  space abnormality. Normal tongue base.  Salivary glands are within  normal limits.     No lymphadenopathy.     Heterogeneous right thyroid mass measuring 4.5 x 5.5 x 5.1 cm  corresponding to the mass which underwent a recent FNA on 5/25/2023.  Associated mass effect with leftward shift of the trachea and moderate  narrowing of the trachea.     Patent cervical vasculature; no high grade arterial stenosis.     No suspicious osseus lesion. No high grade spinal canal stenosis.     Carious right maxillary molar. The imaged skull base, intracranial and  orbital structures are within normal limits.     Clear lung apices.                                                                      IMPRESSION:   1. Right thyroid mass consistent with known papillary thyroid  carcinoma. Associated local mass effect with moderate narrowing of the  trachea.  2. No cervical adenopathy.     LORIE SWIFT MD       Examination: CT CHEST W/O CONTRAST, 6/26/2023 10:39 AM      History: Malignant neoplasm of thyroid gland (H)     Comparison: 3/3/2023 chest CT, 6/26/2023 soft tissue neck CT     TECHNIQUE: Helical acquisition of CT images from the lung apices to  the kidneys without IV contrast. Coronal and axial MIP images were  reconstructed from the source data.     FINDINGS:      Lungs:  No pleural effusion or pneumothorax. No focal consolidation. Bilateral  dependent subsegmental atelectasis. No suspicious pulmonary nodule.  The central tracheobronchial tree is clear. No bronchial wall  thickening or bronchiectasis.     Chest:   Normal heart size. No pericardial effusion. Normal caliber ascending  aorta and main pulmonary artery. No thoracic lymphadenopathy. Stable  heterogeneous enlargement of the right thyroid with narrowing and  leftward deviation of the trachea     Upper abdomen: Limited evaluation of the upper abdomen.      Bones: No acute or aggressive osseus abnormality.                                                                      IMPRESSION:   1. No evidence of  metastatic disease in the chest.  2. Continued masslike enlargement of the right thyroid lobe with  narrowing and leftward deviation of the trachea.   CARLOS ENRIQUE PORTER DO       SYSTEM ID:  [id number]

## 2023-09-28 ENCOUNTER — TELEPHONE (OUTPATIENT)
Dept: ENDOCRINOLOGY | Facility: CLINIC | Age: 38
End: 2023-09-28
Payer: COMMERCIAL

## 2023-09-28 DIAGNOSIS — E89.0 POSTOPERATIVE HYPOTHYROIDISM: ICD-10-CM

## 2023-09-28 DIAGNOSIS — C73 PAPILLARY THYROID CARCINOMA (H): ICD-10-CM

## 2023-09-28 NOTE — TELEPHONE ENCOUNTER
Did you mean Monday- Friday 1 tablet and Saturday and Sunday  1.5 ? Pharmacy declines to fill  until changed. Marilyn Arguello RN on 9/28/2023 at 12:18 PM

## 2023-09-28 NOTE — TELEPHONE ENCOUNTER
[facility] Call Center    Phone Message    May a detailed message be left on voicemail: yes     Reason for Call: Other: Caller states that they are unable to  RX due to the Instructions, quantity and frequency and not matching up on the RX please contact pharmacy asap .        evothyroxine (SYNTHROID/LEVOTHROID) 112 MCG tablet     [id number] - [place], [place] - [address] AT [facility]     Action Taken: Other: ENDO    Travel Screening: Not Applicable

## 2023-09-29 DIAGNOSIS — E89.0 POSTOPERATIVE HYPOTHYROIDISM: ICD-10-CM

## 2023-09-29 DIAGNOSIS — C73 PAPILLARY THYROID CARCINOMA (H): Primary | ICD-10-CM

## 2023-09-29 DIAGNOSIS — C73 PAPILLARY THYROID CARCINOMA (H): ICD-10-CM

## 2023-09-29 LAB — MAYO MISC RESULT: NORMAL

## 2023-09-29 RX ORDER — LEVOTHYROXINE SODIUM 112 UG/1
TABLET ORAL
Qty: 96 TABLET | Refills: 4 | Status: SHIPPED | OUTPATIENT
Start: 2023-09-29 | End: 2023-09-29

## 2023-09-29 RX ORDER — LEVOTHYROXINE SODIUM 112 UG/1
TABLET ORAL
Qty: 96 TABLET | Refills: 4 | Status: SHIPPED | OUTPATIENT
Start: 2023-09-29 | End: 2023-11-18

## 2023-09-29 NOTE — TELEPHONE ENCOUNTER
The direction confuse the pharmacy. You have take 1 Monday - Sat then Saturday and Sunday  1.5 ...Saturday twice with 2 different doses  ...so as is is Sat 2.5 tablets ? Marilyn Arguello RN on 9/29/2023 at 1:13 PM

## 2023-09-29 NOTE — TELEPHONE ENCOUNTER
Yes, I meant that.  I changed it after the appt after I saw the lab results . I have changed the quantity if that is their issue.  Leslie Galvez MD

## 2023-10-02 NOTE — TELEPHONE ENCOUNTER
Correction written and confirmed.   Lis Mueller, RN on 10/2/2023 at 8:07 AM     RE      Outpatient Medication Detail     Disp Refills Start End [facility]   levothyroxine (SYNTHROID/LEVOTHROID) 112 MCG tablet 96 tablet 4 9/29/2023  No   Sig: MON to FRI 1 tablet/day; SAT and SUN 1.5 tablet /day   Sent to pharmacy as: Levothyroxine Sodium 112 MCG Oral Tablet (SYNTHROID/LEVOTHROID)   Class: E-Prescribe   Notes to Pharmacy: I have written this Rx multiple times today yet it is not on our med list so I am writing it again .   Order: [id number]   E-Prescribing Status: Receipt confirmed by pharmacy (9/29/2023  4:12 PM CDT)     Printout Tracking    External Result Report     Medication Administration Instructions    MON to FRI 1 tablet/day; SAT and SUN 1.5 tablet /day     Pharmacy    [facility] #[id number] - [place], [place] - [address] AT [address]

## 2023-10-04 LAB — SCANNED LAB RESULT: NORMAL

## 2023-10-11 ENCOUNTER — TELEPHONE (OUTPATIENT)
Dept: FAMILY MEDICINE | Facility: CLINIC | Age: 38
End: 2023-10-11
Payer: COMMERCIAL

## 2023-10-18 ENCOUNTER — OFFICE VISIT (OUTPATIENT)
Dept: FAMILY MEDICINE | Facility: CLINIC | Age: 38
End: 2023-10-18
Payer: COMMERCIAL

## 2023-10-18 VITALS
HEIGHT: 64 IN | SYSTOLIC BLOOD PRESSURE: 122 MMHG | TEMPERATURE: 98.2 F | BODY MASS INDEX: 27.21 KG/M2 | HEART RATE: 81 BPM | RESPIRATION RATE: 18 BRPM | DIASTOLIC BLOOD PRESSURE: 80 MMHG | WEIGHT: 159.4 LBS | OXYGEN SATURATION: 98 %

## 2023-10-18 DIAGNOSIS — C73 PAPILLARY THYROID CARCINOMA (H): Primary | ICD-10-CM

## 2023-10-18 DIAGNOSIS — E89.0 POSTOPERATIVE HYPOTHYROIDISM: ICD-10-CM

## 2023-10-18 PROCEDURE — 99213 OFFICE O/P EST LOW 20 MIN: CPT | Performed by: NURSE PRACTITIONER

## 2023-10-18 NOTE — PROGRESS NOTES
"  Assessment & Plan     Papillary thyroid carcinoma (H)  BRAF [id number] mutation positive papillary thyroid carcinoma 5.1 cm, replacing right thyroid lobe and grossly invading tracheal cartilage, right RLN, tracheal submucosa and skeletal muscle; + Angiolymphatic and perineural invasion; +1/1 LN involved with PTC (1 mm)  pT4a, pN1a, cMx  MACIS 5.73 assuming complete resection and no distant mets .  DONTAE recurrent risk high     Endo Recommend radioactive iodine after we have confirmed the iodine exposure has cleared.  We may need to work around his travel to [facility] in December.  Arrangements will need to take into account the 6 year old child at home.  Radioiodine has been shown to improve survival and decrease recurrence in individuals with pT4 disease.     Pt continues with post surgical and hypothyroidism symptoms of chronic fatigue and hoarse voice  Continue thyroid - synthroid- replacement as prescribed by endo  Follow up with endo for plan of care and possible BARNARD treatment - to be discussed Nov 6th 2023  Continue gradual return to activity with goal of 5 hours of physical activity 11/13/23    Postoperative hypothyroidism  See above                BMI:   Estimated body mass index is 27.36 kg/m  as calculated from the following:    Height as of this encounter: 1.626 m (5' 4\").    Weight as of this encounter: 72.3 kg (159 lb 6.4 oz).   Weight management plan: Discussed healthy diet and exercise guidelines        MANSOOR Urbina [facility]              Malena   Leonid is a 38 year old, presenting for the following health issues:  Follow Up        10/18/2023     2:19 PM   Additional Questions   Roomed by radha-lpn   Accompanied by wife         10/18/2023     2:19 PM   Patient Reported Additional Medications   Patient reports taking the following new medications na       History of Present Illness       Reason for visit:  Follow up    He eats 2-3 servings of fruits and vegetables " "daily.He consumes 1 sweetened beverage(s) daily.He exercises with enough effort to increase his heart rate 20 to 29 minutes per day.  He exercises with enough effort to increase his heart rate 3 or less days per week.   He is taking medications regularly.     Has had total thyroidectomy  Has appointment with endocrinologist on 11/6/23 to discuss lab work and next steps regarding cancer treatment - considering Iodine treatment now or wait until next year     Slowly starting to exercise again - unable to tolerate walking for longer than 20 mins at a time parker slow pace - has been do trials of period of time on a treadmill.    Overall feeling very weak, taking naps every other day at this time, feeling fatigued daily, sitting the majority of the day.     No memory changes of cognition changes at this time. Warmer climate causes him to feel more fatigue.     Currently taking levothyroxine 112mcg, and taking 1.5 tabs on Saturdays and Sundays, and one tab daily for all other days.    No longer having episode of fast heart rate.     and SLP on 10/26/23    Father Is not well - live sin [place] - going there for two weeks in December    Iodinated contrast exposure 6/6/2023.  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   He has low volume voice and some symptoms.       Typical work day is 3pm - 1:30am, works Monday - Thursday     Has a 10 pound weight gain since after surgery    Review of Systems   Constitutional, HEENT, cardiovascular, pulmonary, gi and gu systems are negative, except as otherwise noted.      Objective    /80 (BP Location: Right arm, Patient Position: Sitting, Cuff Size: Adult Regular)   Pulse 81   Temp 98.2  F (36.8  C) (Oral)   Resp 18   Ht 1.626 m (5' 4\")   Wt 72.3 kg (159 lb 6.4 oz)   SpO2 98%   BMI 27.36 kg/m    Body mass index is 27.36 kg/m .  Physical Exam   GENERAL: alert " and no distress, hmong speaking patient -hushed/raspy voice  NECK: no adenopathy, no asymmetry, masses, surgical scar well approximated  RESP: lungs clear to auscultation - no rales, rhonchi or wheezes  CV: regular rate and rhythm, normal S1 S2, no S3 or S4, no murmur, click or rub, no peripheral edema and peripheral pulses strong  MS: no gross musculoskeletal defects noted, no edema

## 2023-10-18 NOTE — LETTER
November 13, 2023      Leonid Mcmillan  [address] [place] [address]        To Whom It May Concern:    Leonid Mcmillan was seen in our clinic 10/18/23. He may return to work with the following: must be able to take a break for 5 mins every 1-2 hrs and max working for 5 hours/day for 4 weeks and increase to full time as tolerated after this.        Continue gradual return to activity with goal of 5 hours of physical activity 11/13/23       Sincerely,        MANSOOR Urbina CNP

## 2023-10-26 ENCOUNTER — OFFICE VISIT (OUTPATIENT)
Dept: OTOLARYNGOLOGY | Facility: CLINIC | Age: 38
End: 2023-10-26
Payer: COMMERCIAL

## 2023-10-26 ENCOUNTER — PRE VISIT (OUTPATIENT)
Dept: OTOLARYNGOLOGY | Facility: CLINIC | Age: 38
End: 2023-10-26

## 2023-10-26 ENCOUNTER — THERAPY VISIT (OUTPATIENT)
Dept: SPEECH THERAPY | Facility: CLINIC | Age: 38
End: 2023-10-26
Payer: COMMERCIAL

## 2023-10-26 VITALS — DIASTOLIC BLOOD PRESSURE: 96 MMHG | SYSTOLIC BLOOD PRESSURE: 124 MMHG | HEART RATE: 63 BPM

## 2023-10-26 DIAGNOSIS — J38.01 VOCAL FOLD PARALYSIS, RIGHT: ICD-10-CM

## 2023-10-26 DIAGNOSIS — R49.0 DYSPHONIA: Primary | ICD-10-CM

## 2023-10-26 DIAGNOSIS — R13.12 OROPHARYNGEAL DYSPHAGIA: ICD-10-CM

## 2023-10-26 DIAGNOSIS — E04.1 THYROID NODULE: Primary | ICD-10-CM

## 2023-10-26 DIAGNOSIS — C73 THYROID CANCER (H): Primary | ICD-10-CM

## 2023-10-26 PROCEDURE — 92612 ENDOSCOPY SWALLOW (FEES) VID: CPT | Mod: GN | Performed by: OTOLARYNGOLOGY

## 2023-10-26 PROCEDURE — 92613 ENDOSCOPY SWALLOW (FEES) I&R: CPT | Performed by: OTOLARYNGOLOGY

## 2023-10-26 PROCEDURE — 31574 LARGSC W/NJX AUGMENTATION: CPT | Mod: RT | Performed by: OTOLARYNGOLOGY

## 2023-10-26 PROCEDURE — 92524 BEHAVRAL QUALIT ANALYS VOICE: CPT | Mod: GN | Performed by: SPEECH-LANGUAGE PATHOLOGIST

## 2023-10-26 PROCEDURE — 99215 OFFICE O/P EST HI 40 MIN: CPT | Mod: 25 | Performed by: OTOLARYNGOLOGY

## 2023-10-26 PROCEDURE — L8607 INJ VOCAL CORD BULKING AGENT: HCPCS | Performed by: OTOLARYNGOLOGY

## 2023-10-26 PROCEDURE — 92526 ORAL FUNCTION THERAPY: CPT | Mod: GN | Performed by: SPEECH-LANGUAGE PATHOLOGIST

## 2023-10-26 ASSESSMENT — PAIN SCALES - GENERAL: PAINLEVEL: NO PAIN (0)

## 2023-10-26 NOTE — LETTER
10/26/2023       RE: Leonid Mcmillan  [address] [place] [address]     Dear Colleague,    Thank you for referring your patient, Leonid Mcmillan, to the [facility] EAR [facility]SE [facility] [place] at [facility]. Please see a copy of my visit note below.        [facility]   at the [facility]   Otolaryngology Clinic     Patient: Leonid Mcmillan    MRN: [id number]    : 1985    Age/Gender: 38 year old male  Date of Service: 10/26/2023  Rendering Provider:   Tessy Dale MD     Referring Provider   PCP: Fabiola Kent  Referring Physician: Patricia Cardenas MD  No address on file  Reason for Consultation   Right vocal cord paralysis  Dysphonia   History   HISTORY OF PRESENT ILLNESS: Mr. Mcmillan is a 38 year old male who presents to us today with right vocal cord paralysis.      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 He will be undergoing adjuvant radiation therapy.     He presents today for evaluation. He reports:    Dysphonia  - hard to breath when talking  - voice demand is mild at his work     Dyspnea  - hard to breath walking up the stairs       PAST MEDICAL HISTORY:   Past Medical History:   Diagnosis Date    Infection due to 2019 novel coronavirus 2023    Papillary thyroid carcinoma (H) 2023    Postoperative hypothyroidism 2023    Thyroid mass 2014       PAST SURGICAL HISTORY:   Past Surgical History:   Procedure Laterality Date    BRONCHOSCOPY FLEXIBLE AND RIGID N/A 2023    Procedure: Flexible bronchoscopy, direct laryngoscopy;  Surgeon: Antonio Morales MD;  Location: [facility] OR    ESOPHAGOSCOPY N/A 2023    Procedure: ESOPHAGOSCOPY;  Surgeon: Antonio Morales MD;  Location: [facility]    ESOPHAGOSCOPY N/A 2023    Procedure: Esophagoscopy;  Surgeon: Antonio Morales  MD Griffin;  Location: [facility] OR    LARYNGOSCOPY, BRONCHOSCOPY, COMBINED N/A 8/24/2023    Procedure: direct laryngoscopy, flexible Bronchoscopy;  Surgeon: Antonio Morales MD;  Location: [facility] OR    RESECT TRACHEA WITH RECONSTRUCTION N/A 8/24/2023    Procedure: tracheal resection;  Surgeon: Pieter Ballard MD;  Location: [facility] OR    SHOULDER SURGERY Right 2005    shoulder injury, did not have general anesthesia    THYROIDECTOMY N/A 8/24/2023    Procedure: total thyroidectomy;  Surgeon: Antonio Morales MD;  Location: [facility] OR       CURRENT MEDICATIONS:   Current Outpatient Medications:     levothyroxine (SYNTHROID/LEVOTHROID) 112 MCG tablet, MON to FRI 1 tablet/day; SAT and SUN 1.5 tablet /day, Disp: 96 tablet, Rfl: 4    ALLERGIES: Patient has no known allergies.    SOCIAL HISTORY:    Social History     Socioeconomic History    Marital status:      Spouse name: Not on file    Number of children: Not on file    Years of education: Not on file    Highest education level: Not on file   Occupational History    Not on file   Tobacco Use    Smoking status: Never     Passive exposure: Never    Smokeless tobacco: Never   Vaping Use    Vaping Use: Never used   Substance and Sexual Activity    Alcohol use: Never    Drug use: Never    Sexual activity: Not on file   Other Topics Concern    Not on file   Social History Narrative    Moved here in 2014     Social Determinants of Health     Financial Resource Strain: Low Risk  (10/18/2023)    Financial Resource Strain     Within the past 12 months, have you or your family members you live with been unable to get utilities (heat, electricity) when it was really needed?: No   Food Insecurity: Low Risk  (10/18/2023)    Food Insecurity     Within the past 12 months, did you worry that your food would run out before you got money to buy more?: No     Within the past 12 months, did the food you bought just not last and you didn t have money to get more?: No   Transportation Needs:  Low Risk  (10/18/2023)    Transportation Needs     Within the past 12 months, has lack of transportation kept you from medical appointments, getting your medicines, non-medical meetings or appointments, work, or from getting things that you need?: No   Physical Activity: Not on file   Stress: Not on file   Social Connections: Not on file   Interpersonal Safety: Low Risk  (10/18/2023)    Interpersonal Safety     Do you feel physically and emotionally safe where you currently live?: Yes     Within the past 12 months, have you been hit, slapped, kicked or otherwise physically hurt by someone?: No     Within the past 12 months, have you been humiliated or emotionally abused in other ways by your partner or ex-partner?: No   Housing Stability: Low Risk  (10/18/2023)    Housing Stability     Do you have housing? : Yes     Are you worried about losing your housing?: No         FAMILY HISTORY:   Family History   Problem Relation Age of Onset    Diabetes Father     Anesthesia Reaction No family hx of     Venous thrombosis No family hx of     Myocardial Infarction No family hx of     Cerebrovascular Disease No family hx of     Thyroid Cancer No family hx of     Goiter No family hx of     Cancer No family hx of      Non-contributory for problems with anesthesia    REVIEW OF SYSTEMS:   The patient was asked a 14 point review of systems regarding constitutional symptoms, eye symptoms, ears, nose, mouth, throat symptoms, cardiovascular symptoms, respiratory symptoms, gastrointestinal symptoms, genitourinary symptoms, musculoskeletal symptoms, integumentary symptoms, neurological symptoms, psychiatric symptoms, endocrine symptoms, hematologic/lymphatic symptoms, and allergic/ immunologic symptoms.   The pertinent factors have been included in the HPI and below.  Patient Supplied Answers to Review of Systems      - No data to display                Physical Examination   The patient underwent a physical examination as described  below. The pertinent positive and negative findings are summarized after the description of the examination.  Constitutional: The patient's developmental and nutritional status was assessed. The patient's voice quality was assessed.  Head and Face: The head and face were inspected for deformities. The sinuses were palpated. The salivary glands were palpated. Facial muscle strength was assessed bilaterally.  Eyes: Extraocular movements and primary gaze alignment were assessed.  Ears, Nose, Mouth and Throat: The ears and nose were examined for deformities. The nasal septum, mucosa, and turbinates were inspected by anterior rhinoscopy. The lips, teeth, and gums were examined for abnormalities. The oral mucosa, tongue, palate, tonsils, lateral and posterior pharynx were inspected for the presence of asymmetry or mucosal lesions.    Neck: The tracheal position was noted, and the neck mass palpated to determine if there were any asymmetries, abnormal neck masses, thyromegally, or thyroid nodules.  Respiratory: The nature of the breathing and chest expansion/symmetry was observed.  Cardiovascular: The patient was examined to determine the presence of any edema or jugular venous distension.  Abdomen: The contour of the abdomen was noted.  Lymphatic: The patient was examined for infraclavicular lymphadenopathy.  Musculoskeletal: The patient was inspected for the presence of skeletal deformities.  Extremities: The extremities were examined for any clubbing or cyanosis.  Skin: The skin was examined for inflammatory or neoplastic conditions.  Neurologic: The patient's orientation, mood, and affect were noted. The cranial nerve  functions were examined.  Other pertinent positive and negative findings on physical examination:   OC/OP: no lesions, uvula midline, soft palate elevates symmetrically   Neck: no lesions, no TH tenderness to palpation, midline neck incision well healed  All other physical examination findings were  within normal limits and noncontributory.  Procedures   Flexible laryngoscopy (CPT 62449)      Pre-procedure diagnosis: dysphonia  Post-procedure diagnosis: same as above  Indication for procedure: Mr. Mcmillan is a 38 year old male with see above  Procedure(s): Fiberoptic Laryngoscopy    Details of Procedure: After informed consent was obtained, the patient was seated in the examination chair.  The areas of the nasopharynx as well as the hypopharynx were anesthetized with topical 4% lidocaine with 0.25% phenylephrine atomizer.  Examination of the base of tongue was performed first.  Attention was directed to any evidence of masses in the area or evidence of leukoplakia or candidal infection.  Attention was directed to the epiglottis where its size and position was determined and its movement on phonation of the vowel  e .  The piriform sinuses were then inspected for any mass lesions or pooling of secretions.  Attention was then directed to the larynx. The vocal folds were inspected for infection or any areas of leukoplakia, for masses, polypoid degeneration, or hemorrhage.  Having done this, the arytenoids and vocal processes were inspected for erythema or evidence of granuloma formation.  The posterior commissure was then inspected for evidence of inflammatory changes in the mucosa and heaping up of mucosal tissue. The patient was then instructed to say the vowel  e .  Adduction of vocal folds to the midline was observed for any evidence of paresis or paralysis of the larynx or asymmetry in rotation of the larynx to the left or right. The patient was asked to breathe and the degree of abduction was noted bilaterally.  Subglottic view of the larynx was obtained for any additional mass lesions or mucosal changes.  Finally the post cricoid was examined for evidence of pooling of secretions, as well as the pharyngeal wall mucosa.   Anesthesia type: 0.25% phenylephrine    Findings:  Anatomic/physiological deviations: RNC,  right vocal fold paralysis, glottal gap, well healed CTR Incision   Right vocal process: Marked restriction of mobility   Left vocal process: No restriction of mobility  Glottal gap: Glottal gap  Supraglottic structures: Normal  Hypopharynx: Normal     Estimated Blood Loss: minimal  Complications: None  Disposition: Patient tolerated the procedure well          Fiberoptic Endoscopic Evaluation of Swallowing (CPT 93815)  and Interpretation of Swallowing (CPT 71703)    Indications: See above notes for complete history and physical. Patient complains of dysphagia to both solids and liquids and/or there is suggestion on history and endoscopic exam of the presence of dysphagia causing medical complaints.  Swallowing evaluation is being performed to assess the presence and degree of dysphagia, and to recommend a safe diet.     Pulmonary Status:  No PNA   Current Diet:              regular                                        thin liquids      Consistency Amounts:  Thin Liquid: sip   Puree: sip  Solid: cookies            Positioning: upright in a chair  Oral Peripheral Exam: See physical exam section.  Anatomic Notes: See Videostroboscopy report for assessment of anatomy and laryngeal functioning  Pharyngeal secretions prior to administration of liquid or food: No   Oral Phase Abnormal Findings: No abnormal behavior observed   Pharyngeal Phase Abnormal Findings: penetration with thins     Recommended Diet:  regular                                        thin liquids           Laryngoscopy with Injection Augmentation (CPT 15804)    Pre-procedure diagnosis: Glottic insufficiency  Post-procedure diagnosis: Same  Indication for procedure: Mr. Mcmillan is a 38 year old male with right vocal cord paralysis  Procedure(s): Fiberoptic Laryngoscopy with Injection of 1.0 cc of Voice Gel into the Right vocal fold for augmentation.  Details of Procedure: Informed consent was obtained and the patient was told that there is a small  possibility of airway obstruction following this procedure and that occasionally bleeding ensues, which can lead to bleeding into the cord, with the possibility of terminating the procedure prematurely.  The patient was also instructed that their voice might be tight for a day or two, due to the water content of the injected material. Then, the patient was seated upright in the chair. The areas of the nasopharynx as well as the hypopharynx were anesthetized with topical 4% lidocaine with 0.25% phenylephrine atomizer. The scope was then passed in the right or left nasal cavity, depending upon which side had the greater opening, and passed in through middle or the inferior meatus. Upon reaching the nasopharynx, the patient was instructed to breathe through the nasal cavity and the scope was passed inferiorly into the hypopharynx until the epiglottis was visualized. The scope was then passed beyond the epiglottis and both the brightness and focus were readjusted for maximum resolution. The vocal cords were visualized and the larynx was then carefully identified both for vocal cord insufficiency and/or paralysis. An assessment of glottic closure was made. The chin of the patient was moved up and down to identify external landmarks including the cricoid, cricothyroid membrane, superior notch of the thyroid cartilage, and the hyoid bone. These landmarks were palpated and, based upon this, a decision was made as to the position of the vocal cords in relationship to external landmarks. A 27-gauge needle was then secured to the augmentation syringe and air was removed from the needle. The needle was passed transcartilagenous or transmembranous into the vocal fold. The needle tips were then observed on the video monitor, care being taken not to perforate the membranous vocal cord. After correct needle placement was confirmed on the video monitor, augmentation agent was injected until the vocal cords were able to achieve  optimal closure during adduction. During injection the patient s airway was carefully observed to maintain at least a 4 mm airway during injection. The injection was performed in 0.1 ml aliquots, the patient was asked to cough and to phonate  e  after each aliquot to dynamically assess the anatomic and acoustic effects of the injection. The vocal fold was over-injected by approximately 0.1 mg to account for resorption of the water content. Following maximal augmentation, the scope was withdrawn atraumatically.   Anesthesia type: none    Findings:   Approximately 1.0 cc of Voice Gel implant was injected   Anatomic/physiological deviations: RNC, transthyroid cartilage approach    Right vocal process: No restriction of mobility   Left vocal process: No restriction of mobility  Glottal gap: Complete glottal closure  Supraglottic structures: Normal  Hypopharynx: Normal    Estimated Blood Loss: minimal  Complications: None  Disposition: Patient tolerated the procedure well    Review of Relevant Clinical Data   I personally reviewed:  Notes:   Antonio Morales MD - ENT 09/11/23  Diagnosis: T4aN1a classic papillary thyroid cancer right lobe (transmural tracheal invasion)     Treatment: Total thyroidectomy, sacrifice of the right recurrent laryngeal nerve, tracheal resection 8/24/23     Post op PTH normal     Pathology:  5.1 cm classic PTC  Unencapsulated  Extends throguh trachea cartilage, involves right recurrent laryngeal nerve  +LVI, +PNI  Margins negative  One perithyroid lymph node with PTC  BRAF V600E +     Interval history:  He has no complaints today.  He has had some intermittent palpitations.  He is tolerating an oral diet.  No hypocalcemia symptoms.  No respiratory complaints.     Assessment and plan:  Mr. Montes is recovering well from his surgery.  I reviewed the pathology results with him and his family.  He will need adjuvant radioactive iodine.  I am not sure of the role of external beam radiation therapy in this  situation.  I will discuss this with my colleagues and also discuss this at our multidisciplinary head and neck conference.    Radiology:     CT Neck 6/6/23  IMPRESSION:   1. Right thyroid mass consistent with known papillary thyroid  carcinoma. Associated local mass effect with moderate narrowing of the  trachea.  2. No cervical adenopathy.    US Head Neck   IMPRESSION:  Soft tissue neck ultrasound with lymph node measurements as described  above. No suspicious lymph nodes.    US Thyroid 5/19/23  Impression:  1.  Increased 6.1 cm suspicious nodule replacing/enlarging the right  thyroid lobe (TR4), previously 4.1 cm on 4/6/2017. Tissue sampling  recommended.   2.  Slightly increased 1.6 cm right inferior lobe thyroid nodule  (TR2), previously 1.3 cm on 4/6/2017.    CT Chest 3/3/23  IMPRESSION:  1.  Markedly enlarged right lobe of the thyroid with tracheal deviation to the left of midline. Further evaluation with thyroid ultrasound suggested.  2.  Minimal intraluminal, tracheal component on the right where trachea is deviated most likely reflects adherent secretions rather than a polyp or an infiltrative component.  3.  No other abnormalities are seen to explain hemoptysis.  4.  No evidence of pulmonary emboli.    XR Chest 3/3/23  IMPRESSION: Lungs are clear. Heart and pulmonary vascularity are normal. No signs of acute disease    Pathology:   Final Diagnosis 8/24/23  A. EVALUATE FOR RIGHT INFERIOR PARATHYROID, BIOPSY:  -Benign parathyroid tissue.     B. THYROID GLAND, TOTAL THYROIDECTOMY WITH TRACHEAL RESECTION:  -PAPILLARY THYROID CARCINOMA, classic/conventional type, 5.1 cm in greatest dimension.  -Tumor entirely replaces right thyroid lobe, it is unencapsulated, extends through tracheal cartilage, and involves right recurrent laryngeal nerve, tracheal submucosa and skeletal muscle.  -Angiolymphatic and perineural invasion are present.  -Margins are negative, including superior and inferior tracheal margins;  "closest uninvolved margin is anterior right lobe at 1 mm.  -BRAF V600E immunohistochemistry test is POSITIVE.  -One perithyroid lymph node focally involved by metastatic carcinoma, less than 1 mm (1/1).  -Isthmus and left thyroid lobe are negative for malignancy.  -AJCC pathologic staging is pT4a N1a.  -See synoptic report.     C. INFERIOR TRACHEAL WALL, EXCISION:  -Tracheal wall, negative for malignancy.          Labs:  Lab Results   Component Value Date    TSH 11.87 (H) 09/27/2023     Lab Results   Component Value Date     08/27/2023    CO2 21 (L) 08/27/2023    BUN 6.4 08/27/2023    PHOS 4.3 09/27/2023     Lab Results   Component Value Date    WBC 5.2 08/27/2023    HGB 13.1 (L) 08/27/2023    HCT 39.5 (L) 08/27/2023    MCV 88 08/27/2023     08/27/2023     Lab Results   Component Value Date    PTT 29 03/03/2023    INR 1.06 03/03/2023     No results found for: \"BERLIN\"  No components found for: \"RHEUMATOIDFACTOR\", \"RF\"  No results found for: \"CRP\"  No components found for: \"CKTOT\", \"URICACID\"  No components found for: \"C3\", \"C4\", \"DSDNAAB\", \"NDNAABIFA\"  No results found for: \"MPOAB\"    Patient reported Quality of Life (QOL) Measures   Patient Supplied Answers To VHI Questionnaire      - No data to display                  Patient Supplied Answers To EAT Questionnaire      - No data to display                  Patient Supplied Answers To CSI Questionnaire      - No data to display                  Patient Supplied Answers to Dyspnea Index Questionnaire:      - No data to display                Impression & Plan     IMPRESSION: Mr. Mcmillan is a 38 year old male who is being seen for the following:    Dysphonia  - ongoing for 2 months  - stable  - in the setting of history of total thyroidectomy with tracheal resection on 8/24/23  - speaking voice is affected   - singing voice is affected  - no pain with voice use  - voice demand is low  - scope evaluation shows right vocal fold paralysis with glottal gap  - FEES " shows penetration with thins   - discussed etiology of vocal fold paralysis in this case is due to right vocal fold paralysis and CTR  - discussed prognosis of vocal fold paralysis is permanent   - discussed options of treatment which include observation, voice therapy, temporary augmentation vs long term options  - discussed long term options of vocal fold augmentation with injection laryngoplasty with CAHA vs lipoinjection, framework surgery and reinnervation  - given he is so recent from his CTR recommend voice gel injection laryngoplasty to see if voice is improved and if this helps then will need a longer term filler   - a permanent option will be offered next summer once he is 1 year from his CTR  - discussed his pitch will always be low   - right vocal fold injection was performed today without complication  Plan  - observation  - continue swallow precautions with thins    RETURN VISIT: in 3-4 weeks    Thank you for the kind referral and for allowing me to share in the care of Mr. Mcmillan. If you have any questions, please do not hesitate to contact me.    Scribe Disclosure:   I, MARTY RAMOS, am serving as a scribe; to document services personally performed by Tessy Dale MD -based on data collection and the provider's statements to me.     Provider Disclosure:  I agree with above History, Review of Systems, Physical exam and Plan.  I have reviewed the content of the documentation and have edited it as needed. I have personally performed the services documented here and the documentation accurately represents those services and the decisions I have made.      Electronically signed by:    Tessy Dale MD    Laryngology    [facility]  Department of  Otolaryngology - Head and Neck Surgery  Clinics & Surgery Center  [address] [place] [address]  Appointment line: [phone]  Fax: [phone]  [email or web address]      Again,  thank you for allowing me to participate in the care of your patient.      Sincerely,    Tessy Dale MD

## 2023-10-26 NOTE — PROGRESS NOTES
[facility]   at the [facility]   Otolaryngology Clinic     Patient: Leonid Mcmillan    MRN: [id number]    : 1985    Age/Gender: 38 year old male  Date of Service: 10/26/2023  Rendering Provider:   Tessy Dale MD     Referring Provider   PCP: Fabiola Kent  Referring Physician: Patricia Cardenas MD  No address on file  Reason for Consultation   Right vocal cord paralysis  Dysphonia   History   HISTORY OF PRESENT ILLNESS: Mr. Mcmillan is a 38 year old male who presents to us today with right vocal cord paralysis.      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 He will be undergoing adjuvant radiation therapy.     He presents today for evaluation. He reports:    Dysphonia  - hard to breath when talking  - voice demand is mild at his work     Dyspnea  - hard to breath walking up the stairs       PAST MEDICAL HISTORY:   Past Medical History:   Diagnosis Date    Infection due to 2019 novel coronavirus 2023    Papillary thyroid carcinoma (H) 2023    Postoperative hypothyroidism 2023    Thyroid mass 2014       PAST SURGICAL HISTORY:   Past Surgical History:   Procedure Laterality Date    BRONCHOSCOPY FLEXIBLE AND RIGID N/A 2023    Procedure: Flexible bronchoscopy, direct laryngoscopy;  Surgeon: Antonio Morales MD;  Location: [facility] OR    ESOPHAGOSCOPY N/A 2023    Procedure: ESOPHAGOSCOPY;  Surgeon: Antonio Morales MD;  Location: [facility]    ESOPHAGOSCOPY N/A 2023    Procedure: Esophagoscopy;  Surgeon: Antonio Morales MD;  Location: [facility]    LARYNGOSCOPY, BRONCHOSCOPY, COMBINED N/A 2023    Procedure: direct laryngoscopy, flexible Bronchoscopy;  Surgeon: Antonio Mroales MD;  Location: [facility] OR    RESECT TRACHEA WITH RECONSTRUCTION N/A 2023    Procedure: tracheal resection;  Surgeon: Elio  MD Pieter;  Location: [facility] OR    SHOULDER SURGERY Right 2005    shoulder injury, did not have general anesthesia    THYROIDECTOMY N/A 8/24/2023    Procedure: total thyroidectomy;  Surgeon: Antonio Morales MD;  Location: [facility] OR       CURRENT MEDICATIONS:   Current Outpatient Medications:     levothyroxine (SYNTHROID/LEVOTHROID) 112 MCG tablet, MON to FRI 1 tablet/day; SAT and SUN 1.5 tablet /day, Disp: 96 tablet, Rfl: 4    ALLERGIES: Patient has no known allergies.    SOCIAL HISTORY:    Social History     Socioeconomic History    Marital status:      Spouse name: Not on file    Number of children: Not on file    Years of education: Not on file    Highest education level: Not on file   Occupational History    Not on file   Tobacco Use    Smoking status: Never     Passive exposure: Never    Smokeless tobacco: Never   Vaping Use    Vaping Use: Never used   Substance and Sexual Activity    Alcohol use: Never    Drug use: Never    Sexual activity: Not on file   Other Topics Concern    Not on file   Social History Narrative    Moved here in 2014     Social Determinants of Health     Financial Resource Strain: Low Risk  (10/18/2023)    Financial Resource Strain     Within the past 12 months, have you or your family members you live with been unable to get utilities (heat, electricity) when it was really needed?: No   Food Insecurity: Low Risk  (10/18/2023)    Food Insecurity     Within the past 12 months, did you worry that your food would run out before you got money to buy more?: No     Within the past 12 months, did the food you bought just not last and you didn t have money to get more?: No   Transportation Needs: Low Risk  (10/18/2023)    Transportation Needs     Within the past 12 months, has lack of transportation kept you from medical appointments, getting your medicines, non-medical meetings or appointments, work, or from getting things that you need?: No   Physical Activity: Not on file   Stress: Not on  file   Social Connections: Not on file   Interpersonal Safety: Low Risk  (10/18/2023)    Interpersonal Safety     Do you feel physically and emotionally safe where you currently live?: Yes     Within the past 12 months, have you been hit, slapped, kicked or otherwise physically hurt by someone?: No     Within the past 12 months, have you been humiliated or emotionally abused in other ways by your partner or ex-partner?: No   Housing Stability: Low Risk  (10/18/2023)    Housing Stability     Do you have housing? : Yes     Are you worried about losing your housing?: No         FAMILY HISTORY:   Family History   Problem Relation Age of Onset    Diabetes Father     Anesthesia Reaction No family hx of     Venous thrombosis No family hx of     Myocardial Infarction No family hx of     Cerebrovascular Disease No family hx of     Thyroid Cancer No family hx of     Goiter No family hx of     Cancer No family hx of      Non-contributory for problems with anesthesia    REVIEW OF SYSTEMS:   The patient was asked a 14 point review of systems regarding constitutional symptoms, eye symptoms, ears, nose, mouth, throat symptoms, cardiovascular symptoms, respiratory symptoms, gastrointestinal symptoms, genitourinary symptoms, musculoskeletal symptoms, integumentary symptoms, neurological symptoms, psychiatric symptoms, endocrine symptoms, hematologic/lymphatic symptoms, and allergic/ immunologic symptoms.   The pertinent factors have been included in the HPI and below.  Patient Supplied Answers to Review of Systems      - No data to display                Physical Examination   The patient underwent a physical examination as described below. The pertinent positive and negative findings are summarized after the description of the examination.  Constitutional: The patient's developmental and nutritional status was assessed. The patient's voice quality was assessed.  Head and Face: The head and face were inspected for deformities. The  sinuses were palpated. The salivary glands were palpated. Facial muscle strength was assessed bilaterally.  Eyes: Extraocular movements and primary gaze alignment were assessed.  Ears, Nose, Mouth and Throat: The ears and nose were examined for deformities. The nasal septum, mucosa, and turbinates were inspected by anterior rhinoscopy. The lips, teeth, and gums were examined for abnormalities. The oral mucosa, tongue, palate, tonsils, lateral and posterior pharynx were inspected for the presence of asymmetry or mucosal lesions.    Neck: The tracheal position was noted, and the neck mass palpated to determine if there were any asymmetries, abnormal neck masses, thyromegally, or thyroid nodules.  Respiratory: The nature of the breathing and chest expansion/symmetry was observed.  Cardiovascular: The patient was examined to determine the presence of any edema or jugular venous distension.  Abdomen: The contour of the abdomen was noted.  Lymphatic: The patient was examined for infraclavicular lymphadenopathy.  Musculoskeletal: The patient was inspected for the presence of skeletal deformities.  Extremities: The extremities were examined for any clubbing or cyanosis.  Skin: The skin was examined for inflammatory or neoplastic conditions.  Neurologic: The patient's orientation, mood, and affect were noted. The cranial nerve  functions were examined.  Other pertinent positive and negative findings on physical examination:   OC/OP: no lesions, uvula midline, soft palate elevates symmetrically   Neck: no lesions, no TH tenderness to palpation, midline neck incision well healed  All other physical examination findings were within normal limits and noncontributory.  Procedures   Flexible laryngoscopy (CPT 00479)      Pre-procedure diagnosis: dysphonia  Post-procedure diagnosis: same as above  Indication for procedure: Mr. Mcmillan is a 38 year old male with see above  Procedure(s): Fiberoptic Laryngoscopy    Details of Procedure:  After informed consent was obtained, the patient was seated in the examination chair.  The areas of the nasopharynx as well as the hypopharynx were anesthetized with topical 4% lidocaine with 0.25% phenylephrine atomizer.  Examination of the base of tongue was performed first.  Attention was directed to any evidence of masses in the area or evidence of leukoplakia or candidal infection.  Attention was directed to the epiglottis where its size and position was determined and its movement on phonation of the vowel  e .  The piriform sinuses were then inspected for any mass lesions or pooling of secretions.  Attention was then directed to the larynx. The vocal folds were inspected for infection or any areas of leukoplakia, for masses, polypoid degeneration, or hemorrhage.  Having done this, the arytenoids and vocal processes were inspected for erythema or evidence of granuloma formation.  The posterior commissure was then inspected for evidence of inflammatory changes in the mucosa and heaping up of mucosal tissue. The patient was then instructed to say the vowel  e .  Adduction of vocal folds to the midline was observed for any evidence of paresis or paralysis of the larynx or asymmetry in rotation of the larynx to the left or right. The patient was asked to breathe and the degree of abduction was noted bilaterally.  Subglottic view of the larynx was obtained for any additional mass lesions or mucosal changes.  Finally the post cricoid was examined for evidence of pooling of secretions, as well as the pharyngeal wall mucosa.   Anesthesia type: 0.25% phenylephrine    Findings:  Anatomic/physiological deviations: RNC, right vocal fold paralysis, glottal gap, well healed CTR Incision   Right vocal process: Marked restriction of mobility   Left vocal process: No restriction of mobility  Glottal gap: Glottal gap  Supraglottic structures: Normal  Hypopharynx: Normal     Estimated Blood Loss: minimal  Complications:  None  Disposition: Patient tolerated the procedure well          Fiberoptic Endoscopic Evaluation of Swallowing (CPT 17493)  and Interpretation of Swallowing (CPT 97154)    Indications: See above notes for complete history and physical. Patient complains of dysphagia to both solids and liquids and/or there is suggestion on history and endoscopic exam of the presence of dysphagia causing medical complaints.  Swallowing evaluation is being performed to assess the presence and degree of dysphagia, and to recommend a safe diet.     Pulmonary Status:  No PNA   Current Diet:              regular                                        thin liquids      Consistency Amounts:  Thin Liquid: sip   Puree: sip  Solid: cookies            Positioning: upright in a chair  Oral Peripheral Exam: See physical exam section.  Anatomic Notes: See Videostroboscopy report for assessment of anatomy and laryngeal functioning  Pharyngeal secretions prior to administration of liquid or food: No   Oral Phase Abnormal Findings: No abnormal behavior observed   Pharyngeal Phase Abnormal Findings: penetration with thins     Recommended Diet:  regular                                        thin liquids           Laryngoscopy with Injection Augmentation (CPT 62845)    Pre-procedure diagnosis: Glottic insufficiency  Post-procedure diagnosis: Same  Indication for procedure: Mr. Mcmillan is a 38 year old male with right vocal cord paralysis  Procedure(s): Fiberoptic Laryngoscopy with Injection of 1.0 cc of Voice Gel into the Right vocal fold for augmentation.  Details of Procedure: Informed consent was obtained and the patient was told that there is a small possibility of airway obstruction following this procedure and that occasionally bleeding ensues, which can lead to bleeding into the cord, with the possibility of terminating the procedure prematurely.  The patient was also instructed that their voice might be tight for a day or two, due to the water  content of the injected material. Then, the patient was seated upright in the chair. The areas of the nasopharynx as well as the hypopharynx were anesthetized with topical 4% lidocaine with 0.25% phenylephrine atomizer. The scope was then passed in the right or left nasal cavity, depending upon which side had the greater opening, and passed in through middle or the inferior meatus. Upon reaching the nasopharynx, the patient was instructed to breathe through the nasal cavity and the scope was passed inferiorly into the hypopharynx until the epiglottis was visualized. The scope was then passed beyond the epiglottis and both the brightness and focus were readjusted for maximum resolution. The vocal cords were visualized and the larynx was then carefully identified both for vocal cord insufficiency and/or paralysis. An assessment of glottic closure was made. The chin of the patient was moved up and down to identify external landmarks including the cricoid, cricothyroid membrane, superior notch of the thyroid cartilage, and the hyoid bone. These landmarks were palpated and, based upon this, a decision was made as to the position of the vocal cords in relationship to external landmarks. A 27-gauge needle was then secured to the augmentation syringe and air was removed from the needle. The needle was passed transcartilagenous or transmembranous into the vocal fold. The needle tips were then observed on the video monitor, care being taken not to perforate the membranous vocal cord. After correct needle placement was confirmed on the video monitor, augmentation agent was injected until the vocal cords were able to achieve optimal closure during adduction. During injection the patient s airway was carefully observed to maintain at least a 4 mm airway during injection. The injection was performed in 0.1 ml aliquots, the patient was asked to cough and to phonate  e  after each aliquot to dynamically assess the anatomic and  acoustic effects of the injection. The vocal fold was over-injected by approximately 0.1 mg to account for resorption of the water content. Following maximal augmentation, the scope was withdrawn atraumatically.   Anesthesia type: none    Findings:   Approximately 1.0 cc of Voice Gel implant was injected   Anatomic/physiological deviations: RNC, transthyroid cartilage approach    Right vocal process: No restriction of mobility   Left vocal process: No restriction of mobility  Glottal gap: Complete glottal closure  Supraglottic structures: Normal  Hypopharynx: Normal    Estimated Blood Loss: minimal  Complications: None  Disposition: Patient tolerated the procedure well    Review of Relevant Clinical Data   I personally reviewed:  Notes:   Antonio Morales MD - ENT 09/11/23  Diagnosis: T4aN1a classic papillary thyroid cancer right lobe (transmural tracheal invasion)     Treatment: Total thyroidectomy, sacrifice of the right recurrent laryngeal nerve, tracheal resection 8/24/23     Post op PTH normal     Pathology:  5.1 cm classic PTC  Unencapsulated  Extends throguh trachea cartilage, involves right recurrent laryngeal nerve  +LVI, +PNI  Margins negative  One perithyroid lymph node with PTC  BRAF V600E +     Interval history:  He has no complaints today.  He has had some intermittent palpitations.  He is tolerating an oral diet.  No hypocalcemia symptoms.  No respiratory complaints.     Assessment and plan:  Mr. Montes is recovering well from his surgery.  I reviewed the pathology results with him and his family.  He will need adjuvant radioactive iodine.  I am not sure of the role of external beam radiation therapy in this situation.  I will discuss this with my colleagues and also discuss this at our multidisciplinary head and neck conference.    Radiology:     CT Neck 6/6/23  IMPRESSION:   1. Right thyroid mass consistent with known papillary thyroid  carcinoma. Associated local mass effect with moderate narrowing of  the  trachea.  2. No cervical adenopathy.    US Head Neck   IMPRESSION:  Soft tissue neck ultrasound with lymph node measurements as described  above. No suspicious lymph nodes.    US Thyroid 5/19/23  Impression:  1.  Increased 6.1 cm suspicious nodule replacing/enlarging the right  thyroid lobe (TR4), previously 4.1 cm on 4/6/2017. Tissue sampling  recommended.   2.  Slightly increased 1.6 cm right inferior lobe thyroid nodule  (TR2), previously 1.3 cm on 4/6/2017.    CT Chest 3/3/23  IMPRESSION:  1.  Markedly enlarged right lobe of the thyroid with tracheal deviation to the left of midline. Further evaluation with thyroid ultrasound suggested.  2.  Minimal intraluminal, tracheal component on the right where trachea is deviated most likely reflects adherent secretions rather than a polyp or an infiltrative component.  3.  No other abnormalities are seen to explain hemoptysis.  4.  No evidence of pulmonary emboli.    XR Chest 3/3/23  IMPRESSION: Lungs are clear. Heart and pulmonary vascularity are normal. No signs of acute disease    Pathology:   Final Diagnosis 8/24/23  A. EVALUATE FOR RIGHT INFERIOR PARATHYROID, BIOPSY:  -Benign parathyroid tissue.     B. THYROID GLAND, TOTAL THYROIDECTOMY WITH TRACHEAL RESECTION:  -PAPILLARY THYROID CARCINOMA, classic/conventional type, 5.1 cm in greatest dimension.  -Tumor entirely replaces right thyroid lobe, it is unencapsulated, extends through tracheal cartilage, and involves right recurrent laryngeal nerve, tracheal submucosa and skeletal muscle.  -Angiolymphatic and perineural invasion are present.  -Margins are negative, including superior and inferior tracheal margins; closest uninvolved margin is anterior right lobe at 1 mm.  -BRAF V600E immunohistochemistry test is POSITIVE.  -One perithyroid lymph node focally involved by metastatic carcinoma, less than 1 mm (1/1).  -Isthmus and left thyroid lobe are negative for malignancy.  -AJCC pathologic staging is pT4a  "N1a.  -See synoptic report.     C. INFERIOR TRACHEAL WALL, EXCISION:  -Tracheal wall, negative for malignancy.          Labs:  Lab Results   Component Value Date    TSH 11.87 (H) 09/27/2023     Lab Results   Component Value Date     08/27/2023    CO2 21 (L) 08/27/2023    BUN 6.4 08/27/2023    PHOS 4.3 09/27/2023     Lab Results   Component Value Date    WBC 5.2 08/27/2023    HGB 13.1 (L) 08/27/2023    HCT 39.5 (L) 08/27/2023    MCV 88 08/27/2023     08/27/2023     Lab Results   Component Value Date    PTT 29 03/03/2023    INR 1.06 03/03/2023     No results found for: \"BERLIN\"  No components found for: \"RHEUMATOIDFACTOR\", \"RF\"  No results found for: \"CRP\"  No components found for: \"CKTOT\", \"URICACID\"  No components found for: \"C3\", \"C4\", \"DSDNAAB\", \"NDNAABIFA\"  No results found for: \"MPOAB\"    Patient reported Quality of Life (QOL) Measures   Patient Supplied Answers To VHI Questionnaire      - No data to display                  Patient Supplied Answers To EAT Questionnaire      - No data to display                  Patient Supplied Answers To CSI Questionnaire      - No data to display                  Patient Supplied Answers to Dyspnea Index Questionnaire:      - No data to display                Impression & Plan     IMPRESSION: Mr. Mcmillan is a 38 year old male who is being seen for the following:    Dysphonia  - ongoing for 2 months  - stable  - in the setting of history of total thyroidectomy with tracheal resection on 8/24/23  - speaking voice is affected   - singing voice is affected  - no pain with voice use  - voice demand is low  - scope evaluation shows right vocal fold paralysis with glottal gap  - FEES shows penetration with thins   - discussed etiology of vocal fold paralysis in this case is due to right vocal fold paralysis and CTR  - discussed prognosis of vocal fold paralysis is permanent   - discussed options of treatment which include observation, voice therapy, temporary augmentation vs " long term options  - discussed long term options of vocal fold augmentation with injection laryngoplasty with CAHA vs lipoinjection, framework surgery and reinnervation  - given he is so recent from his CTR recommend voice gel injection laryngoplasty to see if voice is improved and if this helps then will need a longer term filler   - a permanent option will be offered next summer once he is 1 year from his CTR  - discussed his pitch will always be low   - right vocal fold injection was performed today without complication  Plan  - observation  - continue swallow precautions with thins    RETURN VISIT: in 3-4 weeks    Thank you for the kind referral and for allowing me to share in the care of Mr. Mcmillan. If you have any questions, please do not hesitate to contact me.    Scribe Disclosure:   I, MARTY RAMOS, am serving as a scribe; to document services personally performed by Tessy Dale MD -based on data collection and the provider's statements to me.     Provider Disclosure:  I agree with above History, Review of Systems, Physical exam and Plan.  I have reviewed the content of the documentation and have edited it as needed. I have personally performed the services documented here and the documentation accurately represents those services and the decisions I have made.      Electronically signed by:    Tessy Dale MD    Laryngology    [facility]  Department of  Otolaryngology - Head and Neck Surgery  Clinics & Surgery Center  [address] [place] [address]  Appointment line: [phone]  Fax: [phone]  [email or web address]

## 2023-10-26 NOTE — PROGRESS NOTES
"[facility]  Clinical Voice and Upper Airway Evaluation Report    Patient's Name: Leonid Mcmillan  Date of Evaluation: 10/26/2023  Providing SLP: Leona Loco MS CCC-SLP  Seen in Conjunction With: Tesys Dale MD - Tara Erazo CCC-SLP  Referring Provider and Facility: Antonio Morales MD - Otolaryngology  Insurance Coverage: [facility]  Chief Complaint: Dysphonia, Dysphagia, Dyspnea  Evaluation Location: [facility]  Others in Attendance for the Evaluation: Alan , Carlee - his wife  Time of Evaluation: 8:20 - 9:27 PM      Patient History: Leonid is a 38-year-old male who presents today for evaluation of dysphonia.     Dysphonia:   Onset: immediately following thyroid surgery  Progression: stable since  Concerns  Denies voice issues prior to thyroid surgery  Rough and quiet voice  Worsens with: increased voice use  Previous voice therapy or other interventions:   Underwent total thyroidectomy with sacrifice of the R RNL and tracheal resection on 8/24/23  T4aN1a classic papillary thyroid cancer of the R lobe with transmural tracheal invasion  Breathy voice noted and R vocal fold paralysis observed on 9/11/23 by Dr. Morales    Dyspnea:   Onset: following thyroid surgery  Progression: stable since surgery  Respiratory conditions: tracheal resection required with thyroid surgery  Concerns  Endorses shortness of breath  Denies noisy breathing  Triggers:  Exertion  After eating    Dysphagia:   Onset: following thyroid surgery  Progression: stable  Concerns  No issues with foods  Choking with liquids  Better with taking small sips and going slow  Happens more with bigger sips  Difficult items: liquids  Recent pneumonias/chest infections: denies  Previous swallow study results:   Results from FEES with Hilary Delgado CCC-SLP, on 9/11/23: \"impaired swallowing, characterized by intermittent s/sx of aspiration/penetration on thin liquid trials and multiple swallows " "needed on all swallows. Identified deficits interfere with their ability to consume an oral diet as compared to previous level of function.\"    Cough / Throat Clearing: denies outside of drinking    Throat Pain: denies      Quality of Life Questionnaires:    Patient Supplied Answers To Last 2 VHI Questionnaires      10/26/2023     8:15 AM   Voice Handicap Index (VHI-10)   My voice makes it difficult for people to hear me 2   People have difficulty understanding me in a noisy room 4   My voice difficulties restrict my personal and social life.  3   I feel left out of conversations because of my voice 2   My voice problem causes me to lose income 1   I feel as though I have to strain to produce voice 1   The clarity of my voice is unpredictable 2   My voice problem upsets me 4   My voice makes me feel handicapped 3   People ask, \"What's wrong with your voice?\" 2   VHI-10 24     Patient Supplied Answers To Last 2 CSI Questionnaires      10/26/2023     8:18 AM   Cough Severity Index (CSI)   My cough is worse when I lie down 0   My coughing problem causes me to restrict my personal and social life 0   I tend to avoid places because of my cough problem 0   I feel embarrassed because of my coughing problem 0   People ask, ''What's wrong?'' because I cough a lot 0   I run out of air when I cough 0   My coughing problem affects my voice 0   My coughing problem limits my physical activity 0   My coughing problem upsets me 0   People ask me if I am sick because I cough a lot 0   CSI Score 0     Patient Supplied Answers To Last 2 EAT Questionnaires      10/26/2023     8:16 AM   Eating Assessment Tool (EAT-10)   My swallowing problem has caused me to lose weight 0   My swallowing problem interferes with my ability to go out for meals 0   Swallowing liquids takes extra effort 2   Swallowing solids takes extra effort 0   Swallowing pills takes extra effort 0   Swallowing is painful 0   The pleasure of eating is affected by my " "swallowing 0   When I swallow food sticks in my throat 0   I cough when I eat 0   Swallowing is stressful 0   EAT-10 2       Perceptual Analysis (42499): Evaluation of Voice / Speech / Non-Communicative Laryngeal Behaviors    The GRBAS is a perceptual rating of voice change. 0 indicated no impairment, 3 indicates a severe impairment. \"C\" and \"I\" may be used to signify if these feature was observed consistently or intermittently respectively. This is a rating based on clinical judgement of disordered voice quality.  G ( 2-3 - C ) General Dysphonia     R ( 2-3 ) Roughness     B ( 2 ) Breathiness     A ( 2-3 ) Asthenia     S ( 2-3 ) Strain  Additional information: persistent diplophonia    *Validity of this measure may be slightly reduced when performed via acoustic signal from virtual visit*    Additional observations:   Cough/ Throat Clear: not observed today    Breathing patterns: appropriate at rest  Overt tension: \" \"  Habitual pitch: difficult to assess due to persistent diplophonia - judged to be lower in pitch compared to age- and gender-matched peers  Pitch range: little to no pitch variation with glissando tasks  Maximum phonation time at normal pitch and loudness on /a/ vowel: not assessed today  Resonance: difficult to assess due to persistent diplophonia  Loudness: moderately reduced      Laryngoscopy without stroboscopy:    Provider performing exam: Tessy Dale MD    Informed consent: Informed verbal consent was obtained, which includes potential side-effects, risks, and benefits of the procedure.     Anesthetic: Topical anesthesia with 3% lidocaine and 0.25% phenylephrine was applied the nostrils bilaterally. Viscous lidocaine 4% was applied to the tip of the endoscope.    Scope type: A distal chip flexible laryngoscope was passed through the nare with halogen light source(s).    The laryngeal and pharyngeal structures were evaluated for gross appearance, mobility, function, and focal lesions / " "abnormalities of the associated mucosa.  Velar Function: not assessed today  General Appearance:   Erythema of the arytenoid complexes  Secretions: pooling in the R pyriform sinus   Subglottis Appearance: visible portion is patent   R Arytenoid Abduction / Adduction: immobile from a paramedian position  L Arytenoid Abduction / Adduction: timely ab/adduction with appropriate range of motion  Mediolateral Compression: moderate with phonation on the L side - not observed on the R  Anteroposterior Compression: WNL   Vocal Fold Elongation: minimal pitch variation abilities noted today  Left (L) Vocal Fold Edge and Mucosa:  White with smooth and straight appearance   Increased redness at the anterior commissure  Right (R) Vocal Fold Edge and Mucosa:  White with smooth appearance  Grooved appearance along the vibratory edge  Increased redness at the anterior commissure  Narrow Band Imaging: demonstrates similar findings as halogen light   Glottic Closure: incomplete under halogen light source today    FEES: Evaluation was performed by Tara Erazo and Dr. Dale. Impressions from this clinical document indicates: \"Puree and solid trials are unremarkable. With thin liquids, penetration to the cords with eventual silent aspiration with large sips and serial sips. With small, single sips trace penetration with no aspiration. Right head turn did not improve airway protection. Trained pt to continue with regular textures and thin liquids with strict adherence to small, single sips and slow pace.\"       Impressions and Plan:     Leonid is a 38-year-old male presenting today with dysphonia R49.0 secondary to R vocal fold paralysis J38.01 s/p total thyroidectomy for papillary thyroid cancer in August 2023 with sacrifice of the R RNL and tracheal resection. Perceptually, his voice demonstrated moderate to marked roughness (diplophonia), strain, weakness, and breathiness. Laryngoscopy today demonstrated R vocal fold immobility in a " "paramedian position with incomplete glottic closure and mediolateral compression with phonation with the L ventricular fold. Therefore, no voice therapy interventions are recommended at this time. I assisted Dr. Dale with a Prolaryn injection into the R vocal fold today, and Leonid's voice was free of diplophonia during phonation attempts following the procedure. His wife reported that his voice sounded \"normal\" afterwards. Leonid will return to see Dr. Dale as needed. He is in agreement with this plan of care.       Billed Procedures:    Perceptual voice assessment 14996  Assessment and treatment time: 67 minutes  Chart review, interpretation of testing, documentation preparation, etc: 13 minutes      Thank you for allowing me to participate in this patient's care,    Leona Loco MS CCC-SLP  Speech-Language Pathologist  [facility]  Department of Otolaryngology - Head and Neck Surgery  [facility]  [email or web address]  Direct: [phone]  [phone]    *This note may have been completed using [id number] dictation software, so errors may exist. Please contact me for clarification if needed*  "

## 2023-10-26 NOTE — PATIENT INSTRUCTIONS
1.  You were seen in the ENT Clinic today by . If you have any questions or concerns after your appointment, please call [phone]. Press option #1 for scheduling related needs. Press option #3 for Nurse advice.    2.   has recommended  the following:   - swallow precautions with thin liquids    3.  Plan is to return to clinic in 3-4 weeks      Bernice Guo LPN  [phone]  [facility] - Otolaryngology

## 2023-11-06 ENCOUNTER — VIRTUAL VISIT (OUTPATIENT)
Dept: ENDOCRINOLOGY | Facility: CLINIC | Age: 38
End: 2023-11-06
Payer: COMMERCIAL

## 2023-11-06 DIAGNOSIS — E89.0 POSTOPERATIVE HYPOTHYROIDISM: ICD-10-CM

## 2023-11-06 DIAGNOSIS — C73 PAPILLARY THYROID CARCINOMA (H): Primary | ICD-10-CM

## 2023-11-06 PROCEDURE — 99215 OFFICE O/P EST HI 40 MIN: CPT | Mod: VID

## 2023-11-06 ASSESSMENT — PAIN SCALES - GENERAL: PAINLEVEL: NO PAIN (0)

## 2023-11-06 NOTE — NURSING NOTE
Is the patient currently in the state of [place]? YES    Visit mode:VIDEO    If the visit is dropped, the patient can be reconnected by: VIDEO VISIT: Text to cell phone:   Telephone Information:   Mobile [phone]       Will anyone else be joining the visit? NO  (If patient encounters technical issues they should call [phone]    How would you like to obtain your AVS? MyChart    Are changes needed to the allergy or medication list? No    Reason for visit: RECHECK    Pts wife will be interpreting for pt. Shelby Kocher VVF

## 2023-11-06 NOTE — PROGRESS NOTES
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  DONTAE recurrent risk high   Recommend radioactive iodine 150 mCi adjuvant therapy, thyrogen stimulated   Radioiodine has been shown to improve survival and decrease recurrence in individuals with pT4 disease.   We need to work around his travel to [facility] in December.   We came up with schedule today with treatment 1/4/2024 (see AVS)   I have counseled  them today on the following:  Radioiodine treatment procedure, risks of treatment  Radiation safety precautions  Low iodine diet    Post surgical hypothyroidism.    Treat to target TSH < 0.2 .  Unstable.  On LT4  112 *8/week (mean 128 mcg/day)  Repeat labs now    Addendum:  11/17/23 Tg 0.3, DONTAE < 0.4, TSH 7.73, free t4 1.5-- increase to 150 mcg/day.  Given this result, will reduce the dose of adjuvant 131I to 100 mCi.    1/4/2024 Thyrogen stimulated Tg 0.56, DONTAE < 0.4, .2, free T4 2.2  1/4/23 131I 100.4 mCi  1/9/23 post therapy TBS  thyroid bed uptake only    Due to the continued risks of COVID 19 transmission and to improve ease of access this visit was a video visit. The patient gave verbal consent for the visit today.    I have independently reviewed and interpreted labs, imaging as indicated.     Distant Location (provider location):  Off-site  Mode of Communication:  Video Conference via GoCardless  Chart review/prep time 1  0915-4049  Visit Start time 1735   Visit Stop time  2327  _55_ minutes spent on the date of the encounter doing chart review, history and exam, documentation and further activities as noted above.  .  Leslie Galvez MD    Chief complaint:   HISTORY OF PRESENT ILLNESS  He was seen today along with his wife, who served as .    Leonid presents for follow up of BRAF V600E mutant papillary thyroid carcinoma grossly invading tracheal cartilage, post surgical hypothyroidism.  I have seen him once before on 9/27/23. At that time he was biochemically hypothyroid on LT4 112 mcg/day. We increased to 112 x 8/week (128 mcg/day). Today she notes he can't tell the difference in terms of how he feels.      He first became aware of right thyroid mass in 2014, before he left [place].    Right thyroid mass was seem on Thyroid US in 2017.  Recommendation for FNAB was made in 2017 and 2021   He presented to the ED with hemoptysis 3/3/2023.   CT neck and chest  showed the right mass. 6/26 fiberoptic laryngoscopy by Dr Morales showed tumor emanating into the airway.  FNAB of the right thyroid mass was positive for papillary thyroid carcinoma.   Treatment of the thyroid cancer has been as follows:   8/24/23 total thyroidectomy, tracheal resection (3 rings removed), sacrifice of the RLN, reimplanation of right inferior parathyroid gland.  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    10/26/23 Voice gel injection (Dr Dale)    Endocrine relevant labs are as follows:  4/6/17 TSH 0.89  3/3/23 TSH 1.07, Ca 8.7  8/24/23 PTH 31, Ca 8.5,   8/27/23 Ca 8.2, glucose 104   9/27/23 Tg 1, DONTAE < 0.4, urine iodine 58 mcg/L TSH 11.87, free T4 1.39, Ca 9.4, phos 4.3,    Relevant imaging is as follows: (as read by me as it pertains to endocrine relevant organs)  3/3/2023 CT neck and chest: large right thyorid mass with tracheal deviation to the left ; some tracheal compression   5/19/2023  thyroid US copmared with 4/6/17   Right thyroid mass (5.9 x 4.2 x  expands right lobe - anterior borders suggestion suggestive of ETE ; heterogeneous; few echogenic foci; was 3.4 x 3.5 x 5.8 cm -- FNAB 5/25/23   Right inf # 1 1.4 x 1.1 x 1.6 cm - ? Extrathyroidal ; was   1.3 x 1 x 1.3   Left lobe smallish   6/6/23 neck US: no abnormal LNs  6/6/23 CT chest and neck with contrast - right thyroid mass very heerogeneous - extends posteriorly to vertebral body; tracheal deviation and some compression; irregular right tracheal border ? Shows site of invasion?     Childhod radiation exposure: none ; no medical xray     REVIEW OF SYSTEMS  Weight is up 10# ;   Voice not normal; on and off; more use makes it worse; voice a lot better than before  No choking on water.    Cardiac: negative; they haven't been checking the blood pressure  GI: negative   No dizziness   No swelling    Past Medical History  Past Medical History:   Diagnosis Date    Infection due to 2019 novel coronavirus 02/2023    Papillary thyroid carcinoma (H) 08/24/2023    Postoperative hypothyroidism 08/24/2023    Thyroid mass 2014     Past Surgical History:   Procedure Laterality Date    BRONCHOSCOPY FLEXIBLE AND RIGID N/A 7/13/2023    Procedure: Flexible bronchoscopy, direct laryngoscopy;  Surgeon: Antonio Morales MD;  Location: [facility] OR    ESOPHAGOSCOPY N/A 7/13/2023    Procedure: ESOPHAGOSCOPY;  Surgeon: Antonio Morales MD;  Location: [facility] OR    ESOPHAGOSCOPY N/A 8/24/2023    Procedure: Esophagoscopy;  Surgeon: Antonio Morales MD;  Location: [facility]    LARYNGOSCOPY, BRONCHOSCOPY, COMBINED N/A 8/24/2023    Procedure: direct laryngoscopy, flexible Bronchoscopy;  Surgeon: Antonio Morales MD;  Location: [facility] OR    RESECT TRACHEA WITH RECONSTRUCTION N/A 8/24/2023    Procedure: tracheal resection;  Surgeon: Pieter Ballard MD;  Location: [facility] OR    SHOULDER SURGERY Right 2005    shoulder injury, did not have general anesthesia    THYROIDECTOMY  "N/A 8/24/2023    Procedure: total thyroidectomy;  Surgeon: Antonio Morales MD;  Location: [facility] OR     Medications  Current Outpatient Medications   Medication Sig Dispense Refill    levothyroxine (SYNTHROID/LEVOTHROID) 112 MCG tablet MON to FRI 1 tablet/day; SAT and SUN 1.5 tablet /day 96 tablet 4     Allergies  No Known Allergies    Family History  Family History   Problem Relation Age of Onset    Diabetes Father     Anesthesia Reaction No family hx of     Venous thrombosis No family hx of     Myocardial Infarction No family hx of     Cerebrovascular Disease No family hx of     Thyroid Cancer No family hx of     Goiter No family hx of     Cancer No family hx of      Social History  Social History     Tobacco Use    Smoking status: Never     Passive exposure: Never    Smokeless tobacco: Never   Vaping Use    Vaping Use: Never used   Substance Use Topics    Alcohol use: Never    Drug use: Never     Came from [facility] 2014   6 year old daughter; ;  Wife and daughter will leave the home during the time he needs quarantine post 1311I  traveling to [facility] in December 6; returning 12/25. .     Physical Exam  GENERAL: no distress  BP Readings from Last 1 Encounters:   10/26/23 (!) 124/96      Pulse Readings from Last 1 Encounters:   10/26/23 63      Resp Readings from Last 1 Encounters:   10/18/23 18      Temp Readings from Last 1 Encounters:   10/18/23 98.2  F (36.8  C) (Oral)      SpO2 Readings from Last 1 Encounters:   10/18/23 98%      Wt Readings from Last 1 Encounters:   10/18/23 72.3 kg (159 lb 6.4 oz)      Ht Readings from Last 1 Encounters:   10/18/23 1.626 m (5' 4\")     SKIN: Visible skin clear. No significant rash, abnormal pigmentation or lesions.  EYES: Eyes grossly normal to inspection.    NECK: no visible masses; no grossly visible goiter  RESP: No audible wheeze, cough, or increased work of breathing.    NEURO: Cranial nerves grossly intact.  Awake, alert, responds appropriately to questions.  " Mentation and speech fluent.  PSYCH:affect normal,and appearance well-groomed.    DATA REVIEW   Latest Reference Range & Units 09/27/23 16:55   Calcium 8.6 - 10.0 mg/dL 9.4   Phosphorus 2.5 - 4.5 mg/dL 4.3   T4 Free 0.90 - 1.70 ng/dL 1.39   TSH 0.30 - 4.20 uIU/mL 11.87 (H)   (H): Data is abnormally high      Examination:  NM THYROID ABLATION THERAPY I 131  1/4/2024 10:08 AM   Comparison:  Neck CT 6/6/2023.  History:  Papillary thyroid carcinoma (H); Postoperative hypothyroidism  Procedure: After confirming the identity of the patient by independent  sources, and after consultation with the ordering physician Dr. Galvez the risks and benefits of I-131 thyroid therapy were  discussed at length with the patient, and all questions were answered.  100.4 mCi of I-131 were given by mouth to the patient.                                                            Impression:  100.4 mCi I-131 therapeutic treatment for papillary thyroid carcinoma.  I have personally reviewed the examination and initial interpretation and I agree with the findings.  MONICA CROWELL MD     Examination: Nuclear medicine thyroid scan and SPECT-CT of the head  and neck, on   1/9/2024 11:29 AM  .  Indication:  Papillary thyroid carcinoma (H); Postoperative hypothyroidism .  Additional Information: 38-year-old man with papillary thyroid carcinoma involving the right thyroid lobe status post surgical resection presents for adjuvant therapy with radioactive iodine 131.  Technique:  The patient received 100.4 m -Ci of I-131orally.  Scan was obtained 5 days after administration.  Findings:  There is intense radiotracer uptake within the left thyroid gland onplanar images. No other area of abnormal uptake is seen on whole body  planar imaging. No definite evidence of distant metastases.  SPECT-CT demonstrates localized radiotracer uptake in the residual left thyroid gland. No abnormal uptake within the cervical lymph nodes  or any other region within  the field-of-view. Postoperative changes of right thyroid carcinoma resection.  Visualized lung apices appear unremarkable. Mild mucosal thickening right maxillary sinus.                                                           Impression:  Radiotracer uptake within the residual left thyroid gland without an definite evidence of residual disease or distant metastases.  I have personally reviewed the examination and initial interpretation and I agree with the findings.  SUZIE DEY MD

## 2023-11-06 NOTE — LETTER
11/6/2023       RE: Leonid Mcmillan  [address] [place] [address]     Dear Colleague,    Thank you for referring your patient, Leonid Mcmillan, to the [facility]OLOG[facility] [place] [facility]. Please see a copy of my visit note below.    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  DONTAE recurrent risk high   Recommend radioactive iodine 150 mCi adjuvant therapy, thyrogen stimulated   Radioiodine has been shown to improve survival and decrease recurrence in individuals with pT4 disease.   We need to work around his travel to [facility] in December.   We came up with schedule today with treatment 1/4/2024 (see AVS)   I have counseled  them today on the following:  Radioiodine treatment procedure, risks of treatment  Radiation safety precautions  Low iodine diet    Post surgical hypothyroidism.    Treat to target TSH < 0.2 .  Unstable.  On LT4  112 *8/week (mean 128 mcg/day)  Repeat labs now    Due to the continued risks of COVID 19 transmission and to improve ease of access this visit was a video visit. The patient gave verbal consent for the visit today.    I have independently reviewed and interpreted labs, imaging as indicated.     Distant Location (provider location):  Off-site  Mode of Communication:  Video Conference via [facility]  Chart review/prep time 1  [id number]  Visit Start time 1735   Visit Stop time  9247  _55_ minutes spent on the date of the encounter doing chart review, history and exam, documentation and further activities as noted above.  .  Leslie Galvez MD    Chief complaint:  HISTORY OF  PRESENT ILLNESS  He was seen today along with his wife, who served as .    Leonid presents for follow up of BRAF V600E mutant papillary thyroid carcinoma grossly invading tracheal cartilage, post surgical hypothyroidism.  I have seen him once before on 9/27/23. At that time he was biochemically hypothyroid on LT4 112 mcg/day. We increased to 112 x 8/week (128 mcg/day). Today she notes he can't tell the difference in terms of how he feels.      He first became aware of right thyroid mass in 2014, before he left [place].    Right thyroid mass was seem on Thyroid US in 2017.  Recommendation for FNAB was made in 2017 and 2021   He presented to the ED with hemoptysis 3/3/2023.   CT neck and chest  showed the right mass. 6/26 fiberoptic laryngoscopy by Dr Morales showed tumor emanating into the airway.  FNAB of the right thyroid mass was positive for papillary thyroid carcinoma.   Treatment of the thyroid cancer has been as follows:   8/24/23 total thyroidectomy, tracheal resection (3 rings removed), sacrifice of the RLN, reimplanation of right inferior parathyroid gland.  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    10/26/23 Voice gel injection (Dr Dale)    Endocrine relevant labs are as follows:  4/6/17 TSH 0.89  3/3/23 TSH 1.07, Ca 8.7  8/24/23 PTH 31, Ca 8.5,   8/27/23 Ca 8.2, glucose 104   9/27/23 Tg 1, DONTAE < 0.4, urine iodine 58 mcg/L TSH 11.87, free T4 1.39, Ca 9.4, phos 4.3,    Relevant imaging is as follows: (as read by me as it pertains to endocrine relevant organs)  3/3/2023 CT neck and chest: large right thyorid mass with tracheal deviation to the left ; some tracheal compression   5/19/2023 thyroid US  copmared with 4/6/17   Right thyroid mass (5.9 x 4.2 x  expands right lobe - anterior borders suggestion suggestive of ETE ; heterogeneous; few echogenic foci; was 3.4 x 3.5 x 5.8 cm -- FNAB 5/25/23   Right inf # 1 1.4 x 1.1 x 1.6 cm - ? Extrathyroidal ; was   1.3 x 1 x 1.3   Left lobe smallish   6/6/23 neck US: no abnormal LNs  6/6/23 CT chest and neck with contrast - right thyroid mass very heerogeneous - extends posteriorly to vertebral body; tracheal deviation and some compression; irregular right tracheal border ? Shows site of invasion?     Childhod radiation exposure: none ; no medical xray     REVIEW OF SYSTEMS  Weight is up 10# ;   Voice not normal; on and off; more use makes it worse; voice a lot better than before  No choking on water.    Cardiac: negative; they haven't been checking the blood pressure  GI: negative   No dizziness   No swelling    Past Medical History  Past Medical History:   Diagnosis Date    Infection due to 2019 novel coronavirus 02/2023    Papillary thyroid carcinoma (H) 08/24/2023    Postoperative hypothyroidism 08/24/2023    Thyroid mass 2014     Past Surgical History:   Procedure Laterality Date    BRONCHOSCOPY FLEXIBLE AND RIGID N/A 7/13/2023    Procedure: Flexible bronchoscopy, direct laryngoscopy;  Surgeon: Antonio Morales MD;  Location: [facility] OR    ESOPHAGOSCOPY N/A 7/13/2023    Procedure: ESOPHAGOSCOPY;  Surgeon: Antonio Morales MD;  Location: [facility]    ESOPHAGOSCOPY N/A 8/24/2023    Procedure: Esophagoscopy;  Surgeon: Antonio Morales MD;  Location: [facility]    LARYNGOSCOPY, BRONCHOSCOPY, COMBINED N/A 8/24/2023    Procedure: direct laryngoscopy, flexible Bronchoscopy;  Surgeon: Antonio Morales MD;  Location: [facility]    RESECT TRACHEA WITH RECONSTRUCTION N/A 8/24/2023    Procedure: tracheal resection;  Surgeon: Pieter Ballard MD;  Location: [facility] OR    SHOULDER SURGERY Right 2005    shoulder injury, did not have general anesthesia    THYROIDECTOMY N/A 8/24/2023  "   Procedure: total thyroidectomy;  Surgeon: Antonio Morales MD;  Location: [facility] OR     Medications  Current Outpatient Medications   Medication Sig Dispense Refill    levothyroxine (SYNTHROID/LEVOTHROID) 112 MCG tablet MON to FRI 1 tablet/day; SAT and SUN 1.5 tablet /day 96 tablet 4     Allergies  No Known Allergies    Family History  Family History   Problem Relation Age of Onset    Diabetes Father     Anesthesia Reaction No family hx of     Venous thrombosis No family hx of     Myocardial Infarction No family hx of     Cerebrovascular Disease No family hx of     Thyroid Cancer No family hx of     Goiter No family hx of     Cancer No family hx of      Social History  Social History     Tobacco Use    Smoking status: Never     Passive exposure: Never    Smokeless tobacco: Never   Vaping Use    Vaping Use: Never used   Substance Use Topics    Alcohol use: Never    Drug use: Never     Came from [facility] 2014   6 year old daughter; ;  Wife and daughter will leave the home during the time he needs quarantine post 1311I  traveling to [facility] in December 6; returning 12/25. .     Physical Exam  GENERAL: no distress  BP Readings from Last 1 Encounters:   10/26/23 (!) 124/96      Pulse Readings from Last 1 Encounters:   10/26/23 63      Resp Readings from Last 1 Encounters:   10/18/23 18      Temp Readings from Last 1 Encounters:   10/18/23 98.2  F (36.8  C) (Oral)      SpO2 Readings from Last 1 Encounters:   10/18/23 98%      Wt Readings from Last 1 Encounters:   10/18/23 72.3 kg (159 lb 6.4 oz)      Ht Readings from Last 1 Encounters:   10/18/23 1.626 m (5' 4\")     SKIN: Visible skin clear. No significant rash, abnormal pigmentation or lesions.  EYES: Eyes grossly normal to inspection.    NECK: no visible masses; no grossly visible goiter  RESP: No audible wheeze, cough, or increased work of breathing.    NEURO: Cranial nerves grossly intact.  Awake, alert, responds appropriately to questions.  Mentation and speech " fluent.  PSYCH:affect normal,and appearance well-groomed.    DATA REVIEW   Latest Reference Range & Units 09/27/23 16:55   Calcium 8.6 - 10.0 mg/dL 9.4   Phosphorus 2.5 - 4.5 mg/dL 4.3   T4 Free 0.90 - 1.70 ng/dL 1.39   TSH 0.30 - 4.20 uIU/mL 11.87 (H)   (H): Data is abnormally high      Virtual Visit Details    Type of service:  Video Visit

## 2023-11-06 NOTE — PATIENT INSTRUCTIONS
Blood test now     I am placing orders for the schedule as shown below.        THYROGEN 2 DOSE, 131 I ABLATION OF RESIDUAL THYROID - OUTPATIENT PROTOCOL  Phone: [phone]       You should continue taking your usual dose of levothyroxine throughout the schedule noted below    Start low iodine diet < 50 mcg/day.                  WED December 20, 2023  (low iodine recipes at www.thyca.org)    Thyrogen 0.9 mg IM Dose #    Tuesday afternoon January 2, 2024  [facility] [place] ([facility] ,  Endocrinology clinic, 3rd  Floor  [address] [place] [phone]      Thyrogen 0.9 mg IM Dose # 2                     Wednesday afternoon January 3, 2024  [facility] ([facility]) ,  Endocrinology clinic, 3rd  Floor  [address] [place] [address]      Lab Work                           Before 8:30 AM, Thursday January 4 (Tg, TSH, CBC)  [facility] ([facility]) ,  First floor  [address] [place] [address]    Radioiodine dosing                           Thursday   week 3  January 4, 2023   2nd floor of [facility], Radiology Department  After this, you must go directly home, using your own vehicle.  Ideally you drive yourself. Do not go in public places.      Start radiation isolation at home               Immediately after treatment on Thursday  Stop low iodine diet       Saturday January 6 , 2024  Stop radiation isolation       Tuesday, January 9, 2024    Post therapy scan       Tuesday, week 4    2nd floor of [facility], Radiology Department    In order to minimize exposure to radiation to others from the radioiodine inside your body, you should do the following for 5 total days after radioactive iodine treatment on 1/4/2024,  ending 1/9/2024    Sleep alone.  Kissing and sexual intercourse should be avoided  Avoid prolonged close contact with young children and pregnant women.  Wash your hands with soap and water  after each visit to the bathroom  Men should sit to urinate  Flush the toilet 2 times after each use  Rinse the bathroom sink and tub after use  Flush used tissue (from blowing nose or any wipe-ups) down the toilet  Drink plenty of liquids to assist in the removal of radioactive material that are circulating in the blood stream  Use separate eating utensils and wash them separately  Use separate towels and wash cloths and wash them separately to avoid cross contamination.  Avoid public transportation       Iodine content of foods (in micrograms / serving).      You should restrict to under 50 mcg/day     Dairy  Seafood  Vegetables   Fruit    Milk (8 oz) 14.2 Clams, 1/2 c 90 Spinach, canned 9.9 Peach, canned 16 vs 5   Ice cream (1/2 cup) 9 Shrimp, 3 oz 55 Spinach, raw 2.0 applesauce 14.1   Cottage cheese, 4 oz 5.7 Oysters, 5-8 38 Potato, skin 3.3 Pineapple, 3 c 10.7   Mayonnaise, 1 Tbs 3.4 Halibut, 3 oz 39 Squash, small 3.3 Banana, 1 small 7   Cheese, American processed 2.6 [place], 3 oz 31 Lettuce, 1/2 cup 3.1 Apple, raw, 1 small 2.0   Cheese, blue/[place], 1 oz 2.6 Sardines, 2 10 Potatoes, mashed 2.5 Apple juice 2   Cheese, Cheddar, 1 oz 2.6 Tuna, canned, 2 oz 10 Brocoli, 1/2 cup 2.1 cantaloupe 1.7   Cheese sauce, 2 Tbs 2.5   Peas, 1/2 c 2.1 Orange juice 1.0    Milk, dry nonfat soilds, instant, 4 Tbs 2.0 Starch, Bread  French fries, 10 4.6 raisins 0.7   sherbet 1.6 Pancake, 2 4.5 Avocado, 1 med 1.7 oranges 0.0   Cream cheese, 1 oz 0.9 Rye bread 4.2 cucumber 1.7 Legumes and nuts    Half and half, 1 Tbs 0.9   Apple pie 3.2 Potato, boiled 1.7 Beans, red 2.1   Cream, light 0.7 Bisquick 2.6 Corn, creamed, 3 oz 1.4 Split pea soup 1.7     Whole wheat bread 1.7 Peas, canned 1/2 c 1.4 Peanuts with skin 1.4   Other  Miscellaneous  cornbread 1.7 Cabbage, raw 1.1 Cashews, 6-8 0.4   pizza 15 Pound cake 1.7 Onions 1/4 c 1.1 Walnuts, 8 halves 0.4   Chili 9.3 Dough cake 1.7 Mixed vegetables, 1/2 c 0.7     Pork, lean, 3 oz 7.1  Nigerian bread 1.5       Egg, one hardboiled 6.4 Aamir food cake 1.1       Spaghetti, 1/2 c and meatballs 5.5 Egg noodles 0.7       Beef, howard, 3 oz 4.2 Rice, whole 1/2 cup 0.6       Hamburger, 3 oz 4.2         Turkey, 3 oz 4.2 Spaghetti, 1/2 cup 0.6       Veal, 3 oz 4.2 Lambert crackers 0.1       Meyer cured 3.6         Salami, 1 oz 1.7         Liver, 3 oz  2.1         Lamb leg, 3 oz 2.1         Pork sausage, 2 links 2.7         Frankfurter  3.0

## 2023-11-07 ENCOUNTER — TELEPHONE (OUTPATIENT)
Dept: ENDOCRINOLOGY | Facility: CLINIC | Age: 38
End: 2023-11-07
Payer: COMMERCIAL

## 2023-11-07 NOTE — TELEPHONE ENCOUNTER
[employer] message sent for schedule confirmation.   Screening criteria signed and faxed to [employer]  Thyrogen PA is on hold:    [facility] Harpreet:   Diet/Isolation precautions sent via [employer].   Pt notified of final schedule.   Lis Mueller RN on 11/7/2023 at 8:10 AM     I-131 Schedule:     You should continue taking your usual dose of levothyroxine throughout the schedule noted below     Start low iodine diet < 50 mcg/day.                  WED Dec 20, 2023  (low iodine recipes at www.thyca.org)     Thyrogen 0.9 mg IM Dose #                              TUE Jan 2, 2024 2:00PM confirmed  [facility] ([facility] ,  Endocrinology clinic, 3rd  Floor  [address] [place] [phone]        Thyrogen 0.9 mg IM Dose # 2                     WED Iban 3, 2024 2:00PM confirmed   [facility] ([facility]) ,  Endocrinology clinic, 3rd  Floor  [address] [place] [address]        Lab Work                                                       THUR Jan 4 2024 8:00AM confirmed   (Tg, TSH, CBC)  [facility] ([facility] [place] [facility]) ,  First floor  [address] [place] [phone]     Radioiodine dosing                                               Thur Jan 4, 2024 10:00AM confirmed   2nd floor of [facility], Radiology Department  After this, you must go directly home, using your own vehicle.  Ideally you drive yourself. Do not go in public places.        Start radiation isolation at home               Immediately after treatment on Thursday  Stop low iodine diet                                            Saturday January 6 , 2024  Stop radiation isolation                                                  Tuesday, January 9, 2024     Post therapy scan                                                  Tues JAN 09, 2024 10:00AM confirmed     2nd floor of [facility], Radiology Department

## 2023-11-13 ENCOUNTER — TELEPHONE (OUTPATIENT)
Dept: ENDOCRINOLOGY | Facility: CLINIC | Age: 38
End: 2023-11-13
Payer: COMMERCIAL

## 2023-11-13 NOTE — TELEPHONE ENCOUNTER
Patient Contacted to schedule the following:    Appointment type: Return Thyroid Cancer  Provider: Leonor  Return date: May 2024 (ok to use Return POONAM per Dr. Galvez)  Specialty phone number: [phone]  Additional appointment(s) needed: Labs first available  Additional Notes: Find POOANM manually using the Schedules view in [employer]. Please make sure visit mode matches POONAM (ex: do not schedule an in person visit in a Virtual POONAM)    Spoke with wife Helen. Scheduled labs on 11/17/2023, follow-up on 5/28/2024.    Rolando Moore on 11/13/2023 at 12:49 PM

## 2023-11-14 NOTE — PROGRESS NOTES
[facility]   at the [facility]   Otolaryngology Clinic     Patient: Leonid Mcmillan    MRN: [id number]    : 1985    Age/Gender: 38 year old male  Date of Service: 2023  Rendering Provider:   Tessy Dale MD     Chief Complaint   Right vocal cord paralysis  Dysphonia  S/p voice gel laryngoplasty 10/26/23  Interval History   HISTORY OF PRESENT ILLNESS: Mr. Mcmillan is a 38 year old male is being followed for dysphonia. he was initially seen on 10/26/23. Please refer to this note for full history.   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 He will be undergoing adjuvant radiation therapy.     Today, he presents via  for follow up. he reports:  - sounds like his voice is back to normal  - no pain with voice usage  - breathing is doing good  - starting to notice a little change with his swallowing since the injection     PAST MEDICAL HISTORY:   Past Medical History:   Diagnosis Date    Infection due to 2019 novel coronavirus 2023    Papillary thyroid carcinoma (H) 2023    Postoperative hypothyroidism 2023    Thyroid mass 2014       PAST SURGICAL HISTORY:   Past Surgical History:   Procedure Laterality Date    BRONCHOSCOPY FLEXIBLE AND RIGID N/A 2023    Procedure: Flexible bronchoscopy, direct laryngoscopy;  Surgeon: Antonio Morales MD;  Location: [facility] OR    ESOPHAGOSCOPY N/A 2023    Procedure: ESOPHAGOSCOPY;  Surgeon: Antonio Morales MD;  Location: [facility]    ESOPHAGOSCOPY N/A 2023    Procedure: Esophagoscopy;  Surgeon: Antonio Morales MD;  Location: [facility]    LARYNGOSCOPY, BRONCHOSCOPY, COMBINED N/A 2023    Procedure: direct laryngoscopy, flexible Bronchoscopy;  Surgeon: Antonio Morales MD;  Location: [facility] OR    RESECT TRACHEA WITH RECONSTRUCTION N/A 2023    Procedure:  tracheal resection;  Surgeon: Pieter Ballard MD;  Location: [facility] OR    SHOULDER SURGERY Right 2005    shoulder injury, did not have general anesthesia    THYROIDECTOMY N/A 8/24/2023    Procedure: total thyroidectomy;  Surgeon: Antonio Morales MD;  Location: [facility] OR       CURRENT MEDICATIONS:   Current Outpatient Medications:     levothyroxine (SYNTHROID/LEVOTHROID) 112 MCG tablet, MON to FRI 1 tablet/day; SAT and SUN 1.5 tablet /day, Disp: 96 tablet, Rfl: 4    Current Facility-Administered Medications:     [START ON 1/2/2024] thyrotropin (THYROGEN) injection 0.9 mg, 0.9 mg, Intramuscular, Q24H, Leslie Galvez MD    ALLERGIES: Patient has no known allergies.    SOCIAL HISTORY:    Social History     Socioeconomic History    Marital status:      Spouse name: Not on file    Number of children: Not on file    Years of education: Not on file    Highest education level: Not on file   Occupational History    Not on file   Tobacco Use    Smoking status: Never     Passive exposure: Never    Smokeless tobacco: Never   Vaping Use    Vaping Use: Never used   Substance and Sexual Activity    Alcohol use: Never    Drug use: Never    Sexual activity: Not on file   Other Topics Concern    Not on file   Social History Narrative    Moved here in 2014     Social Determinants of Health     Financial Resource Strain: Low Risk  (10/18/2023)    Financial Resource Strain     Within the past 12 months, have you or your family members you live with been unable to get utilities (heat, electricity) when it was really needed?: No   Food Insecurity: Low Risk  (10/18/2023)    Food Insecurity     Within the past 12 months, did you worry that your food would run out before you got money to buy more?: No     Within the past 12 months, did the food you bought just not last and you didn t have money to get more?: No   Transportation Needs: Low Risk  (10/18/2023)    Transportation Needs     Within the past 12 months, has lack of  transportation kept you from medical appointments, getting your medicines, non-medical meetings or appointments, work, or from getting things that you need?: No   Physical Activity: Not on file   Stress: Not on file   Social Connections: Not on file   Interpersonal Safety: Low Risk  (10/18/2023)    Interpersonal Safety     Do you feel physically and emotionally safe where you currently live?: Yes     Within the past 12 months, have you been hit, slapped, kicked or otherwise physically hurt by someone?: No     Within the past 12 months, have you been humiliated or emotionally abused in other ways by your partner or ex-partner?: No   Housing Stability: Low Risk  (10/18/2023)    Housing Stability     Do you have housing? : Yes     Are you worried about losing your housing?: No         FAMILY HISTORY:   Family History   Problem Relation Age of Onset    Diabetes Father     Anesthesia Reaction No family hx of     Venous thrombosis No family hx of     Myocardial Infarction No family hx of     Cerebrovascular Disease No family hx of     Thyroid Cancer No family hx of     Goiter No family hx of     Cancer No family hx of       Non-contributory for problems with anesthesia    REVIEW OF SYSTEMS:   The patient was asked a 14 point review of systems regarding constitutional symptoms, eye symptoms, ears, nose, mouth, throat symptoms, cardiovascular symptoms, respiratory symptoms, gastrointestinal symptoms, genitourinary symptoms, musculoskeletal symptoms, integumentary symptoms, neurological symptoms, psychiatric symptoms, endocrine symptoms, hematologic/lymphatic symptoms, and allergic/ immunologic symptoms.   The pertinent factors have been included in the HPI and below.  Patient Supplied Answers to Review of Systems      10/26/2023     7:47 AM   [facility] ROS   Constitutional Weight gain   Neurology Unexplained weakness   Ears, Nose, Throat Hoarseness       Physical Examination   The patient underwent a physical examination as  described below. The pertinent positive and negative findings are summarized after the description of the examination.  Constitutional: The patient's developmental and nutritional status was assessed. The patient's voice quality was assessed.  Head and Face: The head and face were inspected for deformities. The sinuses were palpated. The salivary glands were palpated. Facial muscle strength was assessed bilaterally.  Eyes: Extraocular movements and primary gaze alignment were assessed.  Ears, Nose, Mouth and Throat: The ears and nose were examined for deformities. The nasal septum, mucosa, and turbinates were inspected by anterior rhinoscopy. The lips, teeth, and gums were examined for abnormalities. The oral mucosa, tongue, palate, tonsils, lateral and posterior pharynx were inspected for the presence of asymmetry or mucosal lesions.    Neck: The tracheal position was noted, and the neck mass palpated to determine if there were any asymmetries, abnormal neck masses, thyromegally, or thyroid nodules.  Respiratory: The nature of the breathing and chest expansion/symmetry was observed.  Cardiovascular: The patient was examined to determine the presence of any edema or jugular venous distension.  Abdomen: The contour of the abdomen was noted.  Lymphatic: The patient was examined for infraclavicular lymphadenopathy.  Musculoskeletal: The patient was inspected for the presence of skeletal deformities.  Extremities: The extremities were examined for any clubbing or cyanosis.  Skin: The skin was examined for inflammatory or neoplastic conditions.  Neurologic: The patient's orientation, mood, and affect were noted. The cranial nerve  functions were examined.  Other pertinent positive and negative findings on physical examination:   OC/OP: no lesions, uvula midline, soft palate elevates symmetrically   Neck: no lesions, no TH tenderness to palpation, midline neck incision well healed  All other physical examination findings  were within normal limits and noncontributory.    Procedures     Flexible laryngoscopy (CPT 34228)        Pre-procedure diagnosis: dysphonia  Post-procedure diagnosis: same as above  Indication for procedure: Mr. Mcmillan is a 38 year old male with see above  Procedure(s): Fiberoptic Laryngoscopy     Details of Procedure: After informed consent was obtained, the patient was seated in the examination chair.  The areas of the nasopharynx as well as the hypopharynx were anesthetized with topical 4% lidocaine with 0.25% phenylephrine atomizer.  Examination of the base of tongue was performed first.  Attention was directed to any evidence of masses in the area or evidence of leukoplakia or candidal infection.  Attention was directed to the epiglottis where its size and position was determined and its movement on phonation of the vowel  e .  The piriform sinuses were then inspected for any mass lesions or pooling of secretions.  Attention was then directed to the larynx. The vocal folds were inspected for infection or any areas of leukoplakia, for masses, polypoid degeneration, or hemorrhage.  Having done this, the arytenoids and vocal processes were inspected for erythema or evidence of granuloma formation.  The posterior commissure was then inspected for evidence of inflammatory changes in the mucosa and heaping up of mucosal tissue. The patient was then instructed to say the vowel  e .  Adduction of vocal folds to the midline was observed for any evidence of paresis or paralysis of the larynx or asymmetry in rotation of the larynx to the left or right. The patient was asked to breathe and the degree of abduction was noted bilaterally.  Subglottic view of the larynx was obtained for any additional mass lesions or mucosal changes.  Finally the post cricoid was examined for evidence of pooling of secretions, as well as the pharyngeal wall mucosa.   Anesthesia type: 0.25% phenylephrine     Findings:  Anatomic/physiological  deviations: RNC, right vocal fold paralysis, good glottal closure               Right vocal process: Marked restriction of mobility   Left vocal process: No restriction of mobility  Glottal gap: Glottal gap  Supraglottic structures: Normal  Hypopharynx: Normal      Estimated Blood Loss: minimal  Complications: None  Disposition: Patient tolerated the procedure well      Fiberoptic Endoscopic Evaluation of Swallowing (CPT 40693)  and Interpretation of Swallowing (CPT 33260)     Indications: See above notes for complete history and physical. Patient complains of dysphagia to both solids and liquids and/or there is suggestion on history and endoscopic exam of the presence of dysphagia causing medical complaints.  Swallowing evaluation is being performed to assess the presence and degree of dysphagia, and to recommend a safe diet.     Pulmonary Status:    No PNA   Current Diet:              regular                                                   thin liquids      Consistency Amounts:  Thin Liquid: sip   Puree: sip  Solid: cookies            Positioning: upright in a chair  Oral Peripheral Exam: See physical exam section.  Anatomic Notes: See Videostroboscopy report for assessment of anatomy and laryngeal functioning  Pharyngeal secretions prior to administration of liquid or food: No   Oral Phase Abnormal Findings: No abnormal behavior observed   Pharyngeal Phase Abnormal Findings: no penetration with liquids anymore      Recommended Diet:  regular                                        thin liquids        Review of Relevant Clinical Data   I personally reviewed:  Labs:  Lab Results   Component Value Date    TSH 11.87 (H) 09/27/2023     Lab Results   Component Value Date     08/27/2023    CO2 21 (L) 08/27/2023    BUN 6.4 08/27/2023    PHOS 4.3 09/27/2023     Lab Results   Component Value Date    WBC 5.2 08/27/2023    HGB 13.1 (L) 08/27/2023    HCT 39.5 (L) 08/27/2023    MCV 88 08/27/2023     08/27/2023  "    Lab Results   Component Value Date    PTT 29 03/03/2023    INR 1.06 03/03/2023     No results found for: \"BERLIN\"  No components found for: \"RHEUMATOIDFACTOR\", \"RF\"  No results found for: \"CRP\"  No components found for: \"CKTOT\", \"URICACID\"  No components found for: \"C3\", \"C4\", \"DSDNAAB\", \"NDNAABIFA\"  No results found for: \"MPOAB\"    Patient reported Quality of Life (QOL) Measures   Patient Supplied Answers To VHI Questionnaire      10/26/2023     8:15 AM   Voice Handicap Index (VHI-10)   My voice makes it difficult for people to hear me 2   People have difficulty understanding me in a noisy room 4   My voice difficulties restrict my personal and social life.  3   I feel left out of conversations because of my voice 2   My voice problem causes me to lose income 1   I feel as though I have to strain to produce voice 1   The clarity of my voice is unpredictable 2   My voice problem upsets me 4   My voice makes me feel handicapped 3   People ask, \"What's wrong with your voice?\" 2   VHI-10 24         Patient Supplied Answers To EAT Questionnaire      10/26/2023     8:16 AM   Eating Assessment Tool (EAT-10)   My swallowing problem has caused me to lose weight 0   My swallowing problem interferes with my ability to go out for meals 0   Swallowing liquids takes extra effort 2   Swallowing solids takes extra effort 0   Swallowing pills takes extra effort 0   Swallowing is painful 0   The pleasure of eating is affected by my swallowing 0   When I swallow food sticks in my throat 0   I cough when I eat 0   Swallowing is stressful 0   EAT-10 2         Patient Supplied Answers To CSI Questionnaire      10/26/2023     8:18 AM   Cough Severity Index (CSI)   My cough is worse when I lie down 0   My coughing problem causes me to restrict my personal and social life 0   I tend to avoid places because of my cough problem 0   I feel embarrassed because of my coughing problem 0   People ask, ''What's wrong?'' because I cough a lot 0 "   I run out of air when I cough 0   My coughing problem affects my voice 0   My coughing problem limits my physical activity 0   My coughing problem upsets me 0   People ask me if I am sick because I cough a lot 0   CSI Score 0         Patient Supplied Answers to Dyspnea Index Questionnaire:       No data to display                Impression & Plan     IMPRESSION: Mr. Mcmillan is a 38 year old male who is being seen for the following:    Dysphonia  - ongoing for 2 months  - stable  - in the setting of history of total thyroidectomy with tracheal resection on 8/24/23  - speaking voice is affected   - singing voice is affected  - no pain with voice use  - voice demand is low  - scope evaluation shows right vocal fold paralysis with glottal gap  - FEES shows penetration with thins   - discussed etiology of vocal fold paralysis in this case is due to right vocal fold paralysis and CTR  - discussed prognosis of vocal fold paralysis is permanent   - discussed options of treatment which include observation, voice therapy, temporary augmentation vs long term options  - discussed long term options of vocal fold augmentation with injection laryngoplasty with CAHA vs lipoinjection, framework surgery and reinnervation  - given he is so recent from his CTR recommend voice gel injection laryngoplasty to see if voice is improved and if this helps then will need a longer term filler   - a permanent option will be offered next summer once he is 1 year from his CTR  - discussed his pitch will always be low   - right voice gel laryngoplasty was performed 10/26/23 without complication  - symptoms 11/14/2023 are improved, feels his voice is back to normal  - scope shows right vocal fold paralysis, good glottal closure  - FEES shows no penetration anymore with liquids  - discussed if swallowing continues to get worse over the next month, will consider a long term filler lasting 6-9 months vs repeat voice gel laryngoplasty lasting 8-12 weeks  vs CAHA lasting 12 months   Plan  - pre-schedule for [facility] for repeat juvederm injection (will have to check with insurance vs voice gel vs CAHA) - RN will call in the next month to see if they want to keep this or cancel  - will need [facility]   - continue swallow precautions with thins    RETURN VISIT: follow-up with me in September of 2024 for 1 year check     Scribe Disclosure:   I, Olimpia Flores, am serving as a scribe; to document services personally performed by Tessy Dale MD -based on data collection and the provider's statements to me.     Provider Disclosure:  I agree with above History, Review of Systems, Physical exam and Plan.  I have reviewed the content of the documentation and have edited it as needed. I have personally performed the services documented here and the documentation accurately represents those services and the decisions I have made.      Tessy Dale MD    Laryngology    [facility]  Department of  Otolaryngology - Head and Neck Surgery  [facility] [place] [address]  Appointment line: [phone]  Fax: [phone]  [email or web address]

## 2023-11-16 ENCOUNTER — HOSPITAL ENCOUNTER (OUTPATIENT)
Facility: AMBULATORY SURGERY CENTER | Age: 38
Discharge: HOME | End: 2023-11-16
Attending: OTOLARYNGOLOGY
Payer: COMMERCIAL

## 2023-11-16 ENCOUNTER — OFFICE VISIT (OUTPATIENT)
Dept: OTOLARYNGOLOGY | Facility: CLINIC | Age: 38
End: 2023-11-16
Payer: COMMERCIAL

## 2023-11-16 ENCOUNTER — THERAPY VISIT (OUTPATIENT)
Dept: SPEECH THERAPY | Facility: CLINIC | Age: 38
End: 2023-11-16
Payer: COMMERCIAL

## 2023-11-16 VITALS — SYSTOLIC BLOOD PRESSURE: 134 MMHG | HEART RATE: 65 BPM | DIASTOLIC BLOOD PRESSURE: 87 MMHG

## 2023-11-16 DIAGNOSIS — J38.01 VOCAL FOLD PARALYSIS, RIGHT: Primary | ICD-10-CM

## 2023-11-16 DIAGNOSIS — J38.01 VOCAL FOLD PARALYSIS, RIGHT: ICD-10-CM

## 2023-11-16 DIAGNOSIS — R49.0 DYSPHONIA: Primary | ICD-10-CM

## 2023-11-16 DIAGNOSIS — E04.1 THYROID NODULE: ICD-10-CM

## 2023-11-16 DIAGNOSIS — R13.12 OROPHARYNGEAL DYSPHAGIA: ICD-10-CM

## 2023-11-16 DIAGNOSIS — C73 THYROID CANCER (H): Primary | ICD-10-CM

## 2023-11-16 PROCEDURE — 92613 ENDOSCOPY SWALLOW (FEES) I&R: CPT | Performed by: OTOLARYNGOLOGY

## 2023-11-16 PROCEDURE — 92526 ORAL FUNCTION THERAPY: CPT | Mod: GN | Performed by: SPEECH-LANGUAGE PATHOLOGIST

## 2023-11-16 PROCEDURE — 99207 PR NO BILLABLE SERVICE THIS VISIT: CPT | Performed by: SPEECH-LANGUAGE PATHOLOGIST

## 2023-11-16 PROCEDURE — 92612 ENDOSCOPY SWALLOW (FEES) VID: CPT | Mod: GN | Performed by: OTOLARYNGOLOGY

## 2023-11-16 PROCEDURE — 99214 OFFICE O/P EST MOD 30 MIN: CPT | Mod: 25 | Performed by: OTOLARYNGOLOGY

## 2023-11-16 ASSESSMENT — PAIN SCALES - GENERAL: PAINLEVEL: NO PAIN (0)

## 2023-11-16 NOTE — LETTER
2023       RE: Leonid Mcmillan  [address] [place] [address]     Dear Colleague,    Thank you for referring your patient, Leonid Mcmillan, to the [facility] EAR NOSE AND THROAT [facility]INIC [place] at [facility]. Please see a copy of my visit note below.        [facility]   at the [facility]   Otolaryngology Clinic     Patient: Leonid Mcmillan    MRN: [id number]    : 1985    Age/Gender: 38 year old male  Date of Service: 2023  Rendering Provider:   Tessy Dale MD     Chief Complaint   Right vocal cord paralysis  Dysphonia  S/p voice gel laryngoplasty 10/26/23  Interval History   HISTORY OF PRESENT ILLNESS: Mr. Mcmillan is a 38 year old male is being followed for dysphonia. he was initially seen on 10/26/23. Please refer to this note for full history.   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 He will be undergoing adjuvant radiation therapy.     Today, he presents via  for follow up. he reports:  - sounds like his voice is back to normal  - no pain with voice usage  - breathing is doing good  - starting to notice a little change with his swallowing since the injection     PAST MEDICAL HISTORY:   Past Medical History:   Diagnosis Date    Infection due to 2019 novel coronavirus 2023    Papillary thyroid carcinoma (H) 2023    Postoperative hypothyroidism 2023    Thyroid mass 2014       PAST SURGICAL HISTORY:   Past Surgical History:   Procedure Laterality Date    BRONCHOSCOPY FLEXIBLE AND RIGID N/A 2023    Procedure: Flexible bronchoscopy, direct laryngoscopy;  Surgeon: Antonio Morales MD;  Location: [facility] OR    ESOPHAGOSCOPY N/A 2023    Procedure: ESOPHAGOSCOPY;  Surgeon: Antonio Morales MD;  Location: [facility]    ESOPHAGOSCOPY N/A 2023     Procedure: Esophagoscopy;  Surgeon: Antonio Morales MD;  Location: [facility]    LARYNGOSCOPY, BRONCHOSCOPY, COMBINED N/A 8/24/2023    Procedure: direct laryngoscopy, flexible Bronchoscopy;  Surgeon: Antonio Morales MD;  Location: [facility] OR    RESECT TRACHEA WITH RECONSTRUCTION N/A 8/24/2023    Procedure: tracheal resection;  Surgeon: Pieter Ballard MD;  Location: [facility] OR    SHOULDER SURGERY Right 2005    shoulder injury, did not have general anesthesia    THYROIDECTOMY N/A 8/24/2023    Procedure: total thyroidectomy;  Surgeon: Antonio Morales MD;  Location: [facility] OR       CURRENT MEDICATIONS:   Current Outpatient Medications:     levothyroxine (SYNTHROID/LEVOTHROID) 112 MCG tablet, MON to FRI 1 tablet/day; SAT and SUN 1.5 tablet /day, Disp: 96 tablet, Rfl: 4    Current Facility-Administered Medications:     [START ON 1/2/2024] thyrotropin (THYROGEN) injection 0.9 mg, 0.9 mg, Intramuscular, Q24H, Leslie Galvez MD    ALLERGIES: Patient has no known allergies.    SOCIAL HISTORY:    Social History     Socioeconomic History    Marital status:      Spouse name: Not on file    Number of children: Not on file    Years of education: Not on file    Highest education level: Not on file   Occupational History    Not on file   Tobacco Use    Smoking status: Never     Passive exposure: Never    Smokeless tobacco: Never   Vaping Use    Vaping Use: Never used   Substance and Sexual Activity    Alcohol use: Never    Drug use: Never    Sexual activity: Not on file   Other Topics Concern    Not on file   Social History Narrative    Moved here in 2014     Social Determinants of Health     Financial Resource Strain: Low Risk  (10/18/2023)    Financial Resource Strain     Within the past 12 months, have you or your family members you live with been unable to get utilities (heat, electricity) when it was really needed?: No   Food Insecurity: Low Risk  (10/18/2023)    Food Insecurity     Within the past 12 months, did  you worry that your food would run out before you got money to buy more?: No     Within the past 12 months, did the food you bought just not last and you didn t have money to get more?: No   Transportation Needs: Low Risk  (10/18/2023)    Transportation Needs     Within the past 12 months, has lack of transportation kept you from medical appointments, getting your medicines, non-medical meetings or appointments, work, or from getting things that you need?: No   Physical Activity: Not on file   Stress: Not on file   Social Connections: Not on file   Interpersonal Safety: Low Risk  (10/18/2023)    Interpersonal Safety     Do you feel physically and emotionally safe where you currently live?: Yes     Within the past 12 months, have you been hit, slapped, kicked or otherwise physically hurt by someone?: No     Within the past 12 months, have you been humiliated or emotionally abused in other ways by your partner or ex-partner?: No   Housing Stability: Low Risk  (10/18/2023)    Housing Stability     Do you have housing? : Yes     Are you worried about losing your housing?: No         FAMILY HISTORY:   Family History   Problem Relation Age of Onset    Diabetes Father     Anesthesia Reaction No family hx of     Venous thrombosis No family hx of     Myocardial Infarction No family hx of     Cerebrovascular Disease No family hx of     Thyroid Cancer No family hx of     Goiter No family hx of     Cancer No family hx of       Non-contributory for problems with anesthesia    REVIEW OF SYSTEMS:   The patient was asked a 14 point review of systems regarding constitutional symptoms, eye symptoms, ears, nose, mouth, throat symptoms, cardiovascular symptoms, respiratory symptoms, gastrointestinal symptoms, genitourinary symptoms, musculoskeletal symptoms, integumentary symptoms, neurological symptoms, psychiatric symptoms, endocrine symptoms, hematologic/lymphatic symptoms, and allergic/ immunologic symptoms.   The pertinent factors  have been included in the HPI and below.  Patient Supplied Answers to Review of Systems      10/26/2023     7:47 AM   [facility] ROS   Constitutional Weight gain   Neurology Unexplained weakness   Ears, Nose, Throat Hoarseness       Physical Examination   The patient underwent a physical examination as described below. The pertinent positive and negative findings are summarized after the description of the examination.  Constitutional: The patient's developmental and nutritional status was assessed. The patient's voice quality was assessed.  Head and Face: The head and face were inspected for deformities. The sinuses were palpated. The salivary glands were palpated. Facial muscle strength was assessed bilaterally.  Eyes: Extraocular movements and primary gaze alignment were assessed.  Ears, Nose, Mouth and Throat: The ears and nose were examined for deformities. The nasal septum, mucosa, and turbinates were inspected by anterior rhinoscopy. The lips, teeth, and gums were examined for abnormalities. The oral mucosa, tongue, palate, tonsils, lateral and posterior pharynx were inspected for the presence of asymmetry or mucosal lesions.    Neck: The tracheal position was noted, and the neck mass palpated to determine if there were any asymmetries, abnormal neck masses, thyromegally, or thyroid nodules.  Respiratory: The nature of the breathing and chest expansion/symmetry was observed.  Cardiovascular: The patient was examined to determine the presence of any edema or jugular venous distension.  Abdomen: The contour of the abdomen was noted.  Lymphatic: The patient was examined for infraclavicular lymphadenopathy.  Musculoskeletal: The patient was inspected for the presence of skeletal deformities.  Extremities: The extremities were examined for any clubbing or cyanosis.  Skin: The skin was examined for inflammatory or neoplastic conditions.  Neurologic: The patient's orientation, mood, and affect were noted. The cranial  nerve  functions were examined.  Other pertinent positive and negative findings on physical examination:   OC/OP: no lesions, uvula midline, soft palate elevates symmetrically   Neck: no lesions, no TH tenderness to palpation, midline neck incision well healed  All other physical examination findings were within normal limits and noncontributory.    Procedures     Flexible laryngoscopy (CPT 22557)        Pre-procedure diagnosis: dysphonia  Post-procedure diagnosis: same as above  Indication for procedure: Mr. Mcmillan is a 38 year old male with see above  Procedure(s): Fiberoptic Laryngoscopy     Details of Procedure: After informed consent was obtained, the patient was seated in the examination chair.  The areas of the nasopharynx as well as the hypopharynx were anesthetized with topical 4% lidocaine with 0.25% phenylephrine atomizer.  Examination of the base of tongue was performed first.  Attention was directed to any evidence of masses in the area or evidence of leukoplakia or candidal infection.  Attention was directed to the epiglottis where its size and position was determined and its movement on phonation of the vowel  e .  The piriform sinuses were then inspected for any mass lesions or pooling of secretions.  Attention was then directed to the larynx. The vocal folds were inspected for infection or any areas of leukoplakia, for masses, polypoid degeneration, or hemorrhage.  Having done this, the arytenoids and vocal processes were inspected for erythema or evidence of granuloma formation.  The posterior commissure was then inspected for evidence of inflammatory changes in the mucosa and heaping up of mucosal tissue. The patient was then instructed to say the vowel  e .  Adduction of vocal folds to the midline was observed for any evidence of paresis or paralysis of the larynx or asymmetry in rotation of the larynx to the left or right. The patient was asked to breathe and the degree of abduction was noted  bilaterally.  Subglottic view of the larynx was obtained for any additional mass lesions or mucosal changes.  Finally the post cricoid was examined for evidence of pooling of secretions, as well as the pharyngeal wall mucosa.   Anesthesia type: 0.25% phenylephrine     Findings:  Anatomic/physiological deviations: RNC, right vocal fold paralysis, good glottal closure               Right vocal process: Marked restriction of mobility   Left vocal process: No restriction of mobility  Glottal gap: Glottal gap  Supraglottic structures: Normal  Hypopharynx: Normal      Estimated Blood Loss: minimal  Complications: None  Disposition: Patient tolerated the procedure well      Fiberoptic Endoscopic Evaluation of Swallowing (CPT 77268)  and Interpretation of Swallowing (CPT 00089)     Indications: See above notes for complete history and physical. Patient complains of dysphagia to both solids and liquids and/or there is suggestion on history and endoscopic exam of the presence of dysphagia causing medical complaints.  Swallowing evaluation is being performed to assess the presence and degree of dysphagia, and to recommend a safe diet.     Pulmonary Status:    No PNA   Current Diet:              regular                                                   thin liquids      Consistency Amounts:  Thin Liquid: sip   Puree: sip  Solid: cookies            Positioning: upright in a chair  Oral Peripheral Exam: See physical exam section.  Anatomic Notes: See Videostroboscopy report for assessment of anatomy and laryngeal functioning  Pharyngeal secretions prior to administration of liquid or food: No   Oral Phase Abnormal Findings: No abnormal behavior observed   Pharyngeal Phase Abnormal Findings: no penetration with liquids anymore      Recommended Diet:  regular                                        thin liquids        Review of Relevant Clinical Data   I personally reviewed:  Labs:  Lab Results   Component Value Date    TSH 11.87  "(H) 09/27/2023     Lab Results   Component Value Date     08/27/2023    CO2 21 (L) 08/27/2023    BUN 6.4 08/27/2023    PHOS 4.3 09/27/2023     Lab Results   Component Value Date    WBC 5.2 08/27/2023    HGB 13.1 (L) 08/27/2023    HCT 39.5 (L) 08/27/2023    MCV 88 08/27/2023     08/27/2023     Lab Results   Component Value Date    PTT 29 03/03/2023    INR 1.06 03/03/2023     No results found for: \"BERLIN\"  No components found for: \"RHEUMATOIDFACTOR\", \"RF\"  No results found for: \"CRP\"  No components found for: \"CKTOT\", \"URICACID\"  No components found for: \"C3\", \"C4\", \"DSDNAAB\", \"NDNAABIFA\"  No results found for: \"MPOAB\"    Patient reported Quality of Life (QOL) Measures   Patient Supplied Answers To VHI Questionnaire      10/26/2023     8:15 AM   Voice Handicap Index (VHI-10)   My voice makes it difficult for people to hear me 2   People have difficulty understanding me in a noisy room 4   My voice difficulties restrict my personal and social life.  3   I feel left out of conversations because of my voice 2   My voice problem causes me to lose income 1   I feel as though I have to strain to produce voice 1   The clarity of my voice is unpredictable 2   My voice problem upsets me 4   My voice makes me feel handicapped 3   People ask, \"What's wrong with your voice?\" 2   VHI-10 24         Patient Supplied Answers To EAT Questionnaire      10/26/2023     8:16 AM   Eating Assessment Tool (EAT-10)   My swallowing problem has caused me to lose weight 0   My swallowing problem interferes with my ability to go out for meals 0   Swallowing liquids takes extra effort 2   Swallowing solids takes extra effort 0   Swallowing pills takes extra effort 0   Swallowing is painful 0   The pleasure of eating is affected by my swallowing 0   When I swallow food sticks in my throat 0   I cough when I eat 0   Swallowing is stressful 0   EAT-10 2         Patient Supplied Answers To CSI Questionnaire      10/26/2023     8:18 AM "   Cough Severity Index (CSI)   My cough is worse when I lie down 0   My coughing problem causes me to restrict my personal and social life 0   I tend to avoid places because of my cough problem 0   I feel embarrassed because of my coughing problem 0   People ask, ''What's wrong?'' because I cough a lot 0   I run out of air when I cough 0   My coughing problem affects my voice 0   My coughing problem limits my physical activity 0   My coughing problem upsets me 0   People ask me if I am sick because I cough a lot 0   CSI Score 0         Patient Supplied Answers to Dyspnea Index Questionnaire:       No data to display                Impression & Plan     IMPRESSION: Mr. Mcmillan is a 38 year old male who is being seen for the following:    Dysphonia  - ongoing for 2 months  - stable  - in the setting of history of total thyroidectomy with tracheal resection on 8/24/23  - speaking voice is affected   - singing voice is affected  - no pain with voice use  - voice demand is low  - scope evaluation shows right vocal fold paralysis with glottal gap  - FEES shows penetration with thins   - discussed etiology of vocal fold paralysis in this case is due to right vocal fold paralysis and CTR  - discussed prognosis of vocal fold paralysis is permanent   - discussed options of treatment which include observation, voice therapy, temporary augmentation vs long term options  - discussed long term options of vocal fold augmentation with injection laryngoplasty with CAHA vs lipoinjection, framework surgery and reinnervation  - given he is so recent from his CTR recommend voice gel injection laryngoplasty to see if voice is improved and if this helps then will need a longer term filler   - a permanent option will be offered next summer once he is 1 year from his CTR  - discussed his pitch will always be low   - right voice gel laryngoplasty was performed 10/26/23 without complication  - symptoms 11/14/2023 are improved, feels his voice is  back to normal  - scope shows right vocal fold paralysis, good glottal closure  - FEES shows no penetration anymore with liquids  - discussed if swallowing continues to get worse over the next month, will consider a long term filler lasting 6-9 months vs repeat voice gel laryngoplasty lasting 8-12 weeks vs CAHA lasting 12 months   Plan  - pre-schedule for [facility] for repeat juvederm injection (will have to check with insurance vs voice gel vs CAHA) - RN will call in the next month to see if they want to keep this or cancel  - will need [facility]   - continue swallow precautions with thins    RETURN VISIT: follow-up with me in September of 2024 for 1 year check     Scribe Disclosure:   I, Olimpia Flores, am serving as a scribe; to document services personally performed by Tessy Dale MD -based on data collection and the provider's statements to me.     Provider Disclosure:  I agree with above History, Review of Systems, Physical exam and Plan.  I have reviewed the content of the documentation and have edited it as needed. I have personally performed the services documented here and the documentation accurately represents those services and the decisions I have made.      Tessy Dale MD    Laryngology    [facility]  Department of  Otolaryngology - Head and Neck Surgery  Clinics & Surgery Center  [address] [place] [address]  Appointment line: [phone]  Fax: [phone]  [email or web address]

## 2023-11-16 NOTE — PATIENT INSTRUCTIONS
1.  You were seen in the ENT Clinic today by . If you have any questions or concerns after your appointment, please call [phone]. Press option #1 for scheduling related needs. Press option #3 for Nurse advice.    2.  Plan is to return to clinic in september      Bernice Guo LPN  [phone]  [facility] - Otolaryngology

## 2023-11-16 NOTE — PROGRESS NOTES
"[facility]  Clinical Voice and Upper Airway Evaluation Report    Patient's Name: Leonid Mcmillan  Date of Evaluation: 11/16/2023  Providing SLP: Leona Loco MS CCC-SLP  Seen in Conjunction With: Tessy Dale MD - Tara Erazo, CCC-SLP  Referring Provider and Facility: Antonio Morales MD - Otolaryngology  Insurance Coverage: [facility] Out of State  Chief Complaint: Dysphonia, Dysphagia, Dyspnea  Evaluation Location: 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 Perceptually, his voice demonstrated moderate to marked roughness (diplophonia), strain, weakness, and breathiness. Laryngoscopy today demonstrated R vocal fold immobility in a paramedian position with incomplete glottic closure and mediolateral compression with phonation with the L ventricular fold. Therefore, no voice therapy interventions are recommended at this time. I assisted Dr. Dale with a Prolaryn injection into the R vocal fold today, and Leonid's voice was free of diplophonia during phonation attempts following the procedure. His wife reported that his voice sounded \"normal\" afterwards. Leonid will return to see Dr. Dale as needed. He is in agreement with this plan of care.        Updated patient history:    Leonid reports that his voice was sounding louder and clearer after his vocal fold augmentation but not fully back to his pre-thyroidectomy voice. His wife notes that it has began getting quieter recently. He notes voice breaks with increased volume and difficulties coughing up mucus. His wife endorses very loud snoring, which " "is new for him after the injection. With regard to swallowing, he has had little to no coughing with drinking since the augmentation; however, he notes slight, gradual changes to his swallowing compared to immediately after.      Quality of Life Questionnaires:    Patient Supplied Answers To Last 2 VHI Questionnaires      10/26/2023     8:15 AM   Voice Handicap Index (VHI-10)   My voice makes it difficult for people to hear me 2   People have difficulty understanding me in a noisy room 4   My voice difficulties restrict my personal and social life.  3   I feel left out of conversations because of my voice 2   My voice problem causes me to lose income 1   I feel as though I have to strain to produce voice 1   The clarity of my voice is unpredictable 2   My voice problem upsets me 4   My voice makes me feel handicapped 3   People ask, \"What's wrong with your voice?\" 2   VHI-10 24     Patient Supplied Answers To Last 2 CSI Questionnaires      10/26/2023     8:18 AM   Cough Severity Index (CSI)   My cough is worse when I lie down 0   My coughing problem causes me to restrict my personal and social life 0   I tend to avoid places because of my cough problem 0   I feel embarrassed because of my coughing problem 0   People ask, ''What's wrong?'' because I cough a lot 0   I run out of air when I cough 0   My coughing problem affects my voice 0   My coughing problem limits my physical activity 0   My coughing problem upsets me 0   People ask me if I am sick because I cough a lot 0   CSI Score 0     Patient Supplied Answers To Last 2 EAT Questionnaires      10/26/2023     8:16 AM   Eating Assessment Tool (EAT-10)   My swallowing problem has caused me to lose weight 0   My swallowing problem interferes with my ability to go out for meals 0   Swallowing liquids takes extra effort 2   Swallowing solids takes extra effort 0   Swallowing pills takes extra effort 0   Swallowing is painful 0   The pleasure of eating is affected by " my swallowing 0   When I swallow food sticks in my throat 0   I cough when I eat 0   Swallowing is stressful 0   EAT-10 2       Laryngoscopy without stroboscopy:    Provider performing exam: Tessy Dale MD    Informed consent: Informed verbal consent was obtained, which includes potential side-effects, risks, and benefits of the procedure.     Anesthetic: Topical anesthesia with 3% lidocaine and 0.25% phenylephrine was applied the nostrils bilaterally. Viscous lidocaine 4% was applied to the tip of the endoscope.    Scope type: A distal chip flexible laryngoscope was passed through the nare with halogen light source(s).    The laryngeal and pharyngeal structures were evaluated for gross appearance, mobility, function, and focal lesions / abnormalities of the associated mucosa.  Velar Function: not assessed today  General Appearance: slight erythema of the arytenoid complexes  Secretions: WNL  Subglottis Appearance: visible portion is patent   R Arytenoid Abduction / Adduction: continued immobile from a paramedian position  L Arytenoid Abduction / Adduction: timely ab/adduction with appropriate range of motion  Mediolateral Compression: mild of the R ventricular fold  Anteroposterior Compression: WNL   Vocal Fold Elongation: reduced and improved pitch variation abilities noted today compared to previous laryngoscopy  Left (L) Vocal Fold Edge and Mucosa:  White with smooth and straight appearance   Increased redness at the anterior commissure  Right (R) Vocal Fold Edge and Mucosa: white with smooth, straight appearance  White with smooth, straight appearance  Improved bulk of the vocal fold  Grooved appearance along the vibratory edge not observed today  Increased redness at the anterior commissure  Narrow Band Imaging: not utilized today  Glottic Closure: improved today under halogen light source     FEES: Evaluation was performed by Tara Erazo and Dr. Dale. Impressions from this clinical document indicates:  "\"thin liquid PO trials under MD laryngoscopy reveal no penetration/aspiration with single or large, serial sips. 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 Perceptually, his voice is louder and clearer than before his vocal fold augmentation with moderate weakness and slight, intermittent diplophonia. Laryngoscopy today demonstrated continued R vocal fold immobility in a paramedian position with improved glottic closure. If further voice intervention is needed, this will take place in 2024 with a Juvederm augmentation with Dr. Dale after his radioactive iodine treatments. At this time, no further voice therapy services are needed. Leonid is in agreement with this plan of care.       Billed Procedures:    No charge facility fee, as no skilled services were provided today  Assessment and treatment time: 14 minutes  Chart review, interpretation of testing, documentation preparation, etc: 15 minutes      Thank you for allowing me to participate in this patient's care,    Leona Loco MS CCC-SLP  Speech-Language Pathologist  [facility]  Department of Otolaryngology - Head and Neck Surgery  [facility]  [email or web address]  Direct: [phone]  [phone]    *This note may have been completed using [id number] dictation software, so errors may exist. Please contact me for clarification if needed*  "

## 2023-11-17 ENCOUNTER — LAB (OUTPATIENT)
Dept: LAB | Facility: CLINIC | Age: 38
End: 2023-11-17
Payer: COMMERCIAL

## 2023-11-17 ENCOUNTER — PATIENT OUTREACH (OUTPATIENT)
Dept: OTOLARYNGOLOGY | Facility: CLINIC | Age: 38
End: 2023-11-17

## 2023-11-17 DIAGNOSIS — Z77.098 EXPOSURE TO IODINE: ICD-10-CM

## 2023-11-17 DIAGNOSIS — C73 PAPILLARY THYROID CARCINOMA (H): ICD-10-CM

## 2023-11-17 DIAGNOSIS — E89.0 POSTOPERATIVE HYPOTHYROIDISM: ICD-10-CM

## 2023-11-17 LAB
ERYTHROCYTE [DISTWIDTH] IN BLOOD BY AUTOMATED COUNT: 11.7 % (ref 10–15)
HCT VFR BLD AUTO: 42.6 % (ref 40–53)
HGB BLD-MCNC: 14.2 G/DL (ref 13.3–17.7)
MCH RBC QN AUTO: 29.1 PG (ref 26.5–33)
MCHC RBC AUTO-ENTMCNC: 33.3 G/DL (ref 31.5–36.5)
MCV RBC AUTO: 87 FL (ref 78–100)
PLATELET # BLD AUTO: 242 10E3/UL (ref 150–450)
RBC # BLD AUTO: 4.88 10E6/UL (ref 4.4–5.9)
T4 FREE SERPL-MCNC: 1.5 NG/DL (ref 0.9–1.7)
TSH SERPL DL<=0.005 MIU/L-ACNC: 7.73 UIU/ML (ref 0.3–4.2)
WBC # BLD AUTO: 6.1 10E3/UL (ref 4–11)

## 2023-11-17 PROCEDURE — 86800 THYROGLOBULIN ANTIBODY: CPT | Mod: 90

## 2023-11-17 PROCEDURE — 36415 COLL VENOUS BLD VENIPUNCTURE: CPT

## 2023-11-17 PROCEDURE — 84439 ASSAY OF FREE THYROXINE: CPT

## 2023-11-17 PROCEDURE — 84443 ASSAY THYROID STIM HORMONE: CPT

## 2023-11-17 PROCEDURE — 99000 SPECIMEN HANDLING OFFICE-LAB: CPT

## 2023-11-17 PROCEDURE — 84432 ASSAY OF THYROGLOBULIN: CPT | Mod: 90

## 2023-11-17 PROCEDURE — 85027 COMPLETE CBC AUTOMATED: CPT

## 2023-11-17 NOTE — PROGRESS NOTES
Called patient to let her know we were unable to get an insurance estimate since their insurance is [employer] [place] of [place]. Will send over insurance questionnaire for them to call insurance to get quote. Writer's number will be provided via [employer] for questions or concerns. Maryjane Em RN on 11/17/2023 at 8:56 AM

## 2023-11-18 ENCOUNTER — TELEPHONE (OUTPATIENT)
Dept: ENDOCRINOLOGY | Facility: CLINIC | Age: 38
End: 2023-11-18
Payer: COMMERCIAL

## 2023-11-18 DIAGNOSIS — E89.0 POSTOPERATIVE HYPOTHYROIDISM: ICD-10-CM

## 2023-11-18 DIAGNOSIS — C73 PAPILLARY THYROID CARCINOMA (H): Primary | ICD-10-CM

## 2023-11-18 RX ORDER — LEVOTHYROXINE SODIUM 150 UG/1
150 TABLET ORAL DAILY
Qty: 90 TABLET | Refills: 4 | Status: SHIPPED | OUTPATIENT
Start: 2023-11-18

## 2023-11-18 NOTE — TELEPHONE ENCOUNTER
Spoke w/ Pt's wife. Confirms understanding of review and recommendation from Dr Galvez. No questions at this time.   Lis Mueller RN on 11/20/2023 at 8:28 AM         RE    Please call patient and his english speaking wife.  Read them the note I attached to the last labs from 11/17/23:   The TSH remains higher than we want it.  I recommend to increase levothyroxine to 150 mcg/day. I am submitting new rx to your pharmacy.   Thanks  Leslie Galvez MD

## 2023-11-27 LAB — SCANNED LAB RESULT: NORMAL

## 2023-11-27 NOTE — TELEPHONE ENCOUNTER
"Received a call from patient's spouse regarding concerns of patient's having a feeling of \"racing heart rate\". She indicates that this started after the initiation of the synthroid. Wife indicates that he only took half the dose of the synthroid and patient's symptoms improved.     Writer reviewed with Dr. Morales who advised that patient continue to take the prescribed dose of the synthroid (112 mcg) and continue to monitor. He advised patient follow-up with his PCP regarding the concerns.     Writer returned call and left message for spouse with information from Dr. Morales. Left direct line for return call to discuss or with additional concerns. Patient is also scheduled for follow-up on Monday with Dr. Morales.       Yue James, RN, BSN    "
Dtr Mandy is HCP/POA/supportive/involved/helpful

## 2023-11-28 ENCOUNTER — TELEPHONE (OUTPATIENT)
Dept: FAMILY MEDICINE | Facility: CLINIC | Age: 38
End: 2023-11-28
Payer: COMMERCIAL

## 2023-11-28 NOTE — TELEPHONE ENCOUNTER
Patient's spouse dropped off [employer] disability claim form for provider to complete--please fax to number on form when ready, [phone]. Requests tania  Thank you

## 2023-11-29 NOTE — TELEPHONE ENCOUNTER
Per review of chart I have already completed this form twice.  Please ask spouse what is needed in this form and what plan for return to work is.  Ideally this form should be completed by ENT/oncology or endocrine since they are treating him for this concern causing him to require short term disability.  Please notify patient and ask if she has presented this form recently to Endocrine/oncologist/ENT?    Please ask Patient what he is needing form to say based on plan with specialists. I cannot give great detail into his prognosis or progress since I have not seen him. I would prefer they have this form completed by Endocrine    Thank you  MANSOOR Urbina CNP on 11/28/2023 at 11:03 PM

## 2023-11-29 NOTE — TELEPHONE ENCOUNTER
Called patient with [facility] [place] [place] .     Pt states has a part time job and was asked to come back for 5 hours at a time.     He states that his PCP originally wrote him to be off work and now suggested to work only part time 5 hours at a time.     Patient is unable to see endocrinology until Jan 2024..     Patient has only seen ENT and oncology thus far, their plan is for patient to quarantine Jan 4-10, 2024 and will file again with [facility].     Lina are unsure why [facility] is asking for these forms to be filled out again at this time.     Writer had relayed Fabiola Kent's message.     Patient will call [facility] and try to figure out why they need forms filled out this time and clarify if PCP or specialty need to fill out forms.      No further questions at this time.

## 2023-11-30 NOTE — TELEPHONE ENCOUNTER
Form faxed this morning 11/30/2023 to fax number provided on the form.  Belia Boyd MA on 11/30/2023 at 9:05 AM

## 2024-01-02 ENCOUNTER — TELEPHONE (OUTPATIENT)
Dept: ENDOCRINOLOGY | Facility: CLINIC | Age: 39
End: 2024-01-02

## 2024-01-02 ENCOUNTER — ALLIED HEALTH/NURSE VISIT (OUTPATIENT)
Dept: ENDOCRINOLOGY | Facility: CLINIC | Age: 39
End: 2024-01-02
Payer: COMMERCIAL

## 2024-01-02 DIAGNOSIS — C73 PAPILLARY THYROID CARCINOMA (H): Primary | ICD-10-CM

## 2024-01-02 PROCEDURE — 99207 PR NO BILLABLE SERVICE THIS VISIT: CPT | Mod: 25

## 2024-01-02 PROCEDURE — 96372 THER/PROPH/DIAG INJ SC/IM: CPT

## 2024-01-02 NOTE — LETTER
[facility] [place] [facility] [address] [id number] [phone]      1/2/2024    Re: Leonid Mcmillan      TO WHOM IT MAY CONCERN:    Leonid Mcmillan  was seen on for medical therapy .  Please excuse him  until January 9th due to illness. Leonid Mcmillan can return to work on or after January 10th, 2024.    [facility]    Medicine Nurse

## 2024-01-02 NOTE — PROGRESS NOTES
Clinic Administered Medication Documentation      Injectable Medication Documentation    Is there an active order (written within the past 365 days, with administrations remaining, not ) in the chart? Yes.     Patient was given  THYROGEN . Prior to medication administration, verified patient's identity using patient s name and date of birth. Please see MAR and medication order for additional information. Patient instructed to remain in clinic for 15 minutes, report any adverse reaction to staff immediately, and remain in clinic for 15 minutes and report any adverse reaction to staff immediately but patient declined.    Vial/Syringe: Single dose vial. Was entire vial of medication used? Yes  Was this medication supplied by the patient? No  Is this a medication the patient will need to receive again? No - not necessary to check for refills remaining.

## 2024-01-02 NOTE — TELEPHONE ENCOUNTER
PA approved.   Lis Mueller RN on 1/2/2024 at 9:56 AM     RE    Sorry for cutting it kind of close with this one. Patient s insurance wasn t open yesterday and I ve been on hold all morning trying to reach them. Patient s medical benefits have been verified.         [facility] [place] - No Policy. No PA REQ. No Pre-D required. No restriction for site of services. Buy and Billable allowed. DX C73 is FDA approved.         [facility] [place]- No Policy. No SOC Policy. No PA REQ. DX C73 is FDA approved.         Order has been authorized, and patient is okay to proceed.         Thanks!              Jh Kinsey    Prior Authorization Specialist -  [facility] team notified thyrogen injections due starting today.   Lis Mueller RN on 1/2/2024 at 8:01 AM

## 2024-01-03 ENCOUNTER — ALLIED HEALTH/NURSE VISIT (OUTPATIENT)
Dept: ENDOCRINOLOGY | Facility: CLINIC | Age: 39
End: 2024-01-03
Payer: COMMERCIAL

## 2024-01-03 DIAGNOSIS — C73 PAPILLARY THYROID CARCINOMA (H): Primary | ICD-10-CM

## 2024-01-03 PROCEDURE — 99207 PR NO BILLABLE SERVICE THIS VISIT: CPT | Mod: 25

## 2024-01-03 PROCEDURE — 96372 THER/PROPH/DIAG INJ SC/IM: CPT

## 2024-01-04 ENCOUNTER — HOSPITAL ENCOUNTER (OUTPATIENT)
Dept: NUCLEAR MEDICINE | Facility: CLINIC | Age: 39
Setting detail: NUCLEAR MEDICINE
Discharge: HOME | End: 2024-01-04
Payer: COMMERCIAL

## 2024-01-04 ENCOUNTER — LAB (OUTPATIENT)
Dept: LAB | Facility: CLINIC | Age: 39
End: 2024-01-04
Payer: COMMERCIAL

## 2024-01-04 DIAGNOSIS — E89.0 POSTOPERATIVE HYPOTHYROIDISM: ICD-10-CM

## 2024-01-04 DIAGNOSIS — C73 PAPILLARY THYROID CARCINOMA (H): ICD-10-CM

## 2024-01-04 LAB
BASOPHILS # BLD AUTO: 0 10E3/UL (ref 0–0.2)
BASOPHILS NFR BLD AUTO: 0 %
EOSINOPHIL # BLD AUTO: 0.1 10E3/UL (ref 0–0.7)
EOSINOPHIL NFR BLD AUTO: 2 %
ERYTHROCYTE [DISTWIDTH] IN BLOOD BY AUTOMATED COUNT: 11.7 % (ref 10–15)
HCT VFR BLD AUTO: 47.6 % (ref 40–53)
HGB BLD-MCNC: 15.9 G/DL (ref 13.3–17.7)
IMM GRANULOCYTES # BLD: 0 10E3/UL
IMM GRANULOCYTES NFR BLD: 0 %
LYMPHOCYTES # BLD AUTO: 2.1 10E3/UL (ref 0.8–5.3)
LYMPHOCYTES NFR BLD AUTO: 39 %
MCH RBC QN AUTO: 28.4 PG (ref 26.5–33)
MCHC RBC AUTO-ENTMCNC: 33.4 G/DL (ref 31.5–36.5)
MCV RBC AUTO: 85 FL (ref 78–100)
MONOCYTES # BLD AUTO: 0.5 10E3/UL (ref 0–1.3)
MONOCYTES NFR BLD AUTO: 9 %
NEUTROPHILS # BLD AUTO: 2.7 10E3/UL (ref 1.6–8.3)
NEUTROPHILS NFR BLD AUTO: 50 %
NRBC # BLD AUTO: 0 10E3/UL
NRBC BLD AUTO-RTO: 0 /100
PLATELET # BLD AUTO: 271 10E3/UL (ref 150–450)
RBC # BLD AUTO: 5.59 10E6/UL (ref 4.4–5.9)
T4 FREE SERPL-MCNC: 2.2 NG/DL (ref 0.9–1.7)
TSH SERPL DL<=0.005 MIU/L-ACNC: 153.2 UIU/ML (ref 0.3–4.2)
WBC # BLD AUTO: 5.4 10E3/UL (ref 4–11)

## 2024-01-04 PROCEDURE — 86800 THYROGLOBULIN ANTIBODY: CPT

## 2024-01-04 PROCEDURE — 84439 ASSAY OF FREE THYROXINE: CPT | Performed by: PATHOLOGY

## 2024-01-04 PROCEDURE — 36415 COLL VENOUS BLD VENIPUNCTURE: CPT | Performed by: PATHOLOGY

## 2024-01-04 PROCEDURE — 79005 NUCLEAR RX ORAL ADMIN: CPT

## 2024-01-04 PROCEDURE — 85025 COMPLETE CBC W/AUTO DIFF WBC: CPT | Performed by: PATHOLOGY

## 2024-01-04 PROCEDURE — 84443 ASSAY THYROID STIM HORMONE: CPT | Performed by: PATHOLOGY

## 2024-01-04 PROCEDURE — 79005 NUCLEAR RX ORAL ADMIN: CPT | Mod: 26 | Performed by: RADIOLOGY

## 2024-01-09 ENCOUNTER — HOSPITAL ENCOUNTER (OUTPATIENT)
Dept: NUCLEAR MEDICINE | Facility: CLINIC | Age: 39
Setting detail: NUCLEAR MEDICINE
Discharge: HOME | End: 2024-01-09
Payer: COMMERCIAL

## 2024-01-09 DIAGNOSIS — C73 PAPILLARY THYROID CARCINOMA (H): ICD-10-CM

## 2024-01-09 DIAGNOSIS — E89.0 POSTOPERATIVE HYPOTHYROIDISM: ICD-10-CM

## 2024-01-09 LAB — SCANNED LAB RESULT: NORMAL

## 2024-01-09 PROCEDURE — C9898 INPNT STAY RADIOLABELED ITEM: HCPCS

## 2024-01-09 PROCEDURE — 78018 THYROID MET IMAGING BODY: CPT | Mod: 26 | Performed by: RADIOLOGY

## 2024-01-15 ENCOUNTER — PATIENT OUTREACH (OUTPATIENT)
Dept: OTOLARYNGOLOGY | Facility: CLINIC | Age: 39
End: 2024-01-15
Payer: COMMERCIAL

## 2024-01-15 NOTE — PROGRESS NOTES
Called patient wife to check in about cancelling procedure. Per patient wife they are working on appealing the last injection with Dr. Dale so they would like to postpone this visit. Writer will connect with alfred-op team to cancel and will help follow up scheduling once appeal is done. Patient wife was agreeable and verbalized understanding of the situation. Maryjane Em RN on 1/15/2024 at 3:28 PM

## 2024-01-16 ENCOUNTER — TELEPHONE (OUTPATIENT)
Dept: OTOLARYNGOLOGY | Facility: CLINIC | Age: 39
End: 2024-01-16
Payer: COMMERCIAL

## 2024-01-16 NOTE — TELEPHONE ENCOUNTER
Called patient wife per voicemail, care team will need t writer letter of support for appeal. Writer let provider know and will reach back out to family once letter is in MyChart. Patient wife was agreeable to this plan and verbalized understanding of the situation. Maryjane Em RN on 1/16/2024 at 2:33 PM

## 2024-05-28 ENCOUNTER — VIRTUAL VISIT (OUTPATIENT)
Dept: ENDOCRINOLOGY | Facility: CLINIC | Age: 39
End: 2024-05-28
Payer: COMMERCIAL

## 2024-05-28 VITALS — WEIGHT: 150 LBS | BODY MASS INDEX: 25.75 KG/M2

## 2024-05-28 DIAGNOSIS — C73 PAPILLARY THYROID CARCINOMA (H): Primary | ICD-10-CM

## 2024-05-28 DIAGNOSIS — E89.0 POSTOPERATIVE HYPOTHYROIDISM: ICD-10-CM

## 2024-05-28 PROCEDURE — 99214 OFFICE O/P EST MOD 30 MIN: CPT | Mod: 95

## 2024-05-28 PROCEDURE — G2211 COMPLEX E/M VISIT ADD ON: HCPCS | Mod: 95

## 2024-05-28 NOTE — PROGRESS NOTES
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  Treatment has been surgery, adjuvant 131I.   pT4a, pN1a, cMx  MACIS 5.73 assuming complete resection and no distant mets .  DONTAE recurrent risk high   Labs now and every 6 months for the next year  RTC 1 year with labs 2-3 weeks prior: Tg, TSH, free T4   Neck US August 2024     Post surgical hypothyroidism.    Treat to target TSH < 0.2 .  Unstable.  On LT4  150 mcg/day.  Labs now    Addendum  5/29/24 Tg < 0.1, DONTAE < 0.4, TSH 0.28, free t4 2.29  6/13/24 chart check - US for 8/24 is not yet scheduled .  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  plan as above. If the labs are normal he will need further evaluation to determine what this is.      The Longitudinal plan of care for postsurgical hypothyroidism related to thyroid cancer/thyroid cancer surveillance was addressed during this visit. Due to added complexity of care, we will continue to support Leonid , and the subsequent management of this condition(s) and with the ongoing continuity of care of this condition.    Due to the continued risks of COVID 19 transmission and to improve ease of access this visit was a video visit. The patient gave verbal consent for the visit today.    I have independently reviewed and interpreted labs, imaging as indicated.     Distant Location (provider location):  Off-site  Mode of Communication:  Video Conference via [employer]  Chart review/prep time 1 7570-9449  ; 0855   Visit Start time 1503   Visit Stop time  1511   _23_ minutes spent on the date of the encounter doing chart review, history and exam, documentation and further activities as  noted above.  .  Leslie Galvez MD    Chief complaint:  HISTORY OF PRESENT ILLNESS  Leonid presents along with his wife, who served as .    He carries diagnosis of  BRAF V600E mutant papillary thyroid carcinoma grossly invading tracheal cartilage, post surgical hypothyroidism. I last saw him 11/6/23.  At our last visit he was on LT4 112 x 8/week (128 mcg/day) and we subsequently increased to 150 mcg/day.     Since then, we also  treated with radioactive iodine    He first became aware of right thyroid mass in 2014, before he left [place].    Right thyroid mass was seem on Thyroid US in 2017.  Recommendation for FNAB was made in 2017 and 2021   He presented to the ED with hemoptysis 3/3/2023.   CT neck and chest  showed the right mass. 6/26 fiberoptic laryngoscopy by Dr Morales showed tumor emanating into the airway.  FNAB of the right thyroid mass was positive for papillary thyroid carcinoma.   Treatment of the thyroid cancer has been as follows:   8/24/23 total thyroidectomy, tracheal resection (3 rings removed), sacrifice of the RLN, reimplanation of right inferior parathyroid gland.  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    10/26/23 Voice gel injection (Dr Dale)  1/4/23 131I 100.4 mCi    Endocrine relevant labs are as follows:  4/6/17 TSH 0.89  3/3/23 TSH 1.07, Ca 8.7  8/24/23 PTH 31, Ca 8.5,   8/27/23 Ca 8.2, glucose 104   9/27/23 Tg 1, DONTAE < 0.4, urine iodine 58 mcg/L TSH 11.87, free T4 1.39, Ca 9.4, phos 4.3,  11/17/23 Tg 0.3, DONTAE < 0.4, TSH 7.73, free t4 1.5-- increase to 150 mcg/day.    1/4/2024 Thyrogen stimulated Tg 0.56, DONTAE < 0.4, .2, free T4 2.2    Relevant imaging is as follows:  (as read by me as it pertains to endocrine relevant organs)  3/3/2023 CT neck and chest: large right thyorid mass with tracheal deviation to the left ; some tracheal compression   5/19/2023 thyroid US copmared with 4/6/17   Right thyroid mass (5.9 x 4.2 x  expands right lobe - anterior borders suggestion suggestive of ETE ; heterogeneous; few echogenic foci; was 3.4 x 3.5 x 5.8 cm -- FNAB 5/25/23   Right inf # 1 1.4 x 1.1 x 1.6 cm - ? Extrathyroidal ; was   1.3 x 1 x 1.3   Left lobe smallish   6/6/23 neck US: no abnormal LNs  6/6/23 CT chest and neck with contrast - right thyroid mass very heerogeneous - extends posteriorly to vertebral body; tracheal deviation and some compression; irregular right tracheal border ? Shows site of invasion?   1/4/23 131I 100.4 mCi  1/9/23 post therapy TBS  thyroid bed uptake only, left of center       Childhod radiation exposure: none ; no medical xray     REVIEW OF SYSTEMS  Weight up and down - -current weight approx 150   More tired  Cardiac: negative   Respiratory: More breathless-- if walking, running, lifting heavy . No symptoms at night though wife notes he snores a lot and this is new .. She thinks he looks like he is struggling to breathe when sleeping; one pillow   Throat closing with spicy food   No choking on water   GI: negative   No temperature intolerance  No swelling  No dizziness  No pain    Past Medical History  Past Medical History:   Diagnosis Date    Infection due to 2019 novel coronavirus 02/2023    Papillary thyroid carcinoma (H) 08/24/2023    Postoperative hypothyroidism 08/24/2023    Thyroid mass 2014     Past Surgical History:   Procedure Laterality Date    BRONCHOSCOPY FLEXIBLE AND RIGID N/A 7/13/2023    Procedure: Flexible bronchoscopy, direct laryngoscopy;  Surgeon: Antonio Morales MD;  Location: [facility]    ESOPHAGOSCOPY N/A 7/13/2023    Procedure: ESOPHAGOSCOPY;  Surgeon: Antonio Morales MD;  [facility]    ESOPHAGOSCOPY N/A 8/24/2023     Procedure: Esophagoscopy;  Surgeon: Antonio Morales MD;  [facility]    LARYNGOSCOPY, BRONCHOSCOPY, COMBINED N/A 8/24/2023    Procedure: direct laryngoscopy, flexible Bronchoscopy;  Surgeon: Antonio Morales MD;  [facility]    RESECT TRACHEA WITH RECONSTRUCTION N/A 8/24/2023    Procedure: tracheal resection;  Surgeon: Pieter Ballard MD;  Location: [facility]    SHOULDER SURGERY Right 2005    shoulder injury, did not have general anesthesia    THYROIDECTOMY N/A 8/24/2023    Procedure: total thyroidectomy;  Surgeon: Antonio Morales MD;  Location: [facility]     Medications  Current Outpatient Medications   Medication Sig Dispense Refill    levothyroxine (SYNTHROID/LEVOTHROID) 150 MCG tablet Take 1 tablet (150 mcg) by mouth daily 90 tablet 4     He denies noncompliance.     Allergies  No Known Allergies    Family History  Family History   Problem Relation Age of Onset    Diabetes Father     Anesthesia Reaction No family hx of     Venous thrombosis No family hx of     Myocardial Infarction No family hx of     Cerebrovascular Disease No family hx of     Thyroid Cancer No family hx of     Goiter No family hx of     Cancer No family hx of      Social History  Social History     Tobacco Use    Smoking status: Never     Passive exposure: Never    Smokeless tobacco: Never   Vaping Use    Vaping status: Never Used   Substance Use Topics    Alcohol use: Never    Drug use: Never     Came from [place] 2014    daughter; ;      Physical Exam  GENERAL: no distress; I can see from mid chest up ; His wife is partially on camera on his right. HIs voice sounds normal.   BP Readings from Last 1 Encounters:   11/16/23 134/87      Pulse Readings from Last 1 Encounters:   11/16/23 65      Resp Readings from Last 1 Encounters:   10/18/23 18      Temp Readings from Last 1 Encounters:   10/18/23 98.2  F (36.8  C) (Oral)      SpO2 Readings from Last 1 Encounters:   10/18/23 98%      Wt Readings from Last 1 Encounters:  "  05/28/24 68 kg (150 lb)      Ht Readings from Last 1 Encounters:   10/18/23 1.626 m (5' 4\")     SKIN: Visible skin clear. No significant rash, abnormal pigmentation or lesions.  EYES: Eyes grossly normal to inspection.    NECK: no visible masses; no grossly visible goiter  RESP: No audible wheeze, cough, or increased work of breathing.    NEURO:  Awake, alert, responds appropriately to questions.  Mentation and speech fluent.  PSYCH:affect normal,and appearance well-groomed.    DATA REVIEW      Examination: Nuclear medicine thyroid scan and SPECT-CT of the head  and neck, on   1/9/2024 11:29 AM  .  Indication:  Papillary thyroid carcinoma (H); Postoperative hypothyroidism .  Additional Information: 38-year-old man with papillary thyroid carcinoma involving the right thyroid lobe status post surgical  resection presents for adjuvant therapy with radioactive iodine 131.  Technique:  The patient received 100.4 m -Ci of I-131orally.  Scan was obtained 5 days after administration.  Findings:  There is intense radiotracer uptake within the left thyroid gland on planar images. No other area of abnormal uptake is seen on whole body planar imaging. No definite evidence of distant metastases.  SPECT-CT demonstrates localized radiotracer uptake in the residual left thyroid gland. No abnormal uptake within the cervical lymph nodes or any other region within the field-of-view.  Postoperative changes of right thyroid carcinoma resection.  Visualized lung apices appear unremarkable.  Mild mucosal thickening right maxillary sinus.                                                             Impression:  Radiotracer uptake within the residual left thyroid gland without any definite evidence of residual disease or distant metastases.  I have personally reviewed the examination and initial interpretation and I agree with the findings.  SUZIE DEY MD     Narrative & Impression   EXAMINATION: US HEAD NECK SOFT TISSUE, " 8/27/2024 10:35 AM   COMPARISON: Head and neck ultrasound 6/6/2023  HISTORY: 39-year-old man, papillary thyroid carcinoma status post  thyroidectomy.  FINDINGS:  Lymph nodes are measured bilaterally with measurements given in  craniocaudal, transverse and AP dimensions as follows:  Right:  Level 2:   (#1) 2.6 x 0.8 x 1.9 cm reniform node with normal echogenic fatty  hilum.  (#2) 1.6  x 0.5 x 0.8 cm reniform node with normal echogenic fatty  hilum  Level 3: None  Level 4: None  Level 5: None  Level 6: None  Level 7: None  Left:  Level 2:   (#1) 1.3 x 1.9 x 0.7 cm reniform node with normal echogenic fatty  hilum.  (#2) 1.0 x 1.0 x 0.6 cm reniform node with normal echogenic fatty  hilum.  Level 3: None  Level 4: None  Level 5: None  Level 6: None  Level 7: None                                                         IMPRESSION:  1.  Soft tissue neck ultrasound with lymph node measurements as  described above.  2.  No suspicious lymphadenopathy.  I have personally reviewed the examination and initial interpretation  and I agree with the findings.  ALLIE PULIDO MD

## 2024-05-28 NOTE — LETTER
5/28/2024       RE: Leonid Mcmillan  [address] [place] [address]     Dear Colleague,    Thank you for referring your patient, Leonid Mcmillan, to the [facility] [place] at [facility]. Please see a copy of my visit note below.    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  Treatment has been surgery, adjuvant 131I.   pT4a, pN1a, cMx  MACIS 5.73 assuming complete resection and no distant mets .  DONTAE recurrent risk high   Labs now and every 6 months for the next year  RTC 1 year with labs 2-3 weeks prior: Tg, TSH, free T4   Neck US August 2024     Post surgical hypothyroidism.    Treat to target TSH < 0.2 .  Unstable.  On LT4  150 mcg/day.  Labs now    TURNER - new .  plan as above. If the labs are normal he will need further evaluation to determine what this is.      The Longitudinal plan of care for postsurgical hypothyroidism related to thyroid cancer/thyroid cancer surveillance was addressed during this visit. Due to added complexity of care, we will continue to support Leonid , and the subsequent management of this condition(s) and with the ongoing continuity of care of this condition.    Due to the continued risks of COVID 19 transmission and to improve ease of access this visit was a video visit. The patient gave verbal consent for the visit today.    I have independently reviewed and interpreted labs, imaging as indicated.     Distant Location (provider location):  Off-site  Mode of Communication:  Video Conference via [employer]  Chart review/prep time 1 2519-4578  ; 0855   Visit Start time 1503   Visit Stop time  1511   _23_ minutes spent on the date of the encounter doing  chart review, history and exam, documentation and further activities as noted above.  .  Leslie Galvez MD    Chief complaint:  HISTORY OF PRESENT ILLNESS  Leonid presents along with his wife, who served as .    He carries diagnosis of  BRAF V600E mutant papillary thyroid carcinoma grossly invading tracheal cartilage, post surgical hypothyroidism. I last saw him 11/6/23.  At our last visit he was on LT4 112 x 8/week (128 mcg/day) and we subsequently increased to 150 mcg/day.     Since then, we also  treated with radioactive iodine    He first became aware of right thyroid mass in 2014, before he left [place].    Right thyroid mass was seem on Thyroid US in 2017.  Recommendation for FNAB was made in 2017 and 2021   He presented to the ED with hemoptysis 3/3/2023.   CT neck and chest  showed the right mass. 6/26 fiberoptic laryngoscopy by Dr Morales showed tumor emanating into the airway.  FNAB of the right thyroid mass was positive for papillary thyroid carcinoma.   Treatment of the thyroid cancer has been as follows:   8/24/23 total thyroidectomy, tracheal resection (3 rings removed), sacrifice of the RLN, reimplanation of right inferior parathyroid gland.  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    10/26/23 Voice gel injection (Dr Dale)  1/4/23 131I 100.4 mCi    Endocrine relevant labs are as follows:  4/6/17 TSH 0.89  3/3/23 TSH 1.07, Ca 8.7  8/24/23 PTH 31, Ca 8.5,   8/27/23 Ca 8.2, glucose 104   9/27/23 Tg 1, DONTAE < 0.4, urine iodine 58 mcg/L TSH 11.87, free T4 1.39, Ca 9.4, phos 4.3,  11/17/23 Tg 0.3, DONTAE < 0.4, TSH 7.73, free t4 1.5-- increase to 150 mcg/day.    1/4/2024 Thyrogen stimulated Tg 0.56,  DONTAE < 0.4, .2, free T4 2.2    Relevant imaging is as follows: (as read by me as it pertains to endocrine relevant organs)  3/3/2023 CT neck and chest: large right thyorid mass with tracheal deviation to the left ; some tracheal compression   5/19/2023 thyroid US copmared with 4/6/17   Right thyroid mass (5.9 x 4.2 x  expands right lobe - anterior borders suggestion suggestive of ETE ; heterogeneous; few echogenic foci; was 3.4 x 3.5 x 5.8 cm -- FNAB 5/25/23   Right inf # 1 1.4 x 1.1 x 1.6 cm - ? Extrathyroidal ; was   1.3 x 1 x 1.3   Left lobe smallish   6/6/23 neck US: no abnormal LNs  6/6/23 CT chest and neck with contrast - right thyroid mass very heerogeneous - extends posteriorly to vertebral body; tracheal deviation and some compression; irregular right tracheal border ? Shows site of invasion?   1/4/23 131I 100.4 mCi  1/9/23 post therapy TBS  thyroid bed uptake only, left of center       Childhod radiation exposure: none ; no medical xray     REVIEW OF SYSTEMS  Weight up and down - -current weight approx 150   More tired  Cardiac: negative   Respiratory: More breathless-- if walking, running, lifting heavy . No symptoms at night though wife notes he snores a lot and this is new .. She thinks he looks like he is struggling to breathe when sleeping; one pillow   Throat closing with spicy food   No choking on water   GI: negative   No temperature intolerance  No swelling  No dizziness  No pain    Past Medical History  Past Medical History:   Diagnosis Date    Infection due to 2019 novel coronavirus 02/2023    Papillary thyroid carcinoma (H) 08/24/2023    Postoperative hypothyroidism 08/24/2023    Thyroid mass 2014     Past Surgical History:   Procedure Laterality Date    BRONCHOSCOPY FLEXIBLE AND RIGID N/A 7/13/2023    Procedure: Flexible bronchoscopy, direct laryngoscopy;  Surgeon: Antonio Morales MD;  Location[facility]    ESOPHAGOSCOPY N/A 7/13/2023    Procedure: ESOPHAGOSCOPY;  Surgeon: Andrew  Antonio Moya MD;  [facility]    ESOPHAGOSCOPY N/A 8/24/2023    Procedure: Esophagoscopy;  Surgeon: Antonio Morales MD;  [facility]    LARYNGOSCOPY, BRONCHOSCOPY, COMBINED N/A 8/24/2023    Procedure: direct laryngoscopy, flexible Bronchoscopy;  Surgeon: Antonio Morales MD;  [facility]    RESECT TRACHEA WITH RECONSTRUCTION N/A 8/24/2023    Procedure: tracheal resection;  Surgeon: Pieter Ballard MD;  [facility]    SHOULDER SURGERY Right 2005    shoulder injury, did not have general anesthesia    THYROIDECTOMY N/A 8/24/2023    Procedure: total thyroidectomy;  Surgeon: Antonio Morales MD;  [facility]     Medications  Current Outpatient Medications   Medication Sig Dispense Refill    levothyroxine (SYNTHROID/LEVOTHROID) 150 MCG tablet Take 1 tablet (150 mcg) by mouth daily 90 tablet 4     He denies noncompliance.     Allergies  No Known Allergies    Family History  Family History   Problem Relation Age of Onset    Diabetes Father     Anesthesia Reaction No family hx of     Venous thrombosis No family hx of     Myocardial Infarction No family hx of     Cerebrovascular Disease No family hx of     Thyroid Cancer No family hx of     Goiter No family hx of     Cancer No family hx of      Social History  Social History     Tobacco Use    Smoking status: Never     Passive exposure: Never    Smokeless tobacco: Never   Vaping Use    Vaping status: Never Used   Substance Use Topics    Alcohol use: Never    Drug use: Never     Came from [place] 2014    daughter; ;      Physical Exam  GENERAL: no distress; I can see from mid chest up ; His wife is partially on camera on his right. HIs voice sounds normal.   BP Readings from Last 1 Encounters:   11/16/23 134/87      Pulse Readings from Last 1 Encounters:   11/16/23 65      Resp Readings from Last 1 Encounters:   10/18/23 18      Temp Readings from Last 1 Encounters:   10/18/23 98.2  F (36.8  C) (Oral)      SpO2 Readings from Last 1  "Encounters:   10/18/23 98%      Wt Readings from Last 1 Encounters:   05/28/24 68 kg (150 lb)      Ht Readings from Last 1 Encounters:   10/18/23 1.626 m (5' 4\")     SKIN: Visible skin clear. No significant rash, abnormal pigmentation or lesions.  EYES: Eyes grossly normal to inspection.    NECK: no visible masses; no grossly visible goiter  RESP: No audible wheeze, cough, or increased work of breathing.    NEURO:  Awake, alert, responds appropriately to questions.  Mentation and speech fluent.  PSYCH:affect normal,and appearance well-groomed.    DATA REVIEW      Examination: Nuclear medicine thyroid scan and SPECT-CT of the head  and neck, on   1/9/2024 11:29 AM  .  Indication:  Papillary thyroid carcinoma (H); Postoperative hypothyroidism .  Additional Information: 38-year-old man with papillary thyroid carcinoma involving the right thyroid lobe status post surgical  resection presents for adjuvant therapy with radioactive iodine 131.  Technique:  The patient received 100.4 m -Ci of I-131orally.  Scan was obtained 5 days after administration.  Findings:  There is intense radiotracer uptake within the left thyroid gland on planar images. No other area of abnormal uptake is seen on whole body planar imaging. No definite evidence of distant metastases.  SPECT-CT demonstrates localized radiotracer uptake in the residual left thyroid gland. No abnormal uptake within the cervical lymph nodes or any other region within the field-of-view.  Postoperative changes of right thyroid carcinoma resection.  Visualized lung apices appear unremarkable.  Mild mucosal thickening right maxillary sinus.                                                             Impression:  Radiotracer uptake within the residual left thyroid gland without any definite evidence of residual disease or distant metastases.  I have personally reviewed the examination and initial interpretation and I agree with the findings.  SUZIE DEY MD     "

## 2024-05-28 NOTE — PATIENT INSTRUCTIONS
Labs now and in 6 months and 2-3 weeks before next appt.  I will send result on Sxbbm.    Neck US August 2024 ([id number] to schedule)

## 2024-05-29 ENCOUNTER — LAB (OUTPATIENT)
Dept: LAB | Facility: CLINIC | Age: 39
End: 2024-05-29
Payer: COMMERCIAL

## 2024-05-29 DIAGNOSIS — E89.0 POSTOPERATIVE HYPOTHYROIDISM: ICD-10-CM

## 2024-05-29 DIAGNOSIS — C73 PAPILLARY THYROID CARCINOMA (H): ICD-10-CM

## 2024-05-29 LAB
T4 FREE SERPL-MCNC: 2.29 NG/DL (ref 0.9–1.7)
TSH SERPL DL<=0.005 MIU/L-ACNC: 0.28 UIU/ML (ref 0.3–4.2)

## 2024-05-29 PROCEDURE — 84439 ASSAY OF FREE THYROXINE: CPT

## 2024-05-29 PROCEDURE — 84443 ASSAY THYROID STIM HORMONE: CPT

## 2024-05-29 PROCEDURE — 99000 SPECIMEN HANDLING OFFICE-LAB: CPT

## 2024-05-29 PROCEDURE — 86800 THYROGLOBULIN ANTIBODY: CPT | Mod: 90

## 2024-05-29 PROCEDURE — 84432 ASSAY OF THYROGLOBULIN: CPT | Mod: 90

## 2024-05-29 PROCEDURE — 36415 COLL VENOUS BLD VENIPUNCTURE: CPT

## 2024-06-03 DIAGNOSIS — E89.0 POSTOPERATIVE HYPOTHYROIDISM: ICD-10-CM

## 2024-06-03 DIAGNOSIS — C73 PAPILLARY THYROID CARCINOMA (H): Primary | ICD-10-CM

## 2024-06-03 DIAGNOSIS — J38.01 VOCAL FOLD PARALYSIS, RIGHT: ICD-10-CM

## 2024-06-03 LAB — SCANNED LAB RESULT: NORMAL

## 2024-06-14 ENCOUNTER — TELEPHONE (OUTPATIENT)
Dept: ENDOCRINOLOGY | Facility: CLINIC | Age: 39
End: 2024-06-14
Payer: COMMERCIAL

## 2024-06-14 NOTE — TELEPHONE ENCOUNTER
Concha herring wanting to give you their number for coordination of care.          Note     Concha CHAVEZ RN Case manager [phone]

## 2024-06-21 ENCOUNTER — TELEPHONE (OUTPATIENT)
Dept: ENDOCRINOLOGY | Facility: CLINIC | Age: 39
End: 2024-06-21
Payer: COMMERCIAL

## 2024-06-21 NOTE — TELEPHONE ENCOUNTER
Left Voicemail (1st Attempt) and Sent Mychart (1st Attempt) for the patient to call back and schedule the following:    Appointment type: Return Thyroid Cancer  Provider: Dr. Galvez  Return date: 1 year (around 5/28/25)  RTN POONAM MENENDEZ per provider  Specialty phone number: [phone]  Additional appointment(s) needed:   -labs in 6 mo (around 11/28/24)  -labs 2-3 weeks prior to next appointment in 1 year  -CT scan  -Neck US in August 2024  Additonal Notes: LVM/MyC x1 - per permanent comments contact spouse for scheduling and they do no require     Check Out Comments: Labs now and in 6 months and 2-3 weeks before next appt Neck US August 2024 OK to use return POONAM

## 2024-06-24 NOTE — TELEPHONE ENCOUNTER
Left Voicemail (2nd Attempt) and Sent Mychart (2nd Attempt) for the patient to call back and schedule the following:    Appointment type: Return Thyroid Cancer  Provider: Dr. Galvez  Return date: 1 year (around 5/28/25)  RTN POONAM MENENDEZ per provider  Specialty phone number: [phone]  Additional appointment(s) needed:   -labs in 6 mo (around 11/28/24)  -labs 2-3 weeks prior to next appointment in 1 year  -CT scan  -Neck US in August 2024  Additonal Notes: LVMx2/MyC x2 - per permanent comments contact spouse for scheduling and they do no require      Check Out Comments: Labs now and in 6 months and 2-3 weeks before next appt Neck US August 2024 OK to use return POONAM

## 2024-07-21 ENCOUNTER — HEALTH MAINTENANCE LETTER (OUTPATIENT)
Age: 39
End: 2024-07-21

## 2024-08-27 ENCOUNTER — ANCILLARY PROCEDURE (OUTPATIENT)
Dept: CT IMAGING | Facility: CLINIC | Age: 39
End: 2024-08-27
Payer: COMMERCIAL

## 2024-08-27 ENCOUNTER — ANCILLARY PROCEDURE (OUTPATIENT)
Dept: ULTRASOUND IMAGING | Facility: CLINIC | Age: 39
End: 2024-08-27
Payer: COMMERCIAL

## 2024-08-27 DIAGNOSIS — E89.0 POSTOPERATIVE HYPOTHYROIDISM: ICD-10-CM

## 2024-08-27 DIAGNOSIS — C73 PAPILLARY THYROID CARCINOMA (H): ICD-10-CM

## 2024-08-27 DIAGNOSIS — J38.01 VOCAL FOLD PARALYSIS, RIGHT: ICD-10-CM

## 2024-08-27 PROCEDURE — 71250 CT THORAX DX C-: CPT | Mod: GC | Performed by: RADIOLOGY

## 2024-08-27 PROCEDURE — 76536 US EXAM OF HEAD AND NECK: CPT | Mod: GC | Performed by: RADIOLOGY

## 2024-08-30 NOTE — PROGRESS NOTES
[facility]   at the [facility]   Otolaryngology Clinic     Patient: Leonid Mcmillan    MRN: [id number]    : 1985    Age/Gender: 39 year old male  Date of Service: 9/3/2024  Rendering Provider:   Tessy Dale MD         Impression & Plan     IMPRESSION: Mr. Mcmillan is a 39 year old male who is being seen for the following:    Dysphonia  - ongoing for 2 months  - stable  - in the setting of history of total thyroidectomy with tracheal resection on 23  - speaking voice is affected   - singing voice is affected  - no pain with voice use  - voice demand is low  - scope evaluation shows right vocal fold paralysis with glottal gap  - FEES shows penetration with thins   - discussed etiology of vocal fold paralysis in this case is due to right vocal fold paralysis and CTR  - discussed prognosis of vocal fold paralysis is permanent   - discussed options of treatment which include observation, voice therapy, temporary augmentation vs long term options  - discussed long term options of vocal fold augmentation with injection laryngoplasty with CAHA vs lipoinjection, framework surgery and reinnervation  - given he is so recent from his CTR recommend voice gel injection laryngoplasty to see if voice is improved and if this helps then will need a longer term filler   - a permanent option will be offered next summer once he is 1 year from his CTR  - discussed his pitch will always be low   - right voice gel laryngoplasty was performed 10/26/23 without complication  - symptoms 2023 are improved, feels his voice is back to normal  - scope shows right vocal fold paralysis, good glottal closure  - FEES shows no penetration anymore with liquids  - discussed if swallowing continues to get worse over the next month, will consider a long term filler lasting 6-9 months vs repeat voice gel laryngoplasty lasting 8-12 weeks vs CAHA lasting 12 months   - symptoms 2024 are worsened breathing, especially with  activity like mowing the lawn, him and his wife report having trouble breathing in and inspiratory stridor  - scope shows right vocal fold paralysis and NEW left vocal fold paresis  - discussed further imaging to identify the reason for his new paresis ( he did have recent neck US and CT chest that were non diagnostic) - reached out to head and neck and endocrinology  - also dicussed optimizing breathing with breathing therapy as well  Plan  - breathing therapy  - reached out to head and neck and endocrinology (re: MRI)    RETURN VISIT: after therapy     Chief Complaint   Right vocal cord paralysis  Dysphonia  S/p voice gel laryngoplasty 10/26/23  Interval History   HISTORY OF PRESENT ILLNESS: Mr. Mcmillan is a 39 year old male is being followed for dysphonia . He was initially seen on 10/26/23. Please refer to this note for full history.   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 He will be undergoing adjuvant radiation therapy.      Today, he presents for follow up. he reports:  - more breathing issues  - here with his wife     PAST MEDICAL HISTORY:   Past Medical History:   Diagnosis Date    Infection due to 2019 novel coronavirus 02/2023    Papillary thyroid carcinoma (H) 08/24/2023    Postoperative hypothyroidism 08/24/2023    Thyroid mass 2014       PAST SURGICAL HISTORY:   Past Surgical History:   Procedure Laterality Date    BRONCHOSCOPY FLEXIBLE AND RIGID N/A 7/13/2023    Procedure: Flexible bronchoscopy, direct laryngoscopy;  Surgeon: Antonio Morales MD;  Location: [facility] OR    ESOPHAGOSCOPY N/A 7/13/2023    Procedure: ESOPHAGOSCOPY;  Surgeon: Antonio Morales MD;  Location: [facility] OR    ESOPHAGOSCOPY N/A 8/24/2023    Procedure: Esophagoscopy;  Surgeon: Antonio Morales MD;  Location: [facility] OR    LARYNGOSCOPY, BRONCHOSCOPY, COMBINED N/A 8/24/2023     Procedure: direct laryngoscopy, flexible Bronchoscopy;  Surgeon: Antonio Morales MD;  Location: [facility] OR    RESECT TRACHEA WITH RECONSTRUCTION N/A 8/24/2023    Procedure: tracheal resection;  Surgeon: Pieter Ballard MD;  Location: [facility] OR    SHOULDER SURGERY Right 2005    shoulder injury, did not have general anesthesia    THYROIDECTOMY N/A 8/24/2023    Procedure: total thyroidectomy;  Surgeon: Antonio Morales MD;  Location: [facility] OR       CURRENT MEDICATIONS:   Current Outpatient Medications:     levothyroxine (SYNTHROID/LEVOTHROID) 150 MCG tablet, Take 1 tablet (150 mcg) by mouth daily, Disp: 90 tablet, Rfl: 4    ALLERGIES: Patient has no known allergies.    SOCIAL HISTORY:    Social History     Socioeconomic History    Marital status:      Spouse name: Not on file    Number of children: Not on file    Years of education: Not on file    Highest education level: Not on file   Occupational History    Not on file   Tobacco Use    Smoking status: Never     Passive exposure: Never    Smokeless tobacco: Never   Vaping Use    Vaping status: Never Used   Substance and Sexual Activity    Alcohol use: Never    Drug use: Never    Sexual activity: Not on file   Other Topics Concern    Not on file   Social History Narrative    Moved here in 2014     Social Determinants of Health     Financial Resource Strain: Low Risk  (10/18/2023)    Financial Resource Strain     Within the past 12 months, have you or your family members you live with been unable to get utilities (heat, electricity) when it was really needed?: No   Food Insecurity: Low Risk  (10/18/2023)    Food Insecurity     Within the past 12 months, did you worry that your food would run out before you got money to buy more?: No     Within the past 12 months, did the food you bought just not last and you didn t have money to get more?: No   Transportation Needs: Low Risk  (10/18/2023)    Transportation Needs     Within the past 12 months, has lack of  transportation kept you from medical appointments, getting your medicines, non-medical meetings or appointments, work, or from getting things that you need?: No   Physical Activity: Not on file   Stress: Not on file   Social Connections: Not on file   Interpersonal Safety: Low Risk  (10/18/2023)    Interpersonal Safety     Do you feel physically and emotionally safe where you currently live?: Yes     Within the past 12 months, have you been hit, slapped, kicked or otherwise physically hurt by someone?: No     Within the past 12 months, have you been humiliated or emotionally abused in other ways by your partner or ex-partner?: No   Housing Stability: Low Risk  (10/18/2023)    Housing Stability     Do you have housing? : Yes     Are you worried about losing your housing?: No         FAMILY HISTORY:   Family History   Problem Relation Age of Onset    Diabetes Father     Anesthesia Reaction No family hx of     Venous thrombosis No family hx of     Myocardial Infarction No family hx of     Cerebrovascular Disease No family hx of     Thyroid Cancer No family hx of     Goiter No family hx of     Cancer No family hx of       Non-contributory for problems with anesthesia    REVIEW OF SYSTEMS:   The patient was asked a 14 point review of systems regarding constitutional symptoms, eye symptoms, ears, nose, mouth, throat symptoms, cardiovascular symptoms, respiratory symptoms, gastrointestinal symptoms, genitourinary symptoms, musculoskeletal symptoms, integumentary symptoms, neurological symptoms, psychiatric symptoms, endocrine symptoms, hematologic/lymphatic symptoms, and allergic/ immunologic symptoms.   The pertinent factors have been included in the HPI and below.  Patient Supplied Answers to Review of Systems      11/16/2023     8:47 AM   [facility] ROS   Cardiopulmonary Cough   Gastrointestinal/Genitourinary Heartburn/indigestion   Endocrine Heat or cold intolerance       Physical Examination   The patient underwent a  physical examination as described below. The pertinent positive and negative findings are summarized after the description of the examination.  Constitutional: The patient's developmental and nutritional status was assessed. The patient's voice quality was assessed.  Head and Face: The head and face were inspected for deformities. The sinuses were palpated. The salivary glands were palpated. Facial muscle strength was assessed bilaterally.  Eyes: Extraocular movements and primary gaze alignment were assessed.  Ears, Nose, Mouth and Throat: The ears and nose were examined for deformities. The nasal septum, mucosa, and turbinates were inspected by anterior rhinoscopy. The lips, teeth, and gums were examined for abnormalities. The oral mucosa, tongue, palate, tonsils, lateral and posterior pharynx were inspected for the presence of asymmetry or mucosal lesions.    Neck: The tracheal position was noted, and the neck mass palpated to determine if there were any asymmetries, abnormal neck masses, thyromegally, or thyroid nodules.  Respiratory: The nature of the breathing and chest expansion/symmetry was observed.  Cardiovascular: The patient was examined to determine the presence of any edema or jugular venous distension.  Abdomen: The contour of the abdomen was noted.  Lymphatic: The patient was examined for infraclavicular lymphadenopathy.  Musculoskeletal: The patient was inspected for the presence of skeletal deformities.  Extremities: The extremities were examined for any clubbing or cyanosis.  Skin: The skin was examined for inflammatory or neoplastic conditions.  Neurologic: The patient's orientation, mood, and affect were noted. The cranial nerve  functions were examined.  Other pertinent positive and negative findings on physical examination:   OC/OP: no lesions, uvula midline, soft palate elevates symmetrically   Neck: no lesions, no TH tenderness to palpation  Healed incisions  All other physical examination  findings were within normal limits and noncontributory.    Procedures   Flexible laryngoscopy (CPT 83127)        Pre-procedure diagnosis: dysphonia  Post-procedure diagnosis: same as above  Indication for procedure: Mr. Mcmillan is a 38 year old male with see above  Procedure(s): Fiberoptic Laryngoscopy     Details of Procedure: After informed consent was obtained, the patient was seated in the examination chair.  The areas of the nasopharynx as well as the hypopharynx were anesthetized with topical 4% lidocaine with 0.25% phenylephrine atomizer.  Examination of the base of tongue was performed first.  Attention was directed to any evidence of masses in the area or evidence of leukoplakia or candidal infection.  Attention was directed to the epiglottis where its size and position was determined and its movement on phonation of the vowel  e .  The piriform sinuses were then inspected for any mass lesions or pooling of secretions.  Attention was then directed to the larynx. The vocal folds were inspected for infection or any areas of leukoplakia, for masses, polypoid degeneration, or hemorrhage.  Having done this, the arytenoids and vocal processes were inspected for erythema or evidence of granuloma formation.  The posterior commissure was then inspected for evidence of inflammatory changes in the mucosa and heaping up of mucosal tissue. The patient was then instructed to say the vowel  e .  Adduction of vocal folds to the midline was observed for any evidence of paresis or paralysis of the larynx or asymmetry in rotation of the larynx to the left or right. The patient was asked to breathe and the degree of abduction was noted bilaterally.  Subglottic view of the larynx was obtained for any additional mass lesions or mucosal changes.  Finally the post cricoid was examined for evidence of pooling of secretions, as well as the pharyngeal wall mucosa.   Anesthesia type: 0.25% phenylephrine    "  Findings:  Anatomic/physiological deviations: RNC, right vocal fold paralysis, bowing, left vocal fold paresis                Right vocal process: Marked restriction of mobility   Left vocal process: Some restriction of mobility  Glottal gap: Glottal gap  Supraglottic structures: Normal  Hypopharynx: Normal      Estimated Blood Loss: minimal  Complications: None  Disposition: Patient tolerated the procedure well      Review of Relevant Clinical Data   I personally reviewed:  Radiology: 01/04/2024 NM THYROID ABLATION THERAPY I 131   Impression:  100.4 mCi I-131 therapeutic treatment for papillary  thyroid carcinoma.  01/09/2024 NM THYROID POST ABLATION WB NO IODINE   Impression:  Radiotracer uptake within the residual left thyroid gland without any  definite evidence of residual disease or distant metastases.  08/27/2024 CT CHEST W/O CONTRAST   IMPRESSION:   Stable postsurgical of the thyroidectomy without significant  radiographic findings of the CT chest evaluation to explain patient's  symptoms.  08/27/2024 US HEAD NECK SOFT TISSUE  IMPRESSION:  1.  Soft tissue neck ultrasound with lymph node measurements as  described above.  2.  No suspicious lymphadenopathy.   Labs:  Lab Results   Component Value Date    TSH 0.28 (L) 05/29/2024     Lab Results   Component Value Date     08/27/2023    CO2 21 (L) 08/27/2023    BUN 6.4 08/27/2023    PHOS 4.3 09/27/2023     Lab Results   Component Value Date    WBC 5.4 01/04/2024    HGB 15.9 01/04/2024    HCT 47.6 01/04/2024    MCV 85 01/04/2024     01/04/2024     Lab Results   Component Value Date    PTT 29 03/03/2023    INR 1.06 03/03/2023     No results found for: \"BERLIN\"  No components found for: \"RHEUMATOIDFACTOR\", \"RF\"  No results found for: \"CRP\"  No components found for: \"CKTOT\", \"URICACID\"  No components found for: \"C3\", \"C4\", \"DSDNAAB\", \"NDNAABIFA\"  No results found for: \"MPOAB\"    Patient reported Quality of Life (QOL) Measures   Patient Supplied Answers To " "VHI Questionnaire      10/26/2023     8:15 AM   Voice Handicap Index (VHI-10)   My voice makes it difficult for people to hear me 2   People have difficulty understanding me in a noisy room 4   My voice difficulties restrict my personal and social life.  3   I feel left out of conversations because of my voice 2   My voice problem causes me to lose income 1   I feel as though I have to strain to produce voice 1   The clarity of my voice is unpredictable 2   My voice problem upsets me 4   My voice makes me feel handicapped 3   People ask, \"What's wrong with your voice?\" 2   VHI-10 24         Patient Supplied Answers To EAT Questionnaire      10/26/2023     8:16 AM   Eating Assessment Tool (EAT-10)   My swallowing problem has caused me to lose weight 0   My swallowing problem interferes with my ability to go out for meals 0   Swallowing liquids takes extra effort 2   Swallowing solids takes extra effort 0   Swallowing pills takes extra effort 0   Swallowing is painful 0   The pleasure of eating is affected by my swallowing 0   When I swallow food sticks in my throat 0   I cough when I eat 0   Swallowing is stressful 0   EAT-10 2         Patient Supplied Answers To CSI Questionnaire      10/26/2023     8:18 AM   Cough Severity Index (CSI)   My cough is worse when I lie down 0   My coughing problem causes me to restrict my personal and social life 0   I tend to avoid places because of my cough problem 0   I feel embarrassed because of my coughing problem 0   People ask, ''What's wrong?'' because I cough a lot 0   I run out of air when I cough 0   My coughing problem affects my voice 0   My coughing problem limits my physical activity 0   My coughing problem upsets me 0   People ask me if I am sick because I cough a lot 0   CSI Score 0         Patient Supplied Answers to Dyspnea Index Questionnaire:       No data to display                Scribe Disclosure:   I, Taylor High, am serving as a scribe; to document services " personally performed by Tessy Dale MD -based on data collection and the provider's statements to me.     Provider Disclosure:  I agree with above History, Review of Systems, Physical exam and Plan.  I have reviewed the content of the documentation and have edited it as needed. I have personally performed the services documented here and the documentation accurately represents those services and the decisions I have made.      Electronically signed by:    Tessy Dale MD    Laryngology    [facility]  Department of  Otolaryngology - Head and Neck Surgery  [facility] [place] [address]  Appointment line: [phone]  Fax: [phone]  [email or web address]

## 2024-09-03 ENCOUNTER — OFFICE VISIT (OUTPATIENT)
Dept: OTOLARYNGOLOGY | Facility: CLINIC | Age: 39
End: 2024-09-03
Payer: COMMERCIAL

## 2024-09-03 ENCOUNTER — PATIENT OUTREACH (OUTPATIENT)
Dept: OTOLARYNGOLOGY | Facility: CLINIC | Age: 39
End: 2024-09-03

## 2024-09-03 VITALS — BODY MASS INDEX: 25.61 KG/M2 | WEIGHT: 150 LBS | HEIGHT: 64 IN

## 2024-09-03 DIAGNOSIS — J38.00 VOCAL FOLD PARESIS: ICD-10-CM

## 2024-09-03 DIAGNOSIS — C73 MALIGNANT NEOPLASM OF THYROID GLAND (H): ICD-10-CM

## 2024-09-03 DIAGNOSIS — J38.01 VOCAL FOLD PARESIS, LEFT: ICD-10-CM

## 2024-09-03 DIAGNOSIS — J38.01 VOCAL FOLD PARALYSIS, RIGHT: ICD-10-CM

## 2024-09-03 DIAGNOSIS — R49.0 DYSPHONIA: Primary | ICD-10-CM

## 2024-09-03 DIAGNOSIS — J38.3 PARADOXICAL VOCAL FOLD MOTION DISORDER: Primary | ICD-10-CM

## 2024-09-03 DIAGNOSIS — J38.00 VOCAL CORD PARALYSIS: Primary | ICD-10-CM

## 2024-09-03 DIAGNOSIS — R49.0 DYSPHONIA: ICD-10-CM

## 2024-09-03 PROCEDURE — 92524 BEHAVRAL QUALIT ANALYS VOICE: CPT | Mod: GN | Performed by: SPEECH-LANGUAGE PATHOLOGIST

## 2024-09-03 PROCEDURE — T1013 SIGN LANG/ORAL INTERPRETER: HCPCS | Mod: U4 | Performed by: INTERPRETER

## 2024-09-03 PROCEDURE — 92507 TX SP LANG VOICE COMM INDIV: CPT | Mod: GN | Performed by: SPEECH-LANGUAGE PATHOLOGIST

## 2024-09-03 PROCEDURE — 31575 DIAGNOSTIC LARYNGOSCOPY: CPT | Performed by: OTOLARYNGOLOGY

## 2024-09-03 PROCEDURE — 99215 OFFICE O/P EST HI 40 MIN: CPT | Mod: 25 | Performed by: OTOLARYNGOLOGY

## 2024-09-03 NOTE — PATIENT INSTRUCTIONS
"After Visit Summary    Patient: Leonid Mcmillan  Date of Visit: 9/3/2024    These notes are also available in your MyChart. Please take a few moments to find them under \"Past Appointments\" in the [employer] system, as Jerilyn will start to phase out e-mail communications.    \"Handouts\" that go along with today's order of activities include (below):   Frequency of practice: 2-3x/day unless marked otherwise    Order of today's appointment:  Breathing:   In the morning and evening (twice daily) for 2-5 minutes:   Breathe while lying on your back with your face and knees up. Hands on tummy and chest.  Take a breath in with rounded lips and exhale with a  [id number]    Inhale  = Inflate; exhale = deflate  3x each: try breathin in/8 out,  Throughout the day (2-3x/day for just a couple minutes) check breathing while keeping shoulders relaxed (riding to and from school, work, etc.)    Rescue Breathing Techniques:  1. Breathing in through rounded lips and out with a \"sh\"  2. Breathing in through the nose and out with \"sh\"  3. Repeated sniffs/inhales through rounded lips and out with a \"sh\"    Breathing Tips:  Tongue behind the lower teeth  Tongue up when exposed to cold air  Keep shoulders down and chest relaxed  Don t overextend your neck  Lead from your chest  Keeping head upright   Feel the weight in your elbows  Keep breathing when you stop an activity  Finding recovery pose  Hands behind the back  Hands on the knees standing  Elbows on the knees seated  Thera-band use during practices  Match your breathing rate with the rate of step   Inhale/ hold 1-2 seconds/ exhale  Breathe through turns     Jerilyn Evans M.M. (voice), M.A., CCC/SLP  Speech-Language Pathologist  [facility] Certified Vocologist  [facility]  [email or web address]  she/her    "

## 2024-09-03 NOTE — PROGRESS NOTES
[facility] VOICE [facility]  Moose Yang Jr., M.D., F.A.C.S.  Cherie Cano M.D., M.P.H.  Tessy Dale M.D.  UNIQUE Donohue, M.M. (voice), M.A., CCC-SLP  Esperanza Ying M.S., CCC-SLP  Ana Ireland, Ph.D., CCC-SLP  Sinan Henderson, Ph.D., CCC-SLP  Leona Loco, M.S., CCC-SLP  Matteo Gibson M.M., M.A., CCC-SLP  GILEMR Goldsmith (voice), M.S., CCC-SLP    [facility]  VOICE/SPEECH/BREATHING THERAPY  CLINICAL FOLLOW-UP AND LARYNGEAL EXAMINATION REPORT - IN PERSON    Clinician: UNIQUE Donohue, M.MRodolfo (voice), M.A., CCC-SLP  Seen in conjunction with: Dr. Tessy Dale  Referring physician:  Andrew  Patient: Leonid Mcmillan  Date of Visit: 9/3/2024    HISTORY  PATIENT INFORMATION  Leonid Mcmillan was seen for follow-up in conjunction with a visit to Dr. Tessy Dale today.  Please also refer to Dr. Dale's dictation.  He was last seen on 11/16/24 by voice therapy.    PROGRESS SINCE LAST VISIT  Mr. Mcmillan reports:  (Also clarified with his wife and ) - He began to experience more difficulty after the right vocal fold augmentation with Dr. Dale completed on 10/26/24  voice quality acceptable. He is not concerned with his voice quality.  He is more concerned about this breathing difficulties, especially when completing physical activity and while sleeping.    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  Perceptually, his voice is acceptable in terms of quality and loudness, and he has no complaints. Today, his concern is that his he is having increased difficulties breathing. He reported that he has had increased difficulties breathing since his right vocal fold augmentation last October. However, during today's laryngeal exam, we found that he  now demonstrates a reduction in the mobility of his left vocal fold compared with his last exam in October of 2023.  After today's follow-up evaluation we proceeded with therapy to work on improving his breathing issues, this was helpful and I also provided additional counseling regarding today's findings of a reduced left vocal fold mobility in conjunction with the ongoing reduction of the right vocal fold mobility.    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 Perceptually, his voice is louder and clearer than before his vocal fold augmentation with moderate weakness and slight, intermittent diplophonia. Laryngoscopy today demonstrated continued R vocal fold immobility in a paramedian position with improved glottic closure. If further voice intervention is needed, this will take place in January 2024 with a Juvederm augmentation with Dr. Dale after his radioactive iodine treatments. At this time, no further voice therapy services are needed. Leonid is in agreement with this plan of care.      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 Perceptually, his voice demonstrated moderate to marked roughness (diplophonia), strain, weakness, and breathiness. Laryngoscopy today demonstrated R vocal fold immobility in a paramedian position with incomplete glottic closure and mediolateral compression with phonation with the L ventricular fold. Therefore, no voice therapy interventions are  "recommended at this time. I assisted Dr. Dale with a Prolaryn injection into the R vocal fold today, and Leonid's voice was free of diplophonia during phonation attempts following the procedure. His wife reported that his voice sounded \"normal\" afterwards. Leonid will return to see Dr. Dale as needed. He is in agreement with this plan of care.         Updated patient history:     Leonid reports that his voice was sounding louder and clearer after his vocal fold augmentation but not fully back to his pre-thyroidectomy voice. His wife notes that it has began getting quieter recently. He notes voice breaks with increased volume and difficulties coughing up mucus. His wife endorses very loud snoring, which is new for him after the injection. With regard to swallowing, he has had little to no coughing with drinking since the augmentation; however, he notes slight, gradual changes to his swallowing compared to immediately after.       PATIENT REPORTED MEASURES  Patient Supplied Answers To VHI Questionnaire      10/26/2023     8:15 AM   Voice Handicap Index (VHI-10)   My voice makes it difficult for people to hear me 2   People have difficulty understanding me in a noisy room 4   My voice difficulties restrict my personal and social life.  3   I feel left out of conversations because of my voice 2   My voice problem causes me to lose income 1   I feel as though I have to strain to produce voice 1   The clarity of my voice is unpredictable 2   My voice problem upsets me 4   My voice makes me feel handicapped 3   People ask, \"What's wrong with your voice?\" 2   VHI-10 24       Patient Supplied Answers To CSI Questionnaire      10/26/2023     8:18 AM   Cough Severity Index (CSI)   My cough is worse when I lie down 0   My coughing problem causes me to restrict my personal and social life 0   I tend to avoid places because of my cough problem 0   I feel embarrassed because of my coughing problem 0   People ask, ''What's wrong?'' because " I cough a lot 0   I run out of air when I cough 0   My coughing problem affects my voice 0   My coughing problem limits my physical activity 0   My coughing problem upsets me 0   People ask me if I am sick because I cough a lot 0   CSI Score 0       Patient Supplied Answers To EAT Questionnaire      10/26/2023     8:16 AM   Eating Assessment Tool (EAT-10)   My swallowing problem has caused me to lose weight 0   My swallowing problem interferes with my ability to go out for meals 0   Swallowing liquids takes extra effort 2   Swallowing solids takes extra effort 0   Swallowing pills takes extra effort 0   Swallowing is painful 0   The pleasure of eating is affected by my swallowing 0   When I swallow food sticks in my throat 0   I cough when I eat 0   Swallowing is stressful 0   EAT-10 2       OBJECTIVE FINDINGS  PERCEPTUAL EVALUATION (CPT 65691)  POSTURE / TENSION:   WNL    BREATHING:   appears within normal limits and adequate   clavicular elevation on inspiration  shoulder and neck involvement  abdominal contraction at initiation of phonation  phonation is not coordinated with respiration    LARYNGEAL PALPATION: n/a    VOICE:  Roughness: Mild Intermittent  Breathiness: WNL  Strain: WNL  Loudness  Conversational speech:  WNL  Projected speech:  n/a  Pitch:  F0/ Habitual Pitch: WNL for gender  Global Overall Severity:  5/100    COUGH/THROAT CLEARING:  Not observed    LARYNGEAL FUNCTION STUDIES (CPT 68752)  N/a    LARYNGEAL EXAMINATION  Procedure: Flexible endoscopy with chip-tip technology without stroboscopy, left nostril; topical anesthesia with 3% Lidocaine and 0.25% phenylephrine was applied.   Performed by: Dr. Dale  The laryngeal and pharyngeal structures were evaluated for gross appearance, mobility, function, and focal lesions / abnormalities of the associated mucosa.    All findings were within normal limits with the exception of the following salient features:   This exam shows the following:    Posterior  commissure: Normal  Secretions: no pooling; WNL    Movement:  Right Vocal Fold: marked reduction  Left Vocal Fold: moderate reduction    Glottic Closure: compete; intermittently mildly weak  Upper Airway: acceptable, but limited    Vocal Fold Findings:  Right Vocal Fold: mild to moderate atrophy  Left Vocal Fold: subtle atrophy    STROBOSCOPY: not warranted    THERAPY PROBES: Improvement was elicited with use of forward resonant stimuli, coordination of respiration and phonation, use of glottic coup to promote vocal fold closure, and use of rescue breathing strategies      Abducted view - airway is adequate, but limited.    The laryngeal exam was reviewed with Mr. Mcmillan, and I provided pertinent explanations, as well as written and oral information.    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  Perceptually, his voice is acceptable in terms of quality and loudness, and he has no complaints. Today, his concern is that his he is having increased difficulties breathing. He reported that he has had increased difficulties breathing since his right vocal fold augmentation last October. However, during today's laryngeal exam, we found that he now demonstrates a reduction in the mobility of his left vocal fold compared with his last exam in October of 2023.  After today's follow-up evaluation we proceeded with therapy to work on improving his breathing issues, this was helpful and I also provided additional counseling regarding today's findings of a reduced left vocal fold mobility in conjunction with the ongoing reduction of the right vocal fold mobility.    Remarkable findings included:  Perceptual evaluation demonstrated:   Roughness: Mild Intermittent  Breathiness: WNL  Strain:  WNL  Loudness  Conversational speech:  WNL  Projected speech:  n/a  Pitch:  F0/ Habitual Pitch: WNL for gender  Laryngeal exam demonstrated:   New finding of reduced mobility of the left vocal fold and subtle atrophy.  The right vocal fold remains markedly limited in mobility and demonstrates mild to moderate atrophy.  Adduction is frequently complete, but can be mildly weak.  Primary complaint of patient today included: increased difficulties breathing and he also endorsed inspiratory stridor occurs during daily physical activities, and increased snoring at night.     Therefore, we recommended a course of speech therapy to address these concerns.    In addition:  Dr. Dale will talk to Dr. Galvez about the changes and additional testing recommendations for why the left vocal fold is not moving.   No new surgeries  Speech therapy for breathing    STIMULABILITY: results of therapy probes during perceptual and laryngeal evaluation demonstrate improvement with use of forward resonant stimuli, coordination of respiration and phonation, use of glottic coup to promote vocal fold closure, and use of rescue breathing strategies    RECOMMENDATIONS:   An ongoing course of speech therapy is recommended to optimize vocal technique and improve voice quality.  DURATION / FREQUENCY: I scheduled him for a follow up appointment on the 13th.  He will also be scheduled with TORRES Goldsmith for ongoing thearpy.  He demonstrates a Good prognosis for improvement given adherence to therapeutic recommendations. Therapeutic   Positive indicators: positive response to therapy probes diagnosis is known to respond to treatment high level of comittment  Negative indicators: none at this time    GOALS:  Patient goal:   To understand the problem and fix it as much as possible  To breathe normally and comfortably in all situations    Short-term goal(s): Within the first 4 sessions, Mr. Mcmillan:  will utilize silent inhalation with good  "low-respiratory engagement 75% of the time during therapy tasks with minimal clinician support    Long-term goal(s): In 6 months, Mr. Mcmillan will:  Breathe comfortably over the course of 2 months with no remarkable inspiratory stridor, and report comfortable breathing during all daily activities.     Certification period:   [employer] = [facility] [place]  = secondary  Certification date from: 9/03/24  Certification date to: 12/2/24    Referring Provider: Chito     This treatment plan was developed with the patient who agreed with the recommendations.    _______________________________________________________________________  THERAPY NOTE (CPT 25441)  Date of Service: 9/3/2024    SUBJECTIVE / OBJECTIVE:  Please refer to my evaluation report from today's encounter for full details regarding subjective data, patient reported measures, and diagnostic findings.    THERAPEUTIC ACTIVITIES  Exercises to promote optimal respiratory mechanics  he demonstrated clavicular/neck/shoulder involvement in inhalation  Demonstrated difficulty allowing abdominal relaxation for inhalation  Practiced in a supine position on the massage table, with tactile cue of a hand on the low rib-cage to facilitate awareness of low respiratory engagement.  Progressed to a seated position and walking on the treadmill today.  With clinician support, patient was able to demonstrate improved abdominal relaxation and engagement on inhalation  Optimal exhalation using inward engagement of the abdominal wall with no corresponding collapse of the upper chest cavity was trained using the pulsed \"sh\" task  [place] a respiratory pacing exercise; this was helpful  70% with moderate verbal and visual cues.  Paradoxical abdominal motion was intermittently present.   acceptable improvement in airflow and respiratory mechanics    Counseling and Education  Asked many questions about the nature of his symptoms, and I answered all of these thoroughly.  Concepts " of an optimal regimen for practice were instructed.  He should use an interval schedule of practice, with brief periods of practice frequently throughout each day  Hauser concepts of volitional practice to facilitate motor learning.  A revised regimen for home practice was instructed.    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  Mr. Mcmillan demonstrated acceptable learning with moderate verbal cues today.     PLAN: I will see Mr. Mcmillan in 1 week, at which time we will continue therapy virtually. He will then continue with Mrs. Ayana Jay.     TOTAL SERVICE TIME: 75 minutes  EVALUATION OF VOICE AND RESONANCE (30405)  TREATMENT (67005)  NO CHARGE FACILITY FEE (24509)    Jerilyn Evans M.M. (voice), M.A., CCC/SLP  Speech-Language Pathologist  Certificate of Vocology  [facility]  [phone]  [email or web address]  Pronouns: she/her

## 2024-09-03 NOTE — PROGRESS NOTES
Called patient wife to let her know that care team would like patient to get MRI. Provided patient wife with scheduling number and provided direct number for writer for questions. LVM and requested call back with questions. Maryjane Em RN on 9/3/2024 at 1:41 PM

## 2024-09-03 NOTE — LETTER
9/3/2024       RE: Leonid Mcmillan  [address] [place] [address]     Dear Colleague,    Thank you for referring your patient, Leonid Mcmillan, to the [facility] [place] at [facility]. Please see a copy of my visit note below.        [facility]   at the [facility]   Otolaryngology Clinic     Patient: Leonid Mcmillan    MRN: [id number]    : 1985    Age/Gender: 39 year old male  Date of Service: 9/3/2024  Rendering Provider:   Tessy Dale MD         Impression & Plan     IMPRESSION: Mr. Mcmillan is a 39 year old male who is being seen for the following:    Dysphonia  - ongoing for 2 months  - stable  - in the setting of history of total thyroidectomy with tracheal resection on 23  - speaking voice is affected   - singing voice is affected  - no pain with voice use  - voice demand is low  - scope evaluation shows right vocal fold paralysis with glottal gap  - FEES shows penetration with thins   - discussed etiology of vocal fold paralysis in this case is due to right vocal fold paralysis and CTR  - discussed prognosis of vocal fold paralysis is permanent   - discussed options of treatment which include observation, voice therapy, temporary augmentation vs long term options  - discussed long term options of vocal fold augmentation with injection laryngoplasty with CAHA vs lipoinjection, framework surgery and reinnervation  - given he is so recent from his CTR recommend voice gel injection laryngoplasty to see if voice is improved and if this helps then will need a longer term filler   - a permanent option will be offered next summer once he is 1 year from his CTR  - discussed his pitch will always be low   - right voice gel laryngoplasty was performed 10/26/23 without complication  - symptoms 2023 are improved, feels his voice is back to normal  - scope shows right vocal fold paralysis, good glottal closure  - FEES shows  no penetration anymore with liquids  - discussed if swallowing continues to get worse over the next month, will consider a long term filler lasting 6-9 months vs repeat voice gel laryngoplasty lasting 8-12 weeks vs CAHA lasting 12 months   - symptoms 8/30/2024 are worsened breathing, especially with activity like mowing the lawn, him and his wife report having trouble breathing in and inspiratory stridor  - scope shows right vocal fold paralysis and NEW left vocal fold paresis  - discussed further imaging to identify the reason for his new paresis ( he did have recent neck US and CT chest that were non diagnostic) - reached out to head and neck and endocrinology  - also dicussed optimizing breathing with breathing therapy as well  Plan  - breathing therapy  - reached out to head and neck and endocrinology (re: MRI)    RETURN VISIT: after therapy     Chief Complaint   Right vocal cord paralysis  Dysphonia  S/p voice gel laryngoplasty 10/26/23  Interval History   HISTORY OF PRESENT ILLNESS: Mr. Mcmillan is a 39 year old male is being followed for dysphonia . He was initially seen on 10/26/23. Please refer to this note for full history.   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 He will be undergoing adjuvant radiation therapy.      Today, he presents for follow up. he reports:  - more breathing issues  - here with his wife     PAST MEDICAL HISTORY:   Past Medical History:   Diagnosis Date     Infection due to 2019 novel coronavirus 02/2023     Papillary thyroid carcinoma (H) 08/24/2023     Postoperative hypothyroidism 08/24/2023     Thyroid mass 2014       PAST SURGICAL HISTORY:   Past Surgical History:   Procedure Laterality Date     BRONCHOSCOPY FLEXIBLE AND RIGID N/A 7/13/2023    Procedure: Flexible bronchoscopy, direct laryngoscopy;  Surgeon: Antonio Morales,  MD;  Location: [facility] OR     ESOPHAGOSCOPY N/A 7/13/2023    Procedure: ESOPHAGOSCOPY;  Surgeon: Antonio Morales MD;  Location: [facility] OR     ESOPHAGOSCOPY N/A 8/24/2023    Procedure: Esophagoscopy;  Surgeon: Antonio Morales MD;  Location: [facility] OR     LARYNGOSCOPY, BRONCHOSCOPY, COMBINED N/A 8/24/2023    Procedure: direct laryngoscopy, flexible Bronchoscopy;  Surgeon: Antonio Morales MD;  Location: [facility] OR     RESECT TRACHEA WITH RECONSTRUCTION N/A 8/24/2023    Procedure: tracheal resection;  Surgeon: Pieter Ballard MD;  Location: [facility] OR     SHOULDER SURGERY Right 2005    shoulder injury, did not have general anesthesia     THYROIDECTOMY N/A 8/24/2023    Procedure: total thyroidectomy;  Surgeon: Antonio Morales MD;  Location: [facility] OR       CURRENT MEDICATIONS:   Current Outpatient Medications:      levothyroxine (SYNTHROID/LEVOTHROID) 150 MCG tablet, Take 1 tablet (150 mcg) by mouth daily, Disp: 90 tablet, Rfl: 4    ALLERGIES: Patient has no known allergies.    SOCIAL HISTORY:    Social History     Socioeconomic History     Marital status:      Spouse name: Not on file     Number of children: Not on file     Years of education: Not on file     Highest education level: Not on file   Occupational History     Not on file   Tobacco Use     Smoking status: Never     Passive exposure: Never     Smokeless tobacco: Never   Vaping Use     Vaping status: Never Used   Substance and Sexual Activity     Alcohol use: Never     Drug use: Never     Sexual activity: Not on file   Other Topics Concern     Not on file   Social History Narrative    Moved here in 2014     Social Determinants of Health     Financial Resource Strain: Low Risk  (10/18/2023)    Financial Resource Strain      Within the past 12 months, have you or your family members you live with been unable to get utilities (heat, electricity) when it was really needed?: No   Food Insecurity: Low Risk  (10/18/2023)    Food Insecurity      Within the  past 12 months, did you worry that your food would run out before you got money to buy more?: No      Within the past 12 months, did the food you bought just not last and you didn t have money to get more?: No   Transportation Needs: Low Risk  (10/18/2023)    Transportation Needs      Within the past 12 months, has lack of transportation kept you from medical appointments, getting your medicines, non-medical meetings or appointments, work, or from getting things that you need?: No   Physical Activity: Not on file   Stress: Not on file   Social Connections: Not on file   Interpersonal Safety: Low Risk  (10/18/2023)    Interpersonal Safety      Do you feel physically and emotionally safe where you currently live?: Yes      Within the past 12 months, have you been hit, slapped, kicked or otherwise physically hurt by someone?: No      Within the past 12 months, have you been humiliated or emotionally abused in other ways by your partner or ex-partner?: No   Housing Stability: Low Risk  (10/18/2023)    Housing Stability      Do you have housing? : Yes      Are you worried about losing your housing?: No         FAMILY HISTORY:   Family History   Problem Relation Age of Onset     Diabetes Father      Anesthesia Reaction No family hx of      Venous thrombosis No family hx of      Myocardial Infarction No family hx of      Cerebrovascular Disease No family hx of      Thyroid Cancer No family hx of      Goiter No family hx of      Cancer No family hx of       Non-contributory for problems with anesthesia    REVIEW OF SYSTEMS:   The patient was asked a 14 point review of systems regarding constitutional symptoms, eye symptoms, ears, nose, mouth, throat symptoms, cardiovascular symptoms, respiratory symptoms, gastrointestinal symptoms, genitourinary symptoms, musculoskeletal symptoms, integumentary symptoms, neurological symptoms, psychiatric symptoms, endocrine symptoms, hematologic/lymphatic symptoms, and allergic/  immunologic symptoms.   The pertinent factors have been included in the HPI and below.  Patient Supplied Answers to Review of Systems      11/16/2023     8:47 AM   [facility] ROS   Cardiopulmonary Cough   Gastrointestinal/Genitourinary Heartburn/indigestion   Endocrine Heat or cold intolerance       Physical Examination   The patient underwent a physical examination as described below. The pertinent positive and negative findings are summarized after the description of the examination.  Constitutional: The patient's developmental and nutritional status was assessed. The patient's voice quality was assessed.  Head and Face: The head and face were inspected for deformities. The sinuses were palpated. The salivary glands were palpated. Facial muscle strength was assessed bilaterally.  Eyes: Extraocular movements and primary gaze alignment were assessed.  Ears, Nose, Mouth and Throat: The ears and nose were examined for deformities. The nasal septum, mucosa, and turbinates were inspected by anterior rhinoscopy. The lips, teeth, and gums were examined for abnormalities. The oral mucosa, tongue, palate, tonsils, lateral and posterior pharynx were inspected for the presence of asymmetry or mucosal lesions.    Neck: The tracheal position was noted, and the neck mass palpated to determine if there were any asymmetries, abnormal neck masses, thyromegally, or thyroid nodules.  Respiratory: The nature of the breathing and chest expansion/symmetry was observed.  Cardiovascular: The patient was examined to determine the presence of any edema or jugular venous distension.  Abdomen: The contour of the abdomen was noted.  Lymphatic: The patient was examined for infraclavicular lymphadenopathy.  Musculoskeletal: The patient was inspected for the presence of skeletal deformities.  Extremities: The extremities were examined for any clubbing or cyanosis.  Skin: The skin was examined for inflammatory or neoplastic conditions.  Neurologic:  The patient's orientation, mood, and affect were noted. The cranial nerve  functions were examined.  Other pertinent positive and negative findings on physical examination:   OC/OP: no lesions, uvula midline, soft palate elevates symmetrically   Neck: no lesions, no TH tenderness to palpation  Healed incisions  All other physical examination findings were within normal limits and noncontributory.    Procedures   Flexible laryngoscopy (CPT 04242)        Pre-procedure diagnosis: dysphonia  Post-procedure diagnosis: same as above  Indication for procedure: Mr. Mcmillan is a 38 year old male with see above  Procedure(s): Fiberoptic Laryngoscopy     Details of Procedure: After informed consent was obtained, the patient was seated in the examination chair.  The areas of the nasopharynx as well as the hypopharynx were anesthetized with topical 4% lidocaine with 0.25% phenylephrine atomizer.  Examination of the base of tongue was performed first.  Attention was directed to any evidence of masses in the area or evidence of leukoplakia or candidal infection.  Attention was directed to the epiglottis where its size and position was determined and its movement on phonation of the vowel  e .  The piriform sinuses were then inspected for any mass lesions or pooling of secretions.  Attention was then directed to the larynx. The vocal folds were inspected for infection or any areas of leukoplakia, for masses, polypoid degeneration, or hemorrhage.  Having done this, the arytenoids and vocal processes were inspected for erythema or evidence of granuloma formation.  The posterior commissure was then inspected for evidence of inflammatory changes in the mucosa and heaping up of mucosal tissue. The patient was then instructed to say the vowel  e .  Adduction of vocal folds to the midline was observed for any evidence of paresis or paralysis of the larynx or asymmetry in rotation of the larynx to the left or right. The patient was asked to  breathe and the degree of abduction was noted bilaterally.  Subglottic view of the larynx was obtained for any additional mass lesions or mucosal changes.  Finally the post cricoid was examined for evidence of pooling of secretions, as well as the pharyngeal wall mucosa.   Anesthesia type: 0.25% phenylephrine     Findings:  Anatomic/physiological deviations: RNC, right vocal fold paralysis, bowing, left vocal fold paresis                Right vocal process: Marked restriction of mobility   Left vocal process: Some restriction of mobility  Glottal gap: Glottal gap  Supraglottic structures: Normal  Hypopharynx: Normal      Estimated Blood Loss: minimal  Complications: None  Disposition: Patient tolerated the procedure well      Review of Relevant Clinical Data   I personally reviewed:  Radiology: 01/04/2024 NM THYROID ABLATION THERAPY I 131   Impression:  100.4 mCi I-131 therapeutic treatment for papillary  thyroid carcinoma.  01/09/2024 NM THYROID POST ABLATION WB NO IODINE   Impression:  Radiotracer uptake within the residual left thyroid gland without any  definite evidence of residual disease or distant metastases.  08/27/2024 CT CHEST W/O CONTRAST   IMPRESSION:   Stable postsurgical of the thyroidectomy without significant  radiographic findings of the CT chest evaluation to explain patient's  symptoms.  08/27/2024 US HEAD NECK SOFT TISSUE  IMPRESSION:  1.  Soft tissue neck ultrasound with lymph node measurements as  described above.  2.  No suspicious lymphadenopathy.   Labs:  Lab Results   Component Value Date    TSH 0.28 (L) 05/29/2024     Lab Results   Component Value Date     08/27/2023    CO2 21 (L) 08/27/2023    BUN 6.4 08/27/2023    PHOS 4.3 09/27/2023     Lab Results   Component Value Date    WBC 5.4 01/04/2024    HGB 15.9 01/04/2024    HCT 47.6 01/04/2024    MCV 85 01/04/2024     01/04/2024     Lab Results   Component Value Date    PTT 29 03/03/2023    INR 1.06 03/03/2023     No results  "found for: \"BERLIN\"  No components found for: \"RHEUMATOIDFACTOR\", \"RF\"  No results found for: \"CRP\"  No components found for: \"CKTOT\", \"URICACID\"  No components found for: \"C3\", \"C4\", \"DSDNAAB\", \"NDNAABIFA\"  No results found for: \"MPOAB\"    Patient reported Quality of Life (QOL) Measures   Patient Supplied Answers To VHI Questionnaire      10/26/2023     8:15 AM   Voice Handicap Index (VHI-10)   My voice makes it difficult for people to hear me 2   People have difficulty understanding me in a noisy room 4   My voice difficulties restrict my personal and social life.  3   I feel left out of conversations because of my voice 2   My voice problem causes me to lose income 1   I feel as though I have to strain to produce voice 1   The clarity of my voice is unpredictable 2   My voice problem upsets me 4   My voice makes me feel handicapped 3   People ask, \"What's wrong with your voice?\" 2   VHI-10 24         Patient Supplied Answers To EAT Questionnaire      10/26/2023     8:16 AM   Eating Assessment Tool (EAT-10)   My swallowing problem has caused me to lose weight 0   My swallowing problem interferes with my ability to go out for meals 0   Swallowing liquids takes extra effort 2   Swallowing solids takes extra effort 0   Swallowing pills takes extra effort 0   Swallowing is painful 0   The pleasure of eating is affected by my swallowing 0   When I swallow food sticks in my throat 0   I cough when I eat 0   Swallowing is stressful 0   EAT-10 2         Patient Supplied Answers To CSI Questionnaire      10/26/2023     8:18 AM   Cough Severity Index (CSI)   My cough is worse when I lie down 0   My coughing problem causes me to restrict my personal and social life 0   I tend to avoid places because of my cough problem 0   I feel embarrassed because of my coughing problem 0   People ask, ''What's wrong?'' because I cough a lot 0   I run out of air when I cough 0   My coughing problem affects my voice 0   My coughing problem " limits my physical activity 0   My coughing problem upsets me 0   People ask me if I am sick because I cough a lot 0   CSI Score 0         Patient Supplied Answers to Dyspnea Index Questionnaire:       No data to display                Scribe Disclosure:   I, Taylor High, am serving as a scribe; to document services personally performed by Tessy Dale MD -based on data collection and the provider's statements to me.     Provider Disclosure:  I agree with above History, Review of Systems, Physical exam and Plan.  I have reviewed the content of the documentation and have edited it as needed. I have personally performed the services documented here and the documentation accurately represents those services and the decisions I have made.      Electronically signed by:    Tessy Dale MD    Laryngology    [facility]  Department of  Otolaryngology - Head and Neck Surgery  [facility] [place] [address]  Appointment line: [phone]  Fax: [phone]  [email or web address]          Again, thank you for allowing me to participate in the care of your patient.      Sincerely,    Tessy Dale MD

## 2024-09-03 NOTE — PROGRESS NOTES
Outpatient Speech Language Therapy Evaluation  PLAN OF TREATMENT FOR OUTPATIENT REHABILITATION  Patient's Last Name, First Name, M.I.  YOB: 1985  Deyanira,Leonid                           Provider's Name  Jerilyn Evans, SLP Medical Record No.  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    BARRIERS TO LEARNING/TEACHING AND LEARNING NEEDS  None/Unremarkable    GOALS:  Patient goal:   To understand the problem and fix it as much as possible  To breathe normally and comfortably in all situations    Short-term goal(s): Within the first 4 sessions, Mr. Mcmillan:  will utilize silent inhalation with good low-respiratory engagement 75% of the time during therapy tasks with minimal clinician support    Long-term goal(s): In 6 months, Mr. Mcmillan will:  Breathe comfortably over the course of 2 months with no remarkable inspiratory stridor, and report comfortable breathing during all daily activities.   _________________________________________________________________________________    I CERTIFY THE NEED FOR THESE SERVICES FURNISHED UNDER        THIS PLAN OF TREATMENT AND WHILE UNDER MY CARE     (Physician attestation of this document indicates review and certification of the therapy plan).     BCBS = Primary  [employer] [facility] [place]AP [place]  =  secondary  Certification date from: 9/03/24  Certification date to: 12/2/24    Referring Provider: Chito

## 2024-09-03 NOTE — PATIENT INSTRUCTIONS
1.  You were seen in the ENT Clinic today by Dr. Dale. If you have any questions or concerns after your appointment, please call [phone]. Press option #1 for scheduling related needs. Press option #3 for Nurse advice.    2.  Dr. Dale has recommended the following:   - Breathing therapy   - Will speak with your team regarding the new finding of left sided vocal fold paresis    3.  Plan is to return to clinic after breathing therapy    How to Contact Us:  Send a Chevia message to your provider. Our team will respond to you via [employer]. Occasionally, we will need to call you to get further information.  For urgent matters (Monday-Friday, 8:00 AM-3:30 PM), call the ENT Clinic: [phone] and speak with a call center team member - they will route your call appropriately.   If you'd like to speak directly with a nurse, please call [phone]. We do our best to check voicemail frequently throughout the day, and will work to call you back within 1-2 days. For urgent matters, please use the general clinic phone numbers listed above.      Maryjane Em  [phone]  [facility] Otolaryngology

## 2024-09-13 ENCOUNTER — VIRTUAL VISIT (OUTPATIENT)
Dept: OTOLARYNGOLOGY | Facility: CLINIC | Age: 39
End: 2024-09-13
Payer: COMMERCIAL

## 2024-09-13 DIAGNOSIS — J38.01 VOCAL FOLD PARALYSIS, RIGHT: ICD-10-CM

## 2024-09-13 DIAGNOSIS — J38.00 VOCAL CORD PARALYSIS: ICD-10-CM

## 2024-09-13 DIAGNOSIS — J38.01 VOCAL FOLD PARESIS, LEFT: ICD-10-CM

## 2024-09-13 DIAGNOSIS — R49.0 DYSPHONIA: Primary | ICD-10-CM

## 2024-09-13 DIAGNOSIS — J38.3 PARADOXICAL VOCAL FOLD MOTION DISORDER: ICD-10-CM

## 2024-09-13 PROCEDURE — 92507 TX SP LANG VOICE COMM INDIV: CPT | Mod: GN | Performed by: SPEECH-LANGUAGE PATHOLOGIST

## 2024-09-13 NOTE — PATIENT INSTRUCTIONS
"After Visit Summary    Patient: Leonid Mcmillan  Date of Visit: 9/13/2024    These notes are also available in your MyChart. Please take a few moments to find them under \"Past Appointments\" in the [email or web address] system, as Jerilyn will start to phase out e-mail communications.    \"Handouts\" that go along with today's order of activities include (below):   Frequency of practice: 2-3x/day unless marked otherwise    Order of today's appointment:  3x: In through the nose and out through the mouth.    Coordinate sipping breath with counting (1-2x/day):  Breathe in through rounded lips with tongue behind lower teeth or touching at the bump behind upper teeth:  Breathe in through rounded lips, then speak \"1\"  Breathe in through rounded lips, then speak  \"1-2\"  Breathe in through rounded lips, then speak  \"1-2-3\"  Breathe in through rounded lips, then speak  \"1-2-3-4\"... up to 5, and then keep going up to 10      Continue: 2-3 times per day   - Lunges (5 times): inhale as you go down (rounded lips), shhh as you exhale   - Treadmill (5-10 minutes): try an incline of 2 and walking starting at 1.8 mph and increase speed to challenge yourself    Tips:   - Shift from nasal to rounded lip breathing as you move faster   - Breathing in through rounded lips or the nose will help and keep the vocal folds apart as much as possible to avoid a noise.    - When you start an activity, try inhale and exhale using rounded lips.     Try walking around a lake or hiking with low hills and see how you manage your breathing in through rounded lips and out with a pursed lip blow (like blowing out a candle).    Difficulty breathing when waking:   - Check humidity - try a humidifier at night (I like the [employer] brand)   - Take a couple breaths before you move out of bed. Use the strategy of inhale and shhh as you push your way out of bed.    - Also, check to make sure that reflux is not a component.  One pillow behind the upper back and two for the head to elevate " the body.       UNIQUE Donohue M.M., CCC-SLP  Speech-Language Pathologist - [facility] Supervisor  [facility] Certified Vocologist  [facility]  [email or web address]  she/her

## 2024-09-13 NOTE — PROGRESS NOTES
"Deyanira is a 39 year old male who is being cared for via a billable virtual visit.        The patient/client has been notified and verbally consented to the following statements:   This video visit will be conducted between you and your provider.  If during the course of the call the provider feels a video visit is not appropriate, you will not be charged for this service.    Provider has received verbal consent for billable virtual visit from the patient? Yes    Preferred method for receiving information: Mixaloohart     Call initiated at: 9am  Platform used to conduct today's virtual appointment: JORGE Esquivel Video  Location of provider: Residence  Location of patient: Residence. State: [place]  # of Visits: 2  # of Therapy sessions: 1    Benefit & Certification period:   BCBS = [facility] [place]  = secondary  Certification date from: 9/03/24  Certification date to: 12/2/24      [facility]  THERAPY NOTE (CPT 45768)  Patient: Leonid Mcmillan  Date of Service: 9/13/2024  Referring physician: Dr. 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  Perceptually, his voice is acceptable in terms of quality and loudness, and he has no complaints. Today, his concern is that his he is having increased difficulties breathing. He reported that he has had increased difficulties breathing since his right vocal fold augmentation last October. However, during today's laryngeal exam, we found that he now demonstrates a reduction in the mobility of his left vocal fold compared with his last exam in October of 2023.  After today's follow-up evaluation we proceeded with therapy to work on improving his breathing issues, this was helpful and I also provided additional counseling " "regarding today's findings of a reduced left vocal fold mobility in conjunction with the ongoing reduction of the right vocal fold mobility.     Remarkable findings included:  Perceptual evaluation demonstrated:   Roughness: Mild Intermittent  Breathiness: WNL  Strain: WNL  Loudness  Conversational speech:  WNL  Projected speech:  n/a  Pitch:  F0/ Habitual Pitch: WNL for gender  Laryngeal exam demonstrated:   New finding of reduced mobility of the left vocal fold and subtle atrophy.  The right vocal fold remains markedly limited in mobility and demonstrates mild to moderate atrophy.  Adduction is frequently complete, but can be mildly weak.  Primary complaint of patient today included: increased difficulties breathing and he also endorsed inspiratory stridor occurs during daily physical activities, and increased snoring at night.      Therefore, we recommended a course of speech therapy to address these concerns.\"       SUBJECTIVE:  Since the last appointment, Mr. Mcmillan reports the following:   Overall he reports that symptoms are improving  However, not a lot of heavy work, so not sure.     OBJECTIVE:  Mr. Mcmillan presents today with the following:  LARYNGEAL PALPATION: n/a    VOICE:  Roughness: Mild Intermittent  Breathiness: WNL  Strain: WNL  Loudness  Conversational speech:  WNL  Projected speech:  n/a  Pitch:  F0/ Habitual Pitch: WNL for gender  Global Overall Severity:  5/100  COUGH/THROAT CLEARING:  Not observed      PATIENT REPORTED MEASURES:  Patient Supplied Answers To SLP QOL Questionnaire       No data to display              Patient Supplied Answers to Dyspnea Index Questionnaire:      9/13/2024     8:59 AM   Dyspnea Index   1. I have trouble getting air in. 2   2. I feel tightness in my throat when I am having cortney breathing problem. 2   3. It takes more effort to breathe than it used to. 2   4. Change in weather affect my breathing problem. 0   5. My breathing gets worse with stress. 0   6. I make " sound/noise breathing in 2   7. I have to strain to breathe. 1   8. My shortness of breath gets worse with exercise or physical activity 2   9. My breathing problem makes me feel stressed. 3   10. My breathing problem casuses me to restrict my personal and social life. 3   Dyspnea Index Total Score 17   17/40    THERAPEUTIC ACTIVITIES  Demonstrated previous exercises.  demonstrated improved technique  appropriate redirection provided  instruction provided for increased level of complexity/difficulty    Techniques for oral configurations to use during inspiration, to provide better abduction and arrest inhalatory stridor; these included: inhaling through rounded lips; inhaling through the nose with closed lips; and short, repeated sniffs  Techniques for abdominal relaxation during inhalation, to allow for maximum diaphragmatic descent  Techniques for improved contraction of the external intercostals during inhalation, to allow for improved ribcage expansion    We returned to the treadmill/ in his home to work on applying the techniques to exertion.  During this process, Leonid learned:  To use abdominal relaxation and contraction of the external intercostals during inhalation, to allow for maximum diaphragmatic descent  To maintain a high chest posture without shoulder or clavicular elevation during inhalation; he became aware of his propensity to use clavicular muscles, which increases the propensity for paradoxical vocal fold motion; he was able to reduce this propensity with practice today  To use oral configurations to improve the sensation of an open airway  To concentrate on respiratory timing to ensure adequate inhalation and exhalation  To use a mental checklist for self-monitoring his posture, muscle use, and breathing technique    After 30 minutes of exertion, Leonid stated his breathing felt comfortable and he was in no distress.  He stated he would not have been able to run this long or this fast previously,  "and he believed the techniques learned today have allowed him to exert himself without consequences.  He stated he is eager to practice these techniques at home, using his father as a  to provide feedback.  We discussed a regimen for practice before returning to the rigors of his athletic activities.    In addition, he learned:  Exercises to promote optimal respiratory mechanics  [place] a respiratory pacing exercise; this was helpful  acceptable improvement in airflow and respiratory mechanics while speaking    Counseling and Education:  Asked many questions about the nature of his symptoms, and I answered all of these thoroughly.  Provided advice and recommendations for reducing SOB symptoms when waking in the morning.  I also suggested that he work on this with Ms. Jay at his next therapy appointment.   [place] concepts of post-operative voice use and care, to optimize healing.  A revised regimen for home practice was instructed.  I provided an AVS of today's therapeutic activities to facilitate practice.    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 Mr. Mcmillan demonstrated good understanding of the therapeutic activities provided to optimize his breathing, I encouraged him to apply these techniques to more challenging activities and see how he manages his symptoms, as he was been doing well overall.     PLAN: Mr. Mcmillan will continue his therapeutic activities in a couple weeks with Ms. Jay.  For practice goals see AVS.     Next Clinic Appt: 9/30  Plan for SLP: Practice \"getting out of bed\" and keeping the breath comfortable, as this has been more of a struggle (possibly try using the massage table and different positions).    TOTAL SERVICE TIME:   Call " Initiated at: 9 am   Call Ended at: 10 am           CPT Billing Codes:   TREATMENT OF SPEECH, LANGUAGE, VOICE, COMMUNICATION, and/or AUDITORY PROCESSING DISORDER (81854)    Jerilyn Evans M.M. (voice), M.A., CCC/SLP  Speech-Language Pathologist  [facility] Trained Vocologist  [facility] Voice [facility]  [phone]  [email or web address]  Pronouns: she/her      *this report was created in part through the use of computerized dictation software, and though reviewed following completion, some typographic errors may persist.  If there is confusion regarding any of this notes contents, please contact me for clarification

## 2024-09-13 NOTE — LETTER
"9/13/2024       RE: Leonid Mcmillan  [address] [place] [address]     Dear Colleague,    Thank you for referring your patient, Leonid Mcmillan, to the [facility] VOICE CLIN[facility] [place] at [facility]. Please see a copy of my visit note below.    Deyanira is a 39 year old male who is being cared for via a billable virtual visit.        The patient/client has been notified and verbally consented to the following statements:   This video visit will be conducted between you and your provider.  If during the course of the call the provider feels a video visit is not appropriate, you will not be charged for this service.    Provider has received verbal consent for billable virtual visit from the patient? Yes    Preferred method for receiving information: InferXhart     Call initiated at: 9am  Platform used to conduct today's virtual appointment: JORGE Esquivel Video  Location of provider: Residence  Location of patient: Residence. State: [place]  # of Visits: 2  # of Therapy sessions: 1    Benefit & Certification period:   BCBS = [facility] PMAP [place]  = secondary  Certification date from: 9/03/24  Certification date to: 12/2/24      [facility] VOICE [facility]  THERAPY NOTE (CPT 03365)  Patient: Leonid Mcmillan  Date of Service: 9/13/2024  Referring physician: Dr. 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  Perceptually, his voice is acceptable in terms of quality and loudness, and he has no complaints. Today, his concern is that his he is having increased difficulties breathing. He reported that he has had increased difficulties breathing since his right vocal fold augmentation last October. However, during today's laryngeal exam, we found that " "he now demonstrates a reduction in the mobility of his left vocal fold compared with his last exam in October of 2023.  After today's follow-up evaluation we proceeded with therapy to work on improving his breathing issues, this was helpful and I also provided additional counseling regarding today's findings of a reduced left vocal fold mobility in conjunction with the ongoing reduction of the right vocal fold mobility.     Remarkable findings included:  Perceptual evaluation demonstrated:   Roughness: Mild Intermittent  Breathiness: WNL  Strain: WNL  Loudness  Conversational speech:  WNL  Projected speech:  n/a  Pitch:  F0/ Habitual Pitch: WNL for gender  Laryngeal exam demonstrated:   New finding of reduced mobility of the left vocal fold and subtle atrophy.  The right vocal fold remains markedly limited in mobility and demonstrates mild to moderate atrophy.  Adduction is frequently complete, but can be mildly weak.  Primary complaint of patient today included: increased difficulties breathing and he also endorsed inspiratory stridor occurs during daily physical activities, and increased snoring at night.      Therefore, we recommended a course of speech therapy to address these concerns.\"       SUBJECTIVE:  Since the last appointment, Mr. Mcmillan reports the following:   Overall he reports that symptoms are improving  However, not a lot of heavy work, so not sure.     OBJECTIVE:  Mr. Mcmillan presents today with the following:  LARYNGEAL PALPATION: n/a    VOICE:  Roughness: Mild Intermittent  Breathiness: WNL  Strain: WNL  Loudness  Conversational speech:  WNL  Projected speech:  n/a  Pitch:  F0/ Habitual Pitch: WNL for gender  Global Overall Severity:  5/100  COUGH/THROAT CLEARING:  Not observed      PATIENT REPORTED MEASURES:  Patient Supplied Answers To SLP QOL Questionnaire       No data to display              Patient Supplied Answers to Dyspnea Index Questionnaire:      9/13/2024     8:59 AM   Dyspnea Index   1. I " have trouble getting air in. 2   2. I feel tightness in my throat when I am having cortney breathing problem. 2   3. It takes more effort to breathe than it used to. 2   4. Change in weather affect my breathing problem. 0   5. My breathing gets worse with stress. 0   6. I make sound/noise breathing in 2   7. I have to strain to breathe. 1   8. My shortness of breath gets worse with exercise or physical activity 2   9. My breathing problem makes me feel stressed. 3   10. My breathing problem casuses me to restrict my personal and social life. 3   Dyspnea Index Total Score 17   17/40    THERAPEUTIC ACTIVITIES  Demonstrated previous exercises.  demonstrated improved technique  appropriate redirection provided  instruction provided for increased level of complexity/difficulty    Techniques for oral configurations to use during inspiration, to provide better abduction and arrest inhalatory stridor; these included: inhaling through rounded lips; inhaling through the nose with closed lips; and short, repeated sniffs  Techniques for abdominal relaxation during inhalation, to allow for maximum diaphragmatic descent  Techniques for improved contraction of the external intercostals during inhalation, to allow for improved ribcage expansion    We returned to the treadmill/ in his home to work on applying the techniques to exertion.  During this process, Leonid learned:  To use abdominal relaxation and contraction of the external intercostals during inhalation, to allow for maximum diaphragmatic descent  To maintain a high chest posture without shoulder or clavicular elevation during inhalation; he became aware of his propensity to use clavicular muscles, which increases the propensity for paradoxical vocal fold motion; he was able to reduce this propensity with practice today  To use oral configurations to improve the sensation of an open airway  To concentrate on respiratory timing to ensure adequate inhalation and exhalation  To use  a mental checklist for self-monitoring his posture, muscle use, and breathing technique    After 30 minutes of exertion, Leonid stated his breathing felt comfortable and he was in no distress.  He stated he would not have been able to run this long or this fast previously, and he believed the techniques learned today have allowed him to exert himself without consequences.  He stated he is eager to practice these techniques at home, using his father as a  to provide feedback.  We discussed a regimen for practice before returning to the rigors of his athletic activities.    In addition, he learned:  Exercises to promote optimal respiratory mechanics  [place] a respiratory pacing exercise; this was helpful  acceptable improvement in airflow and respiratory mechanics while speaking    Counseling and Education:  Asked many questions about the nature of his symptoms, and I answered all of these thoroughly.  Provided advice and recommendations for reducing SOB symptoms when waking in the morning.  I also suggested that he work on this with Ms. Jay at his next therapy appointment.   Sekiu concepts of post-operative voice use and care, to optimize healing.  A revised regimen for home practice was instructed.  I provided an AVS of today's therapeutic activities to facilitate practice.    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 Mr. Mcmillan demonstrated good understanding of the therapeutic activities provided to optimize his breathing, I encouraged him to apply these techniques to more challenging activities and see how he manages his symptoms, as he was been doing well overall.     PLAN: Mr. Mcmillan will continue his therapeutic activities in a couple weeks with Ms.  "Pierre.  For practice goals see AVS.     Next Clinic Appt: 9/30  Plan for SLP: Practice \"getting out of bed\" and keeping the breath comfortable, as this has been more of a struggle (possibly try using the massage table and different positions).    TOTAL SERVICE TIME:   Call Initiated at: 9 am   Call Ended at: 10 am           CPT Billing Codes:   TREATMENT OF SPEECH, LANGUAGE, VOICE, COMMUNICATION, and/or AUDITORY PROCESSING DISORDER (50833)    Jerilyn Evans M.M. (voice), M.A., CCC/SLP  Speech-Language Pathologist  [facility] Trained Vocologist  [facility]  [phone]  [email or web address]  Pronouns: she/her      *this report was created in part through the use of computerized dictation software, and though reviewed following completion, some typographic errors may persist.  If there is confusion regarding any of this notes contents, please contact me for clarification      Again, thank you for allowing me to participate in the care of your patient.      Sincerely,    Jerilyn Evans, SLP    "

## 2024-09-30 ENCOUNTER — OFFICE VISIT (OUTPATIENT)
Dept: OTOLARYNGOLOGY | Facility: CLINIC | Age: 39
End: 2024-09-30
Payer: COMMERCIAL

## 2024-09-30 DIAGNOSIS — R49.0 DYSPHONIA: ICD-10-CM

## 2024-09-30 DIAGNOSIS — J38.00 VOCAL CORD PARALYSIS: Primary | ICD-10-CM

## 2024-09-30 PROCEDURE — 92507 TX SP LANG VOICE COMM INDIV: CPT | Mod: GN | Performed by: SPEECH-LANGUAGE PATHOLOGIST

## 2024-09-30 NOTE — LETTER
9/30/2024       RE: Leonid Mcmillan  [address] [place] [address]     Dear Colleague,    Thank you for referring your patient, Leonid Mcmillan, to the [facility] [place] [facility]. Please see a copy of my visit note below.    [facility]  PROGRESS REPORT (CPT [id number])    Patient: Leonid Mcmillan  Date of Service: 9/30/2024  Date of Last Service: 9/13/24  Number of Visits: 3/10  Certification Dates: 9/3/24 to 12/2/24  Referring Physician: Dr. Tessy Dale and Dr. Morales  His wife served as  today.     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  Perceptually, his voice is acceptable in terms of quality and loudness, and he has no complaints. Today, his concern is that his he is having increased difficulties breathing. He reported that he has had increased difficulties breathing since his right vocal fold augmentation last October. However, during today's laryngeal exam, we found that he now demonstrates a reduction in the mobility of his left vocal fold compared with his last exam in October of 2023.  After today's follow-up evaluation we proceeded with therapy to work on improving his breathing issues, this was helpful and I also provided additional counseling regarding today's findings of a reduced left vocal fold mobility in conjunction with the ongoing reduction of the right vocal fold mobility.    SUBJECTIVE:  Since Leonid's last session, he reports the following:   He feels like his breathing is a little bit better, particularly with physical activity.  He has been using his strategies.   His breathing is still difficult for a brief moment when getting out of bed in the  "morning. He also has trouble laying on his back in bed, but breathes better while on his side.   His breathing is fine at work.     OBJECTIVE:  Patient Specific Goal Metrics:      9/30/2024     9:00 AM   Dysponia SLP Goals   How would you rate your breathing, if 0 is the worst and 10 is the best? 7   How much does your breathing problem bother you?         A little bit     THERAPEUTIC ACTIVITIES  Today Leonid participated in the following therapeutic activities:  Counseling and Education:  Asked questions about the nature of his symptoms, and I answered all of these thoroughly.    I provided Leonid with explanation and skilled instruction of:  Education and exercises to promote optimal respiratory mechanics were provided:  Explanation of the anatomy and physiology of respiration for phonation; he found this to be helpful  He demonstrated excessive upper thoracic engagement during inhalation  Difficulty allowing abdominal relaxation for inhalation, and audible inhalation prior to instruction  Targeted practice of optimal breathing mechanics with patient positioned in sitting and a forward leaning position  With clinician support, patient was able to demonstrate improved abdominal relaxation and engagement on inhalation  Optimal exhalation using inward engagement of the abdominal wall with no corresponding collapse of the upper chest cavity was trained using the sustained \"sh\" task  Leonid demonstrated adequate accuracy with moderate to maximal clinician support, including verbal explanation, visual demonstration, and tactile reinforcement to facilitate awareness of low respiratory engagement.   Introduction to inhalation portion of several relaxed throat breathing techniques, including tongue tuck, straw sip, sniff, and puppy yawn.   Visual images were created for him to use during home practice, along with verbal description of the techniques as provider was masked. Patient's wife was subsequently able to demonstrate with " excellent accy and aided in getting Leonid to produce a lower pitch, quieter inhalation vs his habitual high pitched, constricted inhalation.   Negative practice was employed and he endorsed feeling the lower pitch/quieter version felt like a better breath.   He demonstrated overall adequate accy and will require additional reinforcement.     Instruction of Home Practice:  A revised regimen for home practice was instructed.  I provided an AVS of important notes of today's therapeutic activities to facilitate practice.    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  He will continue to work on his exercises on a daily basis, and work on incorporating the techniques into his daily activities. Speech therapy continues to be medically necessary for Leonid to participate fully and engage in activities of daily living.     Plan:   I will see Leonid in 1.5 weeks and will plan to review respiratory mechanics and instruct remaining RTB techniques, including straw breath. Add laryngeal release.   For home practice goals, see AVS.     TOTAL SERVICE TIME: 55 minutes  TREATMENT ([id number])    Grace Goldsmith (voice), M.S., CCC-SLP  Speech-Language Pathologist  [facility]  [phone]  [email or web address]  Pronouns: she/her/hers      *this report was created in part through the use of computerized dictation software, and though reviewed following completion, some typographic errors may persist.  If there is confusion regarding any of this notes contents, please contact me for clarification    OBJECTIVE FINDINGS  PATIENT REPORTED MEASURES  Patient Supplied Answers To Last 2 VHI Questionnaires      10/26/2023     8:15 AM 9/3/2024     8:42 AM   Voice Handicap Index (VHI-10)   My  "voice makes it difficult for people to hear me 2 2   People have difficulty understanding me in a noisy room 4 2   My voice difficulties restrict my personal and social life.  3 2   I feel left out of conversations because of my voice 2 2   My voice problem causes me to lose income 1 1   I feel as though I have to strain to produce voice 1 2   The clarity of my voice is unpredictable 2 2   My voice problem upsets me 4 2   My voice makes me feel handicapped 3 2   People ask, \"What's wrong with your voice?\" 2 2   VHI-10 24 19          Patient Supplied Answers To Last 2 CSI Questionnaires      10/26/2023     8:18 AM   Cough Severity Index (CSI)   My cough is worse when I lie down 0   My coughing problem causes me to restrict my personal and social life 0   I tend to avoid places because of my cough problem 0   I feel embarrassed because of my coughing problem 0   People ask, ''What's wrong?'' because I cough a lot 0   I run out of air when I cough 0   My coughing problem affects my voice 0   My coughing problem limits my physical activity 0   My coughing problem upsets me 0   People ask me if I am sick because I cough a lot 0   CSI Score 0          Patient Supplied Answers To Last 2 EAT Questionnaires      10/26/2023     8:16 AM   Eating Assessment Tool (EAT-10)   My swallowing problem has caused me to lose weight 0   My swallowing problem interferes with my ability to go out for meals 0   Swallowing liquids takes extra effort 2   Swallowing solids takes extra effort 0   Swallowing pills takes extra effort 0   Swallowing is painful 0   The pleasure of eating is affected by my swallowing 0   When I swallow food sticks in my throat 0   I cough when I eat 0   Swallowing is stressful 0   EAT-10 2         Patient Supplied Answers to Dyspnea Index Questionnaire:      9/13/2024     8:59 AM   Dyspnea Index   1. I have trouble getting air in. 2   2. I feel tightness in my throat when I am having cortney breathing problem. 2   3. It " takes more effort to breathe than it used to. 2   4. Change in weather affect my breathing problem. 0   5. My breathing gets worse with stress. 0   6. I make sound/noise breathing in 2   7. I have to strain to breathe. 1   8. My shortness of breath gets worse with exercise or physical activity 2   9. My breathing problem makes me feel stressed. 3   10. My breathing problem casuses me to restrict my personal and social life. 3   Dyspnea Index Total Score 17          Again, thank you for allowing me to participate in the care of your patient.      Sincerely,    Ayana Jay, SLP

## 2024-09-30 NOTE — PATIENT INSTRUCTIONS
"Hello!    It was a pleasure to see you today. Please complete the exercises below and remember, a few minutes of practice many times throughout the day is more important than one large practice session. If you have any questions about your strategies, feel free to contact me at [email or web address] or via [employer]. Please call [phone] for scheduling alterations or to be seen sooner in case of cancellations.     Home Exercise Program:  BELLY BREATHING  Stretch: (each morning)  Stretch your arms up and take two slow, deep breaths, feeling your rib cage lift and stretch.    Now lean toward each side, taking 2 slow, deep breaths on each side, feeling your ribcage lift and expand along the sides.   Now leaning back, arching your back to feel a stretch along the front of your ribs, and take 2 more slow, deep breaths. Really let the abdomen expand fully.  Lastly, lean forward so that your head, neck, and arms are completely slack toward the ground. Hold for 30 seconds and allow your back and neck to stretch and take several slow, deep breaths. You should be able to feel a bit of expansion between your spine and shoulder blades as you inhale, then feel the shoulder blades pull back inward on exhalation.      Practice: (5 breaths per hour or 10 breaths at each meal)  Sit hinged forward with your elbows on your knees. OR Sit/stand normally   Decide whether you prefer to place your hands on either side of your belly and back or low ribs, or one of each.  If someone is nearby to help, they can place their hands on the sides of your back and ribs.   Slowly breathe in and feel your belly and back expand, like filling a balloon, pushing out against your waistband  Slowly breathe out (on \"Shhhhhh\") and feel your belly and back crunch inward, like zipping tight pants  Repeat slowly and take breaks if you get dizzy    HELPFUL HINTS:  -- Tie a string or piece of elastic around your low ribs when you get dressed. You'll be " "able to feel yourself expand against this as you breathe all day.   -- Some people find it easiest to practice while laying on their back or in a recliner, hands on their belly.  -- You can also roll onto your stomach, resting your arms above your head, in order to feel more back expansion.   ____________________________________________________    RESCUE BREATHS (4-5 breaths, 2-3 sets/day and IF SHORT OF BREATH)  -- Use a mirror if it helps to see your mouth and lips  -- Remember to focus on SILENT inhalation every time  -- Practice these daily when you're relaxed and focus on correct technique. The more you practice, the easier these become when you are not relaxed.  TONGUE TUCK: Place the tip of your tongue against the roof of your mouth, creating a column in the center. Breathe in and feel cool & silent air sweep in along the sides of your tongue and down the back of your throat. Now keep your tongue tip in place and simply exhale.   This strategy will make your mouth dry, so stop and move some saliva around with your tongue whenever needed, then return to your strategy.   The tongue tuck is easiest to learn and start applying to movement, but doesn't open the vocal folds as forcefully as some other strategies.   STRAW SIP & BULLFROG: Round your lips like a milkshake straw and inhale, feeling cool & silent air sweep through the center of your mouth. Exhale with pursed lips and ballooned or bullfrogged cheeks and throat, or on a \"shhhh\", \"sh sh sh sh sh...\", or \"ffffff\", whichever feels better.   SNIFF (Sniff) & BULLFROG: Decide whether you prefer one long, gentle sniff in through your nose or 2-3 short, quick sniffs. Exhale with pursed lips and ballooned or bullfrogged cheeks and throat, or on a \"shhhh\", \"sh sh sh sh sh...\", or \"ffffff\", whichever feels better. Blow more gently and slower if you feel dizzy.  PUPPY YAWN & BULLFROG: Let your tongue relax flat against your bottom lip like a puppy, keeping your lips " "and jaw open. Silently inhale with a cool, yawny sensation. Next, exhale with pursed lips and ballooned or bullfrogged cheeks and throat, or on a \"shhhh\", \"sh sh sh sh sh...\", or \"ffffff\", whichever feels better. Blow more gently and slower if you feel dizzy.    ____________________________________________________    From your previous session with Dr. Evans:   3x: In through the nose and out through the mouth.     Coordinate sipping breath with counting (1-2x/day):  Breathe in through rounded lips with tongue behind lower teeth or touching at the bump behind upper teeth:  Breathe in through rounded lips, then speak \"1\"  Breathe in through rounded lips, then speak  \"1-2\"  Breathe in through rounded lips, then speak  \"1-2-3\"  Breathe in through rounded lips, then speak  \"1-2-3-4\"... up to 5, and then keep going up to 10     Continue: 2-3 times per day   - Lunges (5 times): inhale as you go down (rounded lips), shhh as you exhale   - Treadmill (5-10 minutes): try an incline of 2 and walking starting at 1.8 mph and increase speed to challenge yourself     Tips:   - Shift from nasal to rounded lip breathing as you move faster   - Breathing in through rounded lips or the nose will help and keep the vocal folds apart as much as possible to avoid a noise.    - When you start an activity, try inhale and exhale using rounded lips.      Try walking around a lake or hiking with low hills and see how you manage your breathing in through rounded lips and out with a pursed lip blow (like blowing out a candle).     Difficulty breathing when waking:   - Check humidity - try a humidifier at night (I like the [employer] brand)   - Take a couple breaths before you move out of bed. Use the strategy of inhale and shhh as you push your way out of bed.    - Also, check to make sure that reflux is not a component.  One pillow behind the upper back and two for the head to elevate the body.       Thank you and have a great day!  Ayana Piña" Grace Jay (voice), M.S., CCC-SLP  Speech-Language Pathologist  [facility]  [phone]  [email or web address]  Pronouns: she/her/hers

## 2024-09-30 NOTE — PROGRESS NOTES
[facility] VOICE CLINIC  PROGRESS REPORT (CPT 81384)    Patient: Leonid Mcmillan  Date of Service: 9/30/2024  Date of Last Service: 9/13/24  Number of Visits: 3/10  Certification Dates: 9/3/24 to 12/2/24  Referring Physician: Dr. Tessy Dale and Dr. Morales  His wife served as  today.     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  Perceptually, his voice is acceptable in terms of quality and loudness, and he has no complaints. Today, his concern is that his he is having increased difficulties breathing. He reported that he has had increased difficulties breathing since his right vocal fold augmentation last October. However, during today's laryngeal exam, we found that he now demonstrates a reduction in the mobility of his left vocal fold compared with his last exam in October of 2023.  After today's follow-up evaluation we proceeded with therapy to work on improving his breathing issues, this was helpful and I also provided additional counseling regarding today's findings of a reduced left vocal fold mobility in conjunction with the ongoing reduction of the right vocal fold mobility.    SUBJECTIVE:  Since Leonid's last session, he reports the following:   He feels like his breathing is a little bit better, particularly with physical activity.  He has been using his strategies.   His breathing is still difficult for a brief moment when getting out of bed in the morning. He also has trouble laying on his back in bed, but breathes better while on his side.   His breathing is fine at work.     OBJECTIVE:  Patient Specific Goal Metrics:      9/30/2024     9:00 AM   Dysponia SLP Goals   How would you rate your breathing, if 0 is the worst and 10 is the  "best? 7   How much does your breathing problem bother you?         A little bit     THERAPEUTIC ACTIVITIES  Today Leonid participated in the following therapeutic activities:  Counseling and Education:  Asked questions about the nature of his symptoms, and I answered all of these thoroughly.    I provided Leonid with explanation and skilled instruction of:  Education and exercises to promote optimal respiratory mechanics were provided:  Explanation of the anatomy and physiology of respiration for phonation; he found this to be helpful  He demonstrated excessive upper thoracic engagement during inhalation  Difficulty allowing abdominal relaxation for inhalation, and audible inhalation prior to instruction  Targeted practice of optimal breathing mechanics with patient positioned in sitting and a forward leaning position  With clinician support, patient was able to demonstrate improved abdominal relaxation and engagement on inhalation  Optimal exhalation using inward engagement of the abdominal wall with no corresponding collapse of the upper chest cavity was trained using the sustained \"sh\" task  Leonid demonstrated adequate accuracy with moderate to maximal clinician support, including verbal explanation, visual demonstration, and tactile reinforcement to facilitate awareness of low respiratory engagement.   Introduction to inhalation portion of several relaxed throat breathing techniques, including tongue tuck, straw sip, sniff, and puppy yawn.   Visual images were created for him to use during home practice, along with verbal description of the techniques as provider was masked. Patient's wife was subsequently able to demonstrate with excellent accy and aided in getting Leonid to produce a lower pitch, quieter inhalation vs his habitual high pitched, constricted inhalation.   Negative practice was employed and he endorsed feeling the lower pitch/quieter version felt like a better breath.   He demonstrated overall adequate accy " and will require additional reinforcement.     Instruction of Home Practice:  A revised regimen for home practice was instructed.  I provided an AVS of important notes of today's therapeutic activities to facilitate practice.    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  He will continue to work on his exercises on a daily basis, and work on incorporating the techniques into his daily activities. Speech therapy continues to be medically necessary for Leonid to participate fully and engage in activities of daily living.     Plan:   I will see Leonid in 1.5 weeks and will plan to review respiratory mechanics and instruct remaining RTB techniques, including straw breath. Add laryngeal release.   For home practice goals, see AVS.     TOTAL SERVICE TIME: 55 minutes  TREATMENT ([id number])    Grace Goldsmith (voice) MRodolfoS., CCC-SLP  Speech-Language Pathologist  [facility]  [phone]  [email or web address]  Pronouns: she/her/hers      *this report was created in part through the use of computerized dictation software, and though reviewed following completion, some typographic errors may persist.  If there is confusion regarding any of this notes contents, please contact me for clarification    OBJECTIVE FINDINGS  PATIENT REPORTED MEASURES  Patient Supplied Answers To Last 2 VHI Questionnaires      10/26/2023     8:15 AM 9/3/2024     8:42 AM   Voice Handicap Index (VHI-10)   My voice makes it difficult for people to hear me 2 2   People have difficulty understanding me in a noisy room 4 2   My voice difficulties restrict my personal and social life.  3 2   I feel left out of conversations because of my voice 2 2   My voice problem causes me to lose income 1 1   I  "feel as though I have to strain to produce voice 1 2   The clarity of my voice is unpredictable 2 2   My voice problem upsets me 4 2   My voice makes me feel handicapped 3 2   People ask, \"What's wrong with your voice?\" 2 2   VHI-10 24 19          Patient Supplied Answers To Last 2 CSI Questionnaires      10/26/2023     8:18 AM   Cough Severity Index (CSI)   My cough is worse when I lie down 0   My coughing problem causes me to restrict my personal and social life 0   I tend to avoid places because of my cough problem 0   I feel embarrassed because of my coughing problem 0   People ask, ''What's wrong?'' because I cough a lot 0   I run out of air when I cough 0   My coughing problem affects my voice 0   My coughing problem limits my physical activity 0   My coughing problem upsets me 0   People ask me if I am sick because I cough a lot 0   CSI Score 0          Patient Supplied Answers To Last 2 EAT Questionnaires      10/26/2023     8:16 AM   Eating Assessment Tool (EAT-10)   My swallowing problem has caused me to lose weight 0   My swallowing problem interferes with my ability to go out for meals 0   Swallowing liquids takes extra effort 2   Swallowing solids takes extra effort 0   Swallowing pills takes extra effort 0   Swallowing is painful 0   The pleasure of eating is affected by my swallowing 0   When I swallow food sticks in my throat 0   I cough when I eat 0   Swallowing is stressful 0   EAT-10 2         Patient Supplied Answers to Dyspnea Index Questionnaire:      9/13/2024     8:59 AM   Dyspnea Index   1. I have trouble getting air in. 2   2. I feel tightness in my throat when I am having cortney breathing problem. 2   3. It takes more effort to breathe than it used to. 2   4. Change in weather affect my breathing problem. 0   5. My breathing gets worse with stress. 0   6. I make sound/noise breathing in 2   7. I have to strain to breathe. 1   8. My shortness of breath gets worse with exercise or physical " activity 2   9. My breathing problem makes me feel stressed. 3   10. My breathing problem casuses me to restrict my personal and social life. 3   Dyspnea Index Total Score 17

## 2024-10-05 ENCOUNTER — ANCILLARY PROCEDURE (OUTPATIENT)
Dept: MRI IMAGING | Facility: CLINIC | Age: 39
End: 2024-10-05
Attending: OTOLARYNGOLOGY
Payer: COMMERCIAL

## 2024-10-05 DIAGNOSIS — J38.00 VOCAL CORD PARALYSIS: ICD-10-CM

## 2024-10-05 PROCEDURE — A9585 GADOBUTROL INJECTION: HCPCS | Performed by: STUDENT IN AN ORGANIZED HEALTH CARE EDUCATION/TRAINING PROGRAM

## 2024-10-05 PROCEDURE — 70543 MRI ORBT/FAC/NCK W/O &W/DYE: CPT | Performed by: STUDENT IN AN ORGANIZED HEALTH CARE EDUCATION/TRAINING PROGRAM

## 2024-10-05 RX ORDER — GADOBUTROL 604.72 MG/ML
7.5 INJECTION INTRAVENOUS ONCE
Status: COMPLETED | OUTPATIENT
Start: 2024-10-05 | End: 2024-10-05

## 2024-10-05 RX ADMIN — GADOBUTROL 6.5 ML: 604.72 INJECTION INTRAVENOUS at 11:04

## 2024-10-09 ENCOUNTER — VIRTUAL VISIT (OUTPATIENT)
Dept: OTOLARYNGOLOGY | Facility: CLINIC | Age: 39
End: 2024-10-09
Payer: COMMERCIAL

## 2024-10-09 DIAGNOSIS — J38.00 VOCAL CORD PARALYSIS: ICD-10-CM

## 2024-10-09 DIAGNOSIS — R49.0 DYSPHONIA: Primary | ICD-10-CM

## 2024-10-09 PROCEDURE — 92507 TX SP LANG VOICE COMM INDIV: CPT | Mod: GN | Performed by: SPEECH-LANGUAGE PATHOLOGIST

## 2024-10-09 NOTE — PROGRESS NOTES
Leonid Mcmillan is a 39 year old male who is being evaluated via a billable video visit.      Leonid has been notified and verbally consented to the following:   This video visit will be conducted between you and your provider.  Patient has opted to conduct today's video visit vs an in-person appointment.   Video visits are billed at different rates depending on your insurance coverage. Please reach out to your insurance provider with any questions.   If during the course of the call the provider feels the appointment is not appropriate, you will not be charged for this service.  Provider has received verbal consent for billable virtual visit from the patient? Yes  Will anyone else be joining your video visit? Patient's significant other    Call initiated at: 1000   Type of Visit Platform Used: Mobile[employer] Video  Location of provider: Home  Location of patient: [facility]  PROGRESS REPORT (CPT 89794)    Patient: Leonid Mcmillan  Date of Service: 10/9/2024  Date of Last Service: 9/30/24  Number of Visits: 4/10  Certification Dates: 9/3/24 to 12/2/24  Referring Physician: Dr. Tessy Dale and Dr. Morales  Patient was offered a , but declined as he wishes for his wife to translate for him.    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  Perceptually, his voice is acceptable in terms of quality and loudness, and he has no complaints. Today, his concern is that his he is having increased difficulties breathing. He reported that he has had increased difficulties breathing since his right vocal fold augmentation last October. However, during today's laryngeal exam, we found that he now demonstrates a reduction in the mobility of his left vocal fold  "compared with his last exam in October of 2023.  After today's follow-up evaluation we proceeded with therapy to work on improving his breathing issues, this was helpful and I also provided additional counseling regarding today's findings of a reduced left vocal fold mobility in conjunction with the ongoing reduction of the right vocal fold mobility.    SUBJECTIVE:  Since Leonid's last session, he reports the following:   His breathing is generally okay, but he still sometimes feels like his throat locks up or freezes in the morning or when eating spicy foods.   He thinks the breathing strategies help a little bit, but not enough.   He hasn't tried the rescue breathing when having difficulty because his throat is frozen and he feels like he can't do the breaths.     OBJECTIVE:  Patient Specific Goal Metrics:      9/30/2024     9:00 AM 10/9/2024    10:00 AM   Dysponia SLP Goals   How would you rate your breathing, if 0 is the worst and 10 is the best? 7 7   How much does your breathing problem bother you?         A little bit Somewhat       THERAPEUTIC ACTIVITIES  Today Leonid participated in the following therapeutic activities:  Counseling and Education:  Asked questions about the nature of his symptoms, and I answered all of these thoroughly.    I provided Leonid with explanation and skilled instruction of:  Rescue breathing strategies using oral configurations to promote improved vocal fold abduction were instructed  Patient reported the \"Shandra\" was most beneficial  Patient was able to demonstrate use of these techniques in combination with low respiratory engagement with good accuracy and moderate clinician support  Recommendation to utilize these strategies when getting out of bed in the morning or between 2-3 bites of spicy food, to try to mitigate throat \"freezing\" symptoms.  A plan for when and how to implement these strategies was developed, and the patient was encouraged to practice the techniques independent of " distress at least twice daily to habituate their use.     Instruction of Home Practice:  A revised regimen for home practice was instructed.  I provided an AVS of important notes of today's therapeutic activities to facilitate practice.    ASSESSMENT/PLAN  Progress toward long-term goals:  Adequate but incomplete progress; please see above    Impressions:  IMPRESSIONS: dysphonia R49.0 secondary to R vocal fold paralysis J38.01   Leonid had a productive session of therapy today, working on additional laryngeal aBductory maneuvers.  He will continue to work on his exercises on a daily basis, and work on incorporating the techniques into his daily activities. Speech therapy continues to be medically necessary for Leonid to participate fully and engage in activities of daily living.     Plan:   I will see Leonid in 2 weeks and will plan to review Shandra. Add laryngeal release and throat clear avoidance. Likely discharge as wife is going to have baby and he won't be able to attend, but remind that he can return in the future.     For home practice goals, see AVS.     TOTAL SERVICE TIME: 45 minutes  TREATMENT (89475)    Grace Goldsmith (voice), M.S., CCC-SLP  Speech-Language Pathologist  [facility]  [phone]  [email or web address]  Pronouns: she/her/hers      *this report was created in part through the use of computerized dictation software, and though reviewed following completion, some typographic errors may persist.  If there is confusion regarding any of this notes contents, please contact me for clarification    OBJECTIVE FINDINGS  PATIENT REPORTED MEASURES  Patient Supplied Answers To Last 2 VHI Questionnaires      9/3/2024     8:42 AM 10/9/2024     9:59 AM   Voice Handicap Index (VHI-10)   My voice makes it difficult for people to hear me 2 2   People have difficulty understanding me in a noisy room 2 3   My voice difficulties restrict my personal and social life.  2 3   I feel left out of  "conversations because of my voice 2 3   My voice problem causes me to lose income 1 3   I feel as though I have to strain to produce voice 2 2   The clarity of my voice is unpredictable 2 2   My voice problem upsets me 2 4   My voice makes me feel handicapped 2 4   People ask, \"What's wrong with your voice?\" 2 3   VHI-10 19 29          Patient Supplied Answers To Last 2 CSI Questionnaires      10/26/2023     8:18 AM   Cough Severity Index (CSI)   My cough is worse when I lie down 0   My coughing problem causes me to restrict my personal and social life 0   I tend to avoid places because of my cough problem 0   I feel embarrassed because of my coughing problem 0   People ask, ''What's wrong?'' because I cough a lot 0   I run out of air when I cough 0   My coughing problem affects my voice 0   My coughing problem limits my physical activity 0   My coughing problem upsets me 0   People ask me if I am sick because I cough a lot 0   CSI Score 0          Patient Supplied Answers To Last 2 EAT Questionnaires      10/26/2023     8:16 AM   Eating Assessment Tool (EAT-10)   My swallowing problem has caused me to lose weight 0   My swallowing problem interferes with my ability to go out for meals 0   Swallowing liquids takes extra effort 2   Swallowing solids takes extra effort 0   Swallowing pills takes extra effort 0   Swallowing is painful 0   The pleasure of eating is affected by my swallowing 0   When I swallow food sticks in my throat 0   I cough when I eat 0   Swallowing is stressful 0   EAT-10 2         Patient Supplied Answers to Dyspnea Index Questionnaire:      9/13/2024     8:59 AM   Dyspnea Index   1. I have trouble getting air in. 2   2. I feel tightness in my throat when I am having cortney breathing problem. 2   3. It takes more effort to breathe than it used to. 2   4. Change in weather affect my breathing problem. 0   5. My breathing gets worse with stress. 0   6. I make sound/noise breathing in 2   7. I have to " strain to breathe. 1   8. My shortness of breath gets worse with exercise or physical activity 2   9. My breathing problem makes me feel stressed. 3   10. My breathing problem casuses me to restrict my personal and social life. 3   Dyspnea Index Total Score 17

## 2024-10-09 NOTE — PATIENT INSTRUCTIONS
"Hello!     It was a pleasure to see you today. Please complete the exercises below and remember, a few minutes of practice many times throughout the day is more important than one large practice session. If you have any questions about your strategies, feel free to contact me at [email or web address] or via [employer]. Please call [phone] for scheduling alterations or to be seen sooner in case of cancellations.      When your throat freezes, use your \"ice cream cone\" breath IN, and \"fist balloon\" OUT. Repeat several times until your throat unfreezes.   Do this as you wake up in the morning  Do this any time you're eating spicy food- between every 2-3 bites.       Home Exercise Program:  BELLY BREATHING  Stretch: (each morning)  Stretch your arms up and take two slow, deep breaths, feeling your rib cage lift and stretch.    Now lean toward each side, taking 2 slow, deep breaths on each side, feeling your ribcage lift and expand along the sides.   Now leaning back, arching your back to feel a stretch along the front of your ribs, and take 2 more slow, deep breaths. Really let the abdomen expand fully.  Lastly, lean forward so that your head, neck, and arms are completely slack toward the ground. Hold for 30 seconds and allow your back and neck to stretch and take several slow, deep breaths. You should be able to feel a bit of expansion between your spine and shoulder blades as you inhale, then feel the shoulder blades pull back inward on exhalation.       Practice: (5 breaths per hour or 10 breaths at each meal)  Sit hinged forward with your elbows on your knees. OR Sit/stand normally   Decide whether you prefer to place your hands on either side of your belly and back or low ribs, or one of each.  If someone is nearby to help, they can place their hands on the sides of your back and ribs.   Slowly breathe in and feel your belly and back expand, like filling a balloon, pushing out against your " "waistband  Slowly breathe out (on \"Shhhhhh\") and feel your belly and back crunch inward, like zipping tight pants  Repeat slowly and take breaks if you get dizzy     HELPFUL HINTS:  -- Tie a string or piece of elastic around your low ribs when you get dressed. You'll be able to feel yourself expand against this as you breathe all day.   -- Some people find it easiest to practice while laying on their back or in a recliner, hands on their belly.  -- You can also roll onto your stomach, resting your arms above your head, in order to feel more back expansion.   ____________________________________________________     RESCUE BREATHS (4-5 breaths, 2-3 sets/day and IF SHORT OF BREATH)  -- Use a mirror if it helps to see your mouth and lips  -- Remember to focus on SILENT inhalation every time  -- Practice these daily when you're relaxed and focus on correct technique. The more you practice, the easier these become when you are not relaxed.  JESI or \"Hand Breath\": Place an ice-cream cone shaped hand against open, rounded lips with your tongue flat in your mouth. Slowly inhale and feel a silent, low breath sweep in and down your throat. Now tighten your hand to a fist and gently blow out, feeling a ballooning stretch through your cheeks and throat. Avoid blowing so hard that you feel strained.   TONGUE TUCK: Place the tip of your tongue against the roof of your mouth, creating a column in the center. Breathe in and feel cool & silent air sweep in along the sides of your tongue and down the back of your throat. Now keep your tongue tip in place and simply exhale.   This strategy will make your mouth dry, so stop and move some saliva around with your tongue whenever needed, then return to your strategy.   The tongue tuck is easiest to learn and start applying to movement, but doesn't open the vocal folds as forcefully as some other strategies.   STRAW SIP & SH SH SH SH SH SH : Round your lips like a milkshake straw and " "inhale, feeling cool & SILENT air sweep through the center of your mouth. Exhale with pursed lips and BALLOONED or bullfrogged cheeks and throat, or on a \"shhhh\", \"sh sh sh sh sh...\", or \"ffffff\", whichever feels better.   SNIFF (Sniff) & SH SH SH SH SH SH: Decide whether you prefer one long, gentle, SILENT sniff in through your nose or 2-3 short, quick sniffs. Exhale with pursed lips and ballooned or bullfrogged cheeks and throat, or on a \"shhhh\", \"sh sh sh sh sh...\", or \"ffffff\", whichever feels better. Blow more gently and slower if you feel dizzy.  PUPPY YAWN & SH SH SH SH SH SH: Let your tongue relax flat against your bottom lip like a puppy, keeping your lips and jaw open. SILENTLY inhale with a cool, yawny sensation. Next, exhale with pursed lips and ballooned or bullfrogged cheeks and throat, or on a \"shhhh\", \"sh sh sh sh sh...\", or \"ffffff\", whichever feels better. Blow more gently and slower if you feel dizzy.  TACO TONGUE & BULLFROG: If you can, curl your tongue slightly like a hard taco shell, keeping your lips and jaw open. Silently inhale with a cool, yawny sensation. Next, exhale with pursed lips and ballooned or bullfrogged cheeks and throat, or on a \"shhhh\", \"sh sh sh sh sh...\", or \"ffffff\", whichever feels better.  Blow more gently and slower if you feel dizzy.       ____________________________________________________     From your previous session with Dr. Evans:   3x: In through the nose and out through the mouth.     Coordinate sipping breath with counting (1-2x/day):  Breathe in through rounded lips with tongue behind lower teeth or touching at the bump behind upper teeth:  Breathe in through rounded lips, then speak \"1\"  Breathe in through rounded lips, then speak  \"1-2\"  Breathe in through rounded lips, then speak  \"1-2-3\"  Breathe in through rounded lips, then speak  \"1-2-3-4\"... up to 5, and then keep going up to 10     Continue: 2-3 times per day   - Lunges (5 times): inhale as you go " down (rounded lips), shhh as you exhale   - Treadmill (5-10 minutes): try an incline of 2 and walking starting at 1.8 mph and increase speed to challenge yourself     Tips:   - Shift from nasal to rounded lip breathing as you move faster   - Breathing in through rounded lips or the nose will help and keep the vocal folds apart as much as possible to avoid a noise.    - When you start an activity, try inhale and exhale using rounded lips.      Try walking around a lake or hiking with low hills and see how you manage your breathing in through rounded lips and out with a pursed lip blow (like blowing out a candle).     Difficulty breathing when waking:   - Check humidity - try a humidifier at night (I like the [employer] brand)   - Take a couple breaths before you move out of bed. Use the strategy of inhale and shhh as you push your way out of bed.    - Also, check to make sure that reflux is not a component.  One pillow behind the upper back and two for the head to elevate the body.         Thank you and have a great day!  Warren Ceron. (voice), M.S., CCC-SLP  Speech-Language Pathologist  [facility] Voice [facility]  [phone]  [email or web address]  Pronouns: she/her/hers

## 2024-10-09 NOTE — LETTER
10/9/2024       RE: Leonid Mcmillan  [address] [place] [address]     Dear Colleague,    Thank you for referring your patient, Leonid Mmcillan, to the [facility] VOICE CLINIC [place] at [facility]. Please see a copy of my visit note below.    Leonid Mcmillan is a 39 year old male who is being evaluated via a billable video visit.      Leonid has been notified and verbally consented to the following:   This video visit will be conducted between you and your provider.  Patient has opted to conduct today's video visit vs an in-person appointment.   Video visits are billed at different rates depending on your insurance coverage. Please reach out to your insurance provider with any questions.   If during the course of the call the provider feels the appointment is not appropriate, you will not be charged for this service.  Provider has received verbal consent for billable virtual visit from the patient? Yes  Will anyone else be joining your video visit? Patient's significant other    Call initiated at: 1000   Type of Visit Platform Used: KDS  Location of provider: Home  Location of patient: [facility] VOICE CLINIC  PROGRESS REPORT (CPT 75094)    Patient: Leonid Mcmillan  Date of Service: 10/9/2024  Date of Last Service: 9/30/24  Number of Visits: 4/10  Certification Dates: 9/3/24 to 12/2/24  Referring Physician: Dr. Tessy Dale and Dr. Morales  Patient was offered a , but declined as he wishes for his wife to translate for him.    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  Perceptually, his voice is acceptable in terms of quality and loudness, and he has no complaints. Today, his concern is that his  "he is having increased difficulties breathing. He reported that he has had increased difficulties breathing since his right vocal fold augmentation last October. However, during today's laryngeal exam, we found that he now demonstrates a reduction in the mobility of his left vocal fold compared with his last exam in October of 2023.  After today's follow-up evaluation we proceeded with therapy to work on improving his breathing issues, this was helpful and I also provided additional counseling regarding today's findings of a reduced left vocal fold mobility in conjunction with the ongoing reduction of the right vocal fold mobility.    SUBJECTIVE:  Since Leonid's last session, he reports the following:   His breathing is generally okay, but he still sometimes feels like his throat locks up or freezes in the morning or when eating spicy foods.   He thinks the breathing strategies help a little bit, but not enough.   He hasn't tried the rescue breathing when having difficulty because his throat is frozen and he feels like he can't do the breaths.     OBJECTIVE:  Patient Specific Goal Metrics:      9/30/2024     9:00 AM 10/9/2024    10:00 AM   Dysponia SLP Goals   How would you rate your breathing, if 0 is the worst and 10 is the best? 7 7   How much does your breathing problem bother you?         A little bit Somewhat       THERAPEUTIC ACTIVITIES  Today Leonid participated in the following therapeutic activities:  Counseling and Education:  Asked questions about the nature of his symptoms, and I answered all of these thoroughly.    I provided Leonid with explanation and skilled instruction of:  Rescue breathing strategies using oral configurations to promote improved vocal fold abduction were instructed  Patient reported the \"Debary\" was most beneficial  Patient was able to demonstrate use of these techniques in combination with low respiratory engagement with good accuracy and moderate clinician support  Recommendation to " "utilize these strategies when getting out of bed in the morning or between 2-3 bites of spicy food, to try to mitigate throat \"freezing\" symptoms.  A plan for when and how to implement these strategies was developed, and the patient was encouraged to practice the techniques independent of distress at least twice daily to habituate their use.     Instruction of Home Practice:  A revised regimen for home practice was instructed.  I provided an AVS of important notes of today's therapeutic activities to facilitate practice.    ASSESSMENT/PLAN  Progress toward long-term goals:  Adequate but incomplete progress; please see above    Impressions:  IMPRESSIONS: dysphonia R49.0 secondary to R vocal fold paralysis J38.01   Leonid had a productive session of therapy today, working on additional laryngeal aBductory maneuvers.  He will continue to work on his exercises on a daily basis, and work on incorporating the techniques into his daily activities. Speech therapy continues to be medically necessary for Leonid to participate fully and engage in activities of daily living.     Plan:   I will see Leonid in 2 weeks and will plan to review Shandra. Add laryngeal release and throat clear avoidance. Likely discharge as wife is going to have baby and he won't be able to attend, but remind that he can return in the future.     For home practice goals, see AVS.     TOTAL SERVICE TIME: 45 minutes  TREATMENT ([id number])    Grace Goldsmith (voice), M.S., CCC-SLP  Speech-Language Pathologist  [facility]  [phone]  [email or web address]  Pronouns: she/her/hers      *this report was created in part through the use of computerized dictation software, and though reviewed following completion, some typographic errors may persist.  If there is confusion regarding any of this notes contents, please contact me for clarification    OBJECTIVE FINDINGS  PATIENT REPORTED MEASURES  Patient Supplied Answers To Last 2 VHI Questionnaires      " "9/3/2024     8:42 AM 10/9/2024     9:59 AM   Voice Handicap Index (VHI-10)   My voice makes it difficult for people to hear me 2 2   People have difficulty understanding me in a noisy room 2 3   My voice difficulties restrict my personal and social life.  2 3   I feel left out of conversations because of my voice 2 3   My voice problem causes me to lose income 1 3   I feel as though I have to strain to produce voice 2 2   The clarity of my voice is unpredictable 2 2   My voice problem upsets me 2 4   My voice makes me feel handicapped 2 4   People ask, \"What's wrong with your voice?\" 2 3   VHI-10 19 29          Patient Supplied Answers To Last 2 CSI Questionnaires      10/26/2023     8:18 AM   Cough Severity Index (CSI)   My cough is worse when I lie down 0   My coughing problem causes me to restrict my personal and social life 0   I tend to avoid places because of my cough problem 0   I feel embarrassed because of my coughing problem 0   People ask, ''What's wrong?'' because I cough a lot 0   I run out of air when I cough 0   My coughing problem affects my voice 0   My coughing problem limits my physical activity 0   My coughing problem upsets me 0   People ask me if I am sick because I cough a lot 0   CSI Score 0          Patient Supplied Answers To Last 2 EAT Questionnaires      10/26/2023     8:16 AM   Eating Assessment Tool (EAT-10)   My swallowing problem has caused me to lose weight 0   My swallowing problem interferes with my ability to go out for meals 0   Swallowing liquids takes extra effort 2   Swallowing solids takes extra effort 0   Swallowing pills takes extra effort 0   Swallowing is painful 0   The pleasure of eating is affected by my swallowing 0   When I swallow food sticks in my throat 0   I cough when I eat 0   Swallowing is stressful 0   EAT-10 2         Patient Supplied Answers to Dyspnea Index Questionnaire:      9/13/2024     8:59 AM   Dyspnea Index   1. I have trouble getting air in. 2   2. " I feel tightness in my throat when I am having cortney breathing problem. 2   3. It takes more effort to breathe than it used to. 2   4. Change in weather affect my breathing problem. 0   5. My breathing gets worse with stress. 0   6. I make sound/noise breathing in 2   7. I have to strain to breathe. 1   8. My shortness of breath gets worse with exercise or physical activity 2   9. My breathing problem makes me feel stressed. 3   10. My breathing problem casuses me to restrict my personal and social life. 3   Dyspnea Index Total Score 17          Again, thank you for allowing me to participate in the care of your patient.      Sincerely,    Ayana Jay, SLP

## 2024-10-22 ENCOUNTER — VIRTUAL VISIT (OUTPATIENT)
Dept: OTOLARYNGOLOGY | Facility: CLINIC | Age: 39
End: 2024-10-22
Payer: COMMERCIAL

## 2024-10-22 DIAGNOSIS — J38.01 VOCAL FOLD PARALYSIS, RIGHT: ICD-10-CM

## 2024-10-22 DIAGNOSIS — R49.0 DYSPHONIA: Primary | ICD-10-CM

## 2024-10-22 PROCEDURE — 92507 TX SP LANG VOICE COMM INDIV: CPT | Mod: GN | Performed by: SPEECH-LANGUAGE PATHOLOGIST

## 2024-10-22 NOTE — LETTER
10/22/2024       RE: Leonid Mcmillan  [address] [place] [address]     Dear Colleague,    Thank you for referring your patient, Leonid Mcmillan, to the [facility] VOICE CLINIC [place] at [facility]. Please see a copy of my visit note below.    Leonid Mcmillan is a 39 year old male who is being evaluated via a billable video visit.      Leonid has been notified and verbally consented to the following:   This video visit will be conducted between you and your provider.  Patient has opted to conduct today's video visit vs an in-person appointment.   Video visits are billed at different rates depending on your insurance coverage. Please reach out to your insurance provider with any questions.   If during the course of the call the provider feels the appointment is not appropriate, you will not be charged for this service.  Provider has received verbal consent for billable virtual visit from the patient? Yes  Will anyone else be joining your video visit? Patient's significant other    Call initiated at: 0900   Type of Visit Platform Used: [employer]for[employer] Video  Location of provider: Home  Location of patient: [facility] VOICE CLINIC  PROGRESS REPORT (CPT 43582)    Patient: Leonid Mcmillan  Date of Service: 10/22/2024  Date of Last Service: 10/9/24  Number of Visits: 5/10  Certification Dates: 9/3/24 to 12/2/24  Referring Physician: Dr. Tessy Dale and Dr. Morales  Patient was offered a , but declined as he wishes for his wife to translate for him.    Impressions from evaluation on 9/3/24 by TORRES Donohue.STEVEN, M.M. 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  Perceptually, his voice is acceptable in terms of quality and loudness, and he has no complaints. Today, his concern is that his  he is having increased difficulties breathing. He reported that he has had increased difficulties breathing since his right vocal fold augmentation last October. However, during today's laryngeal exam, we found that he now demonstrates a reduction in the mobility of his left vocal fold compared with his last exam in October of 2023.  After today's follow-up evaluation we proceeded with therapy to work on improving his breathing issues, this was helpful and I also provided additional counseling regarding today's findings of a reduced left vocal fold mobility in conjunction with the ongoing reduction of the right vocal fold mobility.    SUBJECTIVE:  Since Leonid's last session, he reports the following:   He hasn't had a chance to test his breathing strategies.   He doesn't feel as much breathing difficulty when getting out of bed or eating spicy foods.     OBJECTIVE:  Patient Specific Goal Metrics:      9/30/2024     9:00 AM 10/9/2024    10:00 AM 10/22/2024     9:00 AM   Dysponia SLP Goals   How would you rate your breathing, if 0 is the worst and 10 is the best? 7 7 7   How much does your breathing problem bother you?         A little bit Somewhat Somewhat       THERAPEUTIC ACTIVITIES  Today Leonid participated in the following therapeutic activities:  Counseling and Education:  Asked questions about the nature of his symptoms, and I answered all of these thoroughly.  Concepts and techniques for using saline and plain-water gargling, nasal irrigation, gel saline spray, humidification, increased liquid intake, and steam to increase systemic and typical hydration and reduce post-nasal drainage and thickened secretions and laryngeal irritation.     I provided Leonid with explanation and skilled instruction of:  Laryngeal release technique targeting reduced laryngeal elevation and constriction and improved vocal fold aBduction was instructed  Patient was instructed through negative practice of laryngeal elevation with  "high-frequency, closed vowel productions and low-frequency, open vowel productions in an effort to develop somato-sensory awareness of the laryngeal musculature  This was combined with aBductory breathing maneuvers  Patient was instructed to utilize a silent, yawny, \"uh\"-shaped inhalation with application to all other therapy tasks  He demonstrated good accuracy following moderate clinician support  Combination of all breathing strategies together, with good accy and fading verbal/visual cues.     Instruction of Home Practice:  A revised regimen for home practice was instructed.  I provided an AVS of important notes of today's therapeutic activities to facilitate practice.    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  He will continue to work on his exercises on a daily basis, and work on incorporating the techniques into his daily activities. Speech therapy is no longer medically necessary at this time as all goals have been met and he is able to continue practicing with independent home activities, but he will contact our clinic should additional concerns arise in the future.     Plan:   Discharge.     For home practice goals, see AVS.     TOTAL SERVICE TIME: 45 minutes  TREATMENT ([id number])    Grace Goldsmith (voice) MRodolfoS., CCC-SLP  Speech-Language Pathologist  [facility]  [phone]  [email or web address]  Pronouns: she/her/hers      *this report was created in part through the use of computerized dictation software, and though reviewed following completion, some typographic errors may persist.  If there is confusion regarding any of this notes contents, please contact me for clarification    OBJECTIVE " "FINDINGS  PATIENT REPORTED MEASURES  Patient Supplied Answers To Last 2 VHI Questionnaires      9/3/2024     8:42 AM 10/9/2024     9:59 AM   Voice Handicap Index (VHI-10)   My voice makes it difficult for people to hear me 2 2   People have difficulty understanding me in a noisy room 2 3   My voice difficulties restrict my personal and social life.  2 3   I feel left out of conversations because of my voice 2 3   My voice problem causes me to lose income 1 3   I feel as though I have to strain to produce voice 2 2   The clarity of my voice is unpredictable 2 2   My voice problem upsets me 2 4   My voice makes me feel handicapped 2 4   People ask, \"What's wrong with your voice?\" 2 3   VHI-10 19 29          Patient Supplied Answers To Last 2 CSI Questionnaires      10/26/2023     8:18 AM   Cough Severity Index (CSI)   My cough is worse when I lie down 0   My coughing problem causes me to restrict my personal and social life 0   I tend to avoid places because of my cough problem 0   I feel embarrassed because of my coughing problem 0   People ask, ''What's wrong?'' because I cough a lot 0   I run out of air when I cough 0   My coughing problem affects my voice 0   My coughing problem limits my physical activity 0   My coughing problem upsets me 0   People ask me if I am sick because I cough a lot 0   CSI Score 0          Patient Supplied Answers To Last 2 EAT Questionnaires      10/26/2023     8:16 AM   Eating Assessment Tool (EAT-10)   My swallowing problem has caused me to lose weight 0   My swallowing problem interferes with my ability to go out for meals 0   Swallowing liquids takes extra effort 2   Swallowing solids takes extra effort 0   Swallowing pills takes extra effort 0   Swallowing is painful 0   The pleasure of eating is affected by my swallowing 0   When I swallow food sticks in my throat 0   I cough when I eat 0   Swallowing is stressful 0   EAT-10 2         Patient Supplied Answers to Dyspnea Index " Questionnaire:      10/22/2024     8:58 AM   Dyspnea Index   1. I have trouble getting air in. 2   2. I feel tightness in my throat when I am having cortney breathing problem. 2   3. It takes more effort to breathe than it used to. 2   4. Change in weather affect my breathing problem. 2   5. My breathing gets worse with stress. 2   6. I make sound/noise breathing in 3   7. I have to strain to breathe. 2   8. My shortness of breath gets worse with exercise or physical activity 3   9. My breathing problem makes me feel stressed. 3   10. My breathing problem casuses me to restrict my personal and social life. 3   Dyspnea Index Total Score 24          Again, thank you for allowing me to participate in the care of your patient.      Sincerely,    Ayana Jay, SLP

## 2024-10-22 NOTE — PATIENT INSTRUCTIONS
"Hello!     It was a pleasure to see you today. Please complete the exercises below and remember, a few minutes of practice many times throughout the day is more important than one large practice session. If you have any questions about your strategies, feel free to contact me at [email or web address] or via [employer]. Please call [phone] for scheduling alterations or to be seen sooner in case of cancellations.      When your throat freezes, use your \"ice cream cone\" breath IN, and \"fist balloon\" OUT. Repeat several times until your throat unfreezes.   Do this as you wake up in the morning  Do this any time you're eating spicy food- between every 2-3 bites.         Home Exercise Program:  LARYNGEAL RELEASE INHALATION    1. Place a couple fingers on the tip of your calixto's apple, along the front of your neck.   2. Swallow your spit and notice the calixto's apple moves upward and then back down. This upward movement coincides with the airway completely closing up tight to protect your lungs when you swallow.   3. Now do a quiet yawn and feel your calixto's apple move downward (and more OPEN).  4. Compare the movement of your calixto's apple when you do a high pitch EEE sound, a low sigh on [place], baby gorilla grunt \"huh huh huh huh huh\", or donkey noise \"ee uh ee uh ee uh\".  5. Practice inhaling with a Silent, Low, \"UH\" yawn. See if you can feel your calixto's apple dropping downward slightly. Eventually combine this low, yawny UH breath with your rescue breaths.     ____________________________________________________    BELLY BREATHING  Stretch: (each morning)  Stretch your arms up and take two slow, deep breaths, feeling your rib cage lift and stretch.    Now lean toward each side, taking 2 slow, deep breaths on each side, feeling your ribcage lift and expand along the sides.   Now leaning back, arching your back to feel a stretch along the front of your ribs, and take 2 more slow, deep breaths. Really let the abdomen " "expand fully.  Lastly, lean forward so that your head, neck, and arms are completely slack toward the ground. Hold for 30 seconds and allow your back and neck to stretch and take several slow, deep breaths. You should be able to feel a bit of expansion between your spine and shoulder blades as you inhale, then feel the shoulder blades pull back inward on exhalation.       Practice: (5 breaths per hour or 10 breaths at each meal)  Sit hinged forward with your elbows on your knees. OR Sit/stand normally   Decide whether you prefer to place your hands on either side of your belly and back or low ribs, or one of each.  If someone is nearby to help, they can place their hands on the sides of your back and ribs.   Slowly breathe in and feel your belly and back expand, like filling a balloon, pushing out against your waistband  Slowly breathe out (on \"Shhhhhh\") and feel your belly and back crunch inward, like zipping tight pants  Repeat slowly and take breaks if you get dizzy     HELPFUL HINTS:  -- Tie a string or piece of elastic around your low ribs when you get dressed. You'll be able to feel yourself expand against this as you breathe all day.   -- Some people find it easiest to practice while laying on their back or in a recliner, hands on their belly.  -- You can also roll onto your stomach, resting your arms above your head, in order to feel more back expansion.   ____________________________________________________     RESCUE BREATHS (4-5 breaths, 2-3 sets/day and IF SHORT OF BREATH)  -- Use a mirror if it helps to see your mouth and lips  -- Remember to focus on SILENT inhalation every time  -- Practice these daily when you're relaxed and focus on correct technique. The more you practice, the easier these become when you are not relaxed.  -- Combine with your low, yawny \"UH\" breath  JESI or \"Hand Breath\": Place an ice-cream cone shaped hand against open, rounded lips with your tongue flat in your mouth. Slowly " "inhale and feel a silent, low breath sweep in and down your throat. Now tighten your hand to a fist and gently blow out, feeling a ballooning stretch through your cheeks and throat. Avoid blowing so hard that you feel strained.   TONGUE TUCK: Place the tip of your tongue against the roof of your mouth, creating a column in the center. Breathe in and feel cool & silent air sweep in along the sides of your tongue and down the back of your throat. Now keep your tongue tip in place and simply exhale.   This strategy will make your mouth dry, so stop and move some saliva around with your tongue whenever needed, then return to your strategy.   The tongue tuck is easiest to learn and start applying to movement, but doesn't open the vocal folds as forcefully as some other strategies.   STRAW SIP & BALLOON : Round your lips like a milkshake straw and inhale, feeling cool & SILENT air sweep through the center of your mouth. Exhale with pursed lips and BALLOONED or bullfrogged cheeks and throat, or on a \"shhhh\", \"sh sh sh sh sh...\", or \"ffffff\", whichever feels better.   SNIFF (Sniff) & BALLOON: Decide whether you prefer one long, gentle, SILENT sniff in through your nose or 2-3 short, quick sniffs. Exhale with pursed lips and ballooned or bullfrogged cheeks and throat, or on a \"shhhh\", \"sh sh sh sh sh...\", or \"ffffff\", whichever feels better. Blow more gently and slower if you feel dizzy.  PUPPY YAWN & BALLOON: Let your tongue relax flat against your bottom lip like a puppy, keeping your lips and jaw open. SILENTLY inhale with a cool, yawny sensation. Next, exhale with pursed lips and ballooned or bullfrogged cheeks and throat, or on a \"shhhh\", \"sh sh sh sh sh...\", or \"ffffff\", whichever feels better. Blow more gently and slower if you feel dizzy.  TACO TONGUE & BULLFROG: If you can, curl your tongue slightly like a hard taco shell, keeping your lips and jaw open. Silently inhale with a cool, yawny sensation. Next, exhale " "with pursed lips and ballooned or bullfrogged cheeks and throat, or on a \"shhhh\", \"sh sh sh sh sh...\", or \"ffffff\", whichever feels better.  Blow more gently and slower if you feel dizzy.  ____________________________________________________     MUCUS MANAGEMENT   (ongoing as needed; eventually these should become \"tools\" that you put away in your \"toolbox\" for the next time they're needed)    Why do I have so much mucus?   Mucus is your body's way of cleaning out particles of dust, dirt, and allergens that you've breathed in. These particles get stuck in the mucus and it slowly drips out of your sinuses and down the back of your throat, where it is typically swallowed and removed from your body. Mucus is also produced to help protect your airway tissues from irritation or excessive dryness, particularly in the winter, preventing those tissues from drying and cracking (nosebleeds). If your body is dehydrated, even normal mucus amounts can thicken and get stickier, causing more coughing and throat clearing. If parts of your airway are irritated, you can also send mucus to those locations to try to \"coat and protect\".     Tools to Try:   Sinus rinse 2-3x/week (NeilMed Sinus Rinse bottle with warmed distilled water and saline mix; gently squeeze up one nostril as you lean forward over the sink, exhaling through your mouth on an \"hhhhhh\" as you squeeze.)  -- A more convenient option is to use a liquid saline spray. Do 4 sprays per nostril, wrinkling your nose to loosen any mucus or particles of allergens, then blow your nose and repeat on the other side. Repeat up to 4x/day.   Gargle ~2 oz warm water with 1/4 tsp mixture, each AM/PM (Mix a container of equal parts salt/baking soda and keep next to your toothbrush)(Do not try to gargle all 2 oz at once- you will likely choke. Take tiny sips, gargle and spit, then repeat, until you've worked your way through most of the 2 oz)  Drink about 62 ounces of " "water/juice/tea/milk, etc each day  Gel saline spray to be used AM and/or PM (Ayr No Drip Saline Gel [place]- easiest to find on Amazon)  Humidifier or bowl of warm water near your bed or in your office in the winter. Aim for about 45-55% humidity to prevent your mucosal tissues from over-drying during the winter. If you aren't sure what the humidity level is, you can purchase a hygrometer for about $10.  Steaming: hot showers, boil a pot of water and hold a towel over your head to make a steam tent, facial steamers, wring out a hot OR COLD damp washcloth and then hold it over your nose and mouth while breathing.   Guaifenesin: This is a mucus thinner and is the active ingredient in name brand Mucinex, but the generic is much cheaper. You can ask your pharmacist where they're cheapest option is located. Some people find that guaifenesin is really helpful and other people don't find that it does anything for them, so you can try it to see how your body responds.      ____________________________________________________     From your previous session with Dr. Evans:   3x: In through the nose and out through the mouth.     Coordinate sipping breath with counting (1-2x/day):  Breathe in through rounded lips with tongue behind lower teeth or touching at the bump behind upper teeth:  Breathe in through rounded lips, then speak \"1\"  Breathe in through rounded lips, then speak  \"1-2\"  Breathe in through rounded lips, then speak  \"1-2-3\"  Breathe in through rounded lips, then speak  \"1-2-3-4\"... up to 5, and then keep going up to 10     Continue: 2-3 times per day   - Lunges (5 times): inhale as you go down (rounded lips), shhh as you exhale   - Treadmill (5-10 minutes): try an incline of 2 and walking starting at 1.8 mph and increase speed to challenge yourself     Tips:   - Shift from nasal to rounded lip breathing as you move faster   - Breathing in through rounded lips or the nose will help and keep the vocal folds apart " as much as possible to avoid a noise.    - When you start an activity, try inhale and exhale using rounded lips.      Try walking around a lake or hiking with low hills and see how you manage your breathing in through rounded lips and out with a pursed lip blow (like blowing out a candle).     Difficulty breathing when waking:   - Check humidity - try a humidifier at night (I like the Crane brand)   - Take a couple breaths before you move out of bed. Use the strategy of inhale and shhh as you push your way out of bed.    - Also, check to make sure that reflux is not a component.  One pillow behind the upper back and two for the head to elevate the body.         Thank you and have a great day!  Warren Ceron. (voice) M.S., CCC-SLP  Speech-Language Pathologist  [facility]  [phone]  [email or web address]  Pronouns: she/her/hers

## 2024-10-22 NOTE — PROGRESS NOTES
Leonid Mcmillan is a 39 year old male who is being evaluated via a billable video visit.      Leonid has been notified and verbally consented to the following:   This video visit will be conducted between you and your provider.  Patient has opted to conduct today's video visit vs an in-person appointment.   Video visits are billed at different rates depending on your insurance coverage. Please reach out to your insurance provider with any questions.   If during the course of the call the provider feels the appointment is not appropriate, you will not be charged for this service.  Provider has received verbal consent for billable virtual visit from the patient? Yes  Will anyone else be joining your video visit? Patient's significant other    Call initiated at: 0900   Type of Visit Platform Used: [employer]  Location of provider: Home  Location of patient: [facility]  PROGRESS REPORT (CPT 66898)    Patient: Leonid Mcmillan  Date of Service: 10/22/2024  Date of Last Service: 10/9/24  Number of Visits: 5/10  Certification Dates: 9/3/24 to 12/2/24  Referring Physician: Dr. Tessy Dale and Dr. Morales  Patient was offered a , but declined as he wishes for his wife to translate for him.    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  Perceptually, his voice is acceptable in terms of quality and loudness, and he has no complaints. Today, his concern is that his he is having increased difficulties breathing. He reported that he has had increased difficulties breathing since his right vocal fold augmentation last October. However, during today's laryngeal exam, we found that he now demonstrates a reduction in the mobility of his left vocal fold  "compared with his last exam in October of 2023.  After today's follow-up evaluation we proceeded with therapy to work on improving his breathing issues, this was helpful and I also provided additional counseling regarding today's findings of a reduced left vocal fold mobility in conjunction with the ongoing reduction of the right vocal fold mobility.    SUBJECTIVE:  Since Leonid's last session, he reports the following:   He hasn't had a chance to test his breathing strategies.   He doesn't feel as much breathing difficulty when getting out of bed or eating spicy foods.     OBJECTIVE:  Patient Specific Goal Metrics:      9/30/2024     9:00 AM 10/9/2024    10:00 AM 10/22/2024     9:00 AM   Dysponia SLP Goals   How would you rate your breathing, if 0 is the worst and 10 is the best? 7 7 7   How much does your breathing problem bother you?         A little bit Somewhat Somewhat       THERAPEUTIC ACTIVITIES  Today Leonid participated in the following therapeutic activities:  Counseling and Education:  Asked questions about the nature of his symptoms, and I answered all of these thoroughly.  Concepts and techniques for using saline and plain-water gargling, nasal irrigation, gel saline spray, humidification, increased liquid intake, and steam to increase systemic and typical hydration and reduce post-nasal drainage and thickened secretions and laryngeal irritation.     I provided Leonid with explanation and skilled instruction of:  Laryngeal release technique targeting reduced laryngeal elevation and constriction and improved vocal fold aBduction was instructed  Patient was instructed through negative practice of laryngeal elevation with high-frequency, closed vowel productions and low-frequency, open vowel productions in an effort to develop somato-sensory awareness of the laryngeal musculature  This was combined with aBductory breathing maneuvers  Patient was instructed to utilize a silent, yawny, \"uh\"-shaped inhalation with " application to all other therapy tasks  He demonstrated good accuracy following moderate clinician support  Combination of all breathing strategies together, with good accy and fading verbal/visual cues.     Instruction of Home Practice:  A revised regimen for home practice was instructed.  I provided an AVS of important notes of today's therapeutic activities to facilitate practice.    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  He will continue to work on his exercises on a daily basis, and work on incorporating the techniques into his daily activities. Speech therapy is no longer medically necessary at this time as all goals have been met and he is able to continue practicing with independent home activities, but he will contact our clinic should additional concerns arise in the future.     Plan:   Discharge.     For home practice goals, see AVS.     TOTAL SERVICE TIME: 45 minutes  TREATMENT ([id number])    Grace Goldsmith (voice) M.S., CCC-SLP  Speech-Language Pathologist  [facility]  [phone]  [email or web address]  Pronouns: she/her/hers      *this report was created in part through the use of computerized dictation software, and though reviewed following completion, some typographic errors may persist.  If there is confusion regarding any of this notes contents, please contact me for clarification    OBJECTIVE FINDINGS  PATIENT REPORTED MEASURES  Patient Supplied Answers To Last 2 VHI Questionnaires      9/3/2024     8:42 AM 10/9/2024     9:59 AM   Voice Handicap Index (VHI-10)   My voice makes it difficult for people to hear me 2 2   People have difficulty understanding me in a noisy room 2 3   My voice  "difficulties restrict my personal and social life.  2 3   I feel left out of conversations because of my voice 2 3   My voice problem causes me to lose income 1 3   I feel as though I have to strain to produce voice 2 2   The clarity of my voice is unpredictable 2 2   My voice problem upsets me 2 4   My voice makes me feel handicapped 2 4   People ask, \"What's wrong with your voice?\" 2 3   VHI-10 19 29          Patient Supplied Answers To Last 2 CSI Questionnaires      10/26/2023     8:18 AM   Cough Severity Index (CSI)   My cough is worse when I lie down 0   My coughing problem causes me to restrict my personal and social life 0   I tend to avoid places because of my cough problem 0   I feel embarrassed because of my coughing problem 0   People ask, ''What's wrong?'' because I cough a lot 0   I run out of air when I cough 0   My coughing problem affects my voice 0   My coughing problem limits my physical activity 0   My coughing problem upsets me 0   People ask me if I am sick because I cough a lot 0   CSI Score 0          Patient Supplied Answers To Last 2 EAT Questionnaires      10/26/2023     8:16 AM   Eating Assessment Tool (EAT-10)   My swallowing problem has caused me to lose weight 0   My swallowing problem interferes with my ability to go out for meals 0   Swallowing liquids takes extra effort 2   Swallowing solids takes extra effort 0   Swallowing pills takes extra effort 0   Swallowing is painful 0   The pleasure of eating is affected by my swallowing 0   When I swallow food sticks in my throat 0   I cough when I eat 0   Swallowing is stressful 0   EAT-10 2         Patient Supplied Answers to Dyspnea Index Questionnaire:      10/22/2024     8:58 AM   Dyspnea Index   1. I have trouble getting air in. 2   2. I feel tightness in my throat when I am having cortney breathing problem. 2   3. It takes more effort to breathe than it used to. 2   4. Change in weather affect my breathing problem. 2   5. My breathing " gets worse with stress. 2   6. I make sound/noise breathing in 3   7. I have to strain to breathe. 2   8. My shortness of breath gets worse with exercise or physical activity 3   9. My breathing problem makes me feel stressed. 3   10. My breathing problem casuses me to restrict my personal and social life. 3   Dyspnea Index Total Score 24

## 2024-11-06 ENCOUNTER — TELEPHONE (OUTPATIENT)
Dept: ENDOCRINOLOGY | Facility: CLINIC | Age: 39
End: 2024-11-06

## 2024-11-07 ENCOUNTER — TELEPHONE (OUTPATIENT)
Dept: OTOLARYNGOLOGY | Facility: CLINIC | Age: 39
End: 2024-11-07
Payer: COMMERCIAL

## 2024-11-25 ENCOUNTER — LAB (OUTPATIENT)
Dept: LAB | Facility: CLINIC | Age: 39
End: 2024-11-25
Payer: COMMERCIAL

## 2024-11-25 DIAGNOSIS — C73 PAPILLARY THYROID CARCINOMA (H): ICD-10-CM

## 2024-11-25 DIAGNOSIS — E89.0 POSTOPERATIVE HYPOTHYROIDISM: ICD-10-CM

## 2024-11-25 LAB
T4 FREE SERPL-MCNC: 2.12 NG/DL (ref 0.9–1.7)
TSH SERPL DL<=0.005 MIU/L-ACNC: 0.04 UIU/ML (ref 0.3–4.2)

## 2024-11-25 PROCEDURE — 99000 SPECIMEN HANDLING OFFICE-LAB: CPT

## 2024-11-25 PROCEDURE — 36415 COLL VENOUS BLD VENIPUNCTURE: CPT

## 2024-11-25 PROCEDURE — 84439 ASSAY OF FREE THYROXINE: CPT

## 2024-11-25 PROCEDURE — 84443 ASSAY THYROID STIM HORMONE: CPT

## 2024-11-25 PROCEDURE — 86800 THYROGLOBULIN ANTIBODY: CPT | Mod: 90

## 2024-11-25 PROCEDURE — 84432 ASSAY OF THYROGLOBULIN: CPT | Mod: 90

## 2024-12-02 LAB — SCANNED LAB RESULT: NORMAL

## 2025-01-18 NOTE — TELEPHONE ENCOUNTER
----- Message from Antonio Morales sent at 11/6/2024  9:07 PM CST -----  Thanks Tessy Alvarez  ----- Message -----  From: Tessy Dale MD  Sent: 11/6/2024  12:28 PM CST  To: Maryjane Marie RN; Leslie Galvez MD; #    Hi Leslie  Good idea - I will bring him back to do a flexible bronchoscopy to look at the trachea side.     Maryjane - can you add him to a procedure slot please?    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  Have the images /tests we have done recently evaluated sufficiently for that, or does he need a direct look inside beyond what you have done?    Leslie Galvez  ----- Message -----  From: Tessy Dale MD  Sent: 11/6/2024  10:44 AM CST  To: Maryjane Marie RN; Leslie Galvez MD; #    Hi Everyone  We did obtain the MRI neck and nothing popped up on this to explain why he now has the new LEFT vocal fold paresis.     Tessy  ----- Message -----  From: Leslie Galvez MD  Sent: 9/3/2024  12:13 PM CST  To: Maryjane Em RN; Antonio Morales MD; #     I am ok with MRI neck.   Leslie Galvez  ----- Message -----  From: Tessy Dale MD  Sent: 9/3/2024   8:47 AM CDT  To: Maryjane Em RN; Leslie Galvez MD; #    Hi Leslie and Antonio  I met Leonid today for follow up on his voice and he tells me that his breathing is worse and indeed on exam he now has a LEFT vocal fold paresis that is new since his November 2023 exam when he had a RIGHT only vocal fold paralysis.    I see that there are recent scans (CT chest and US) that do not explain the new finding.    Are you opposed to a MRI neck ? Or would you recommend something else to work up his new vocal fold paresis?    He is symptomatic - with high activity - I have him in breathing therapy right now.    Tessy

## 2025-02-23 DIAGNOSIS — C73 PAPILLARY THYROID CARCINOMA (H): ICD-10-CM

## 2025-02-23 DIAGNOSIS — E89.0 POSTOPERATIVE HYPOTHYROIDISM: ICD-10-CM

## 2025-02-27 ENCOUNTER — MYC REFILL (OUTPATIENT)
Dept: ENDOCRINOLOGY | Facility: CLINIC | Age: 40
End: 2025-02-27
Payer: COMMERCIAL

## 2025-02-27 DIAGNOSIS — C73 PAPILLARY THYROID CARCINOMA (H): ICD-10-CM

## 2025-02-27 DIAGNOSIS — E89.0 POSTOPERATIVE HYPOTHYROIDISM: ICD-10-CM

## 2025-02-27 RX ORDER — LEVOTHYROXINE SODIUM 150 UG/1
150 TABLET ORAL DAILY
Qty: 90 TABLET | Refills: 4 | Status: SHIPPED | OUTPATIENT
Start: 2025-02-27

## 2025-02-27 RX ORDER — LEVOTHYROXINE SODIUM 150 UG/1
150 TABLET ORAL DAILY
Qty: 90 TABLET | Refills: 4 | Status: CANCELLED | OUTPATIENT
Start: 2025-02-27

## 2025-05-21 ENCOUNTER — PATIENT OUTREACH (OUTPATIENT)
Dept: CARE COORDINATION | Facility: CLINIC | Age: 40
End: 2025-05-21
Payer: COMMERCIAL

## 2025-05-27 ENCOUNTER — VIRTUAL VISIT (OUTPATIENT)
Dept: ENDOCRINOLOGY | Facility: CLINIC | Age: 40
End: 2025-05-27
Payer: COMMERCIAL

## 2025-05-27 DIAGNOSIS — J38.01 VOCAL FOLD PARALYSIS, RIGHT: ICD-10-CM

## 2025-05-27 DIAGNOSIS — C73 PAPILLARY THYROID CARCINOMA (H): Primary | ICD-10-CM

## 2025-05-27 DIAGNOSIS — E89.0 POSTOPERATIVE HYPOTHYROIDISM: ICD-10-CM

## 2025-05-27 NOTE — PATIENT INSTRUCTIONS
US neck in September 2025 -- this should be at [facility] ([phone] to schedule)     Labs 2-3 weeks before next appt in one year

## 2025-05-27 NOTE — PROGRESS NOTES
Virtual Visit Details    Type of service:  Video Visit   Originating Location (pt. 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  Treatment has been surgery, adjuvant 131I.   pT4a, pN1a, cMx  MACIS 5.73 assuming complete resection and no distant mets .  DONTAE recurrent risk high   RTC 1 year with labs 2-3 weeks prior: Tg, TSH, free T4     Post surgical hypothyroidism.    Treat to target TSH < 0.2 .  Unstable.  On LT4  150 mcg/day.      Vocal fold paralysis bilateral - the left VC paralysis is a new diagnosis in 9/2024, etiology uncertain . Studies have not found tumor to explain it . Symptoms have improved with voice therapy.      The Longitudinal plan of care for postsurgical hypothyroidism related to thyroid cancer/thyroid cancer surveillance was addressed during this visit. Due to added complexity of care, we will continue to support Leonid , and the subsequent management of this condition(s) and with the ongoing continuity of care of this condition.    Due to the continued risks of COVID 19 transmission and to improve ease of access this visit was a tvideo visit. .  The patient gave verbal consent for the visit today.    I have independently reviewed and interpreted labs, imaging as indicated.     Distant Location (provider location):  Off-site  Mode of Communication:  Video Conference via [employer]  Chart review/prep time 1  [id number]  Visit Start time  1633   Visit Stop time  1643   26__ I spent minutes spent on the date of the encounter doing chart review, history and exam, documentation and further activities as noted above, exclusive of CGM reading time .  .  Leslie Galvez MD    Chief complaint:  HISTORY OF PRESENT  ILLNESS  Leonid presents for follow of  BRAF V600E mutant papillary thyroid carcinoma grossly invading tracheal cartilage, post surgical hypothyroidism. I last saw him 5/2024. At that time he was reporting new TURNER, which was later found to likely be due to bilateral VC paralysis.   At our last visit he was on LT4 150 mcg/day and he continues on this.     He already had labs in anticipation of this appt.     He first became aware of right thyroid mass in 2014, before he left [place].    Right thyroid mass was seem on Thyroid US in 2017.  Recommendation for FNAB was made in 2017 and 2021   He presented to the ED with hemoptysis 3/3/2023.   CT neck and chest  showed the right mass. 6/26 fiberoptic laryngoscopy by Dr Morales showed tumor emanating into the airway.  FNAB of the right thyroid mass was positive for papillary thyroid carcinoma.   Treatment of the thyroid cancer has been as follows:   8/24/23 total thyroidectomy, tracheal resection (3 rings removed), sacrifice of the RLN, reimplanation of right inferior parathyroid gland.  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    10/26/23 Voice gel injection (Dr Dale)  1/4/24 131I 100.4 mCi    Since our last appt he has been seeing Dr Dale in the voice clinic for dysphonia and TURNER.  He was found to have a NEW left vocal fold paresis in additional to the previously known right right vocal fold paralysis.  Because of this, multiple imaging studies have been performed, including 8/2024 repeat neck US and CT chest , 10/24 MRI neck . None of the studies showed tumor to explain the abnormality.  He got speech therapy     Endocrine relevant labs are as follows:  Surgery  9/27/23 Tg  1, DONTAE < 0.4, urine iodine 58 mcg/L TSH 11.87, free T4 1.39, Ca 9.4, phos 4.3,  11/17/23 Tg 0.3, DONTAE < 0.4, TSH 7.73, free t4 1.5-- increase to 150 mcg/day.    1/4/2024 Thyrogen stimulated Tg 0.56, DONTAE < 0.4, .2, free T4 2.2  5/29/24 Tg < 0.1 (concurrent 0.57), DONTAE < 0.4 , TSH 0.28, free T4 2.29  11/25/24 Tg < 0.1, DONTAE < 0.4 , TSH 0.04, free T4 2.12  5/9/25 Tg < 0.1, DONTAE < 0.4 , TSH 0.08, free T4 1.88    Relevant imaging is as follows: (as read by me as it pertains to endocrine relevant organs)  1/4/24 131I 100.4 mCi  1/9/24 post therapy TBS  thyroid bed uptake only, left of center   8/27/24 neck US  negative   8/27/24 8/27/24 CT chest: no lung nodules   10/5/24 MRI neck complete right and partial left VC paralysis      REVIEW OF SYSTEMS  Speech therapy was helpful  Doing well  Not fully normal but better  Sleep better; wife notices he doesn't snore if he lays on his right side.  He snores loudly if on his back or left side.    Weight is back and forth -weight loss a few lbs, current weight 157   Cardiac: negative  Respiratory:  TURNER heavy duty work   Acid reflux; stops eating 6 PM which helps   No pain   No headaches.     Past Medical History  Past Medical History:   Diagnosis Date    Infection due to 2019 novel coronavirus 02/2023    Papillary thyroid carcinoma (H) 08/24/2023    Postoperative hypothyroidism 08/24/2023    Thyroid mass 2014     Past Surgical History:   Procedure Laterality Date    BRONCHOSCOPY FLEXIBLE AND RIGID N/A 7/13/2023    Procedure: Flexible bronchoscopy, direct laryngoscopy;  Surgeon: Antonio Morales MD;  Location: [facility]    ESOPHAGOSCOPY N/A 7/13/2023    Procedure: ESOPHAGOSCOPY;  Surgeon: Antonio Morales MD;  Location: [facility]    ESOPHAGOSCOPY N/A 8/24/2023    Procedure: Esophagoscopy;  Surgeon: Antonio Morales MD;  Location: [facility]    LARYNGOSCOPY, BRONCHOSCOPY, COMBINED N/A 8/24/2023    Procedure: direct laryngoscopy, flexible Bronchoscopy;  Surgeon: Antonio Morales  "MD Griffin;  Location: [facility]    RESECT TRACHEA WITH RECONSTRUCTION N/A 8/24/2023    Procedure: tracheal resection;  Surgeon: Pieter Ballard MD;  Location: [facility]    SHOULDER SURGERY Right 2005    shoulder injury, did not have general anesthesia    THYROIDECTOMY N/A 8/24/2023    Procedure: total thyroidectomy;  Surgeon: Antonio Morales MD;  Location: [facility]     Medications  Current Outpatient Medications   Medication Sig Dispense Refill    levothyroxine (SYNTHROID/LEVOTHROID) 150 MCG tablet Take 1 tablet (150 mcg) by mouth daily. 90 tablet 4     He denies noncompliance.     Allergies  No Known Allergies    Family History  Family History   Problem Relation Age of Onset    Diabetes Father     Anesthesia Reaction No family hx of     Venous thrombosis No family hx of     Myocardial Infarction No family hx of     Cerebrovascular Disease No family hx of     Thyroid Cancer No family hx of     Goiter No family hx of     Cancer No family hx of      Social History  Social History     Tobacco Use    Smoking status: Never     Passive exposure: Never    Smokeless tobacco: Never   Vaping Use    Vaping status: Never Used   Substance Use Topics    Alcohol use: Never    Drug use: Never     Came from [facility] 2014    daughter; ;      Physical Exam  GENERAL: no distress; I can see from mid chest up ; His wife is off camera at his side.  His voice is not obviously abnormal   BP Readings from Last 1 Encounters:   11/16/23 134/87      Pulse Readings from Last 1 Encounters:   11/16/23 65      Resp Readings from Last 1 Encounters:   10/18/23 18      Temp Readings from Last 1 Encounters:   10/18/23 98.2  F (36.8  C) (Oral)      SpO2 Readings from Last 1 Encounters:   10/18/23 98%      Wt Readings from Last 1 Encounters:   09/03/24 68 kg (150 lb)      Ht Readings from Last 1 Encounters:   09/03/24 1.626 m (5' 4\")     SKIN: Visible skin clear.  EYES: Eyes grossly normal to inspection.    NECK: no visible masses; no grossly visible " goiter  RESP: No audible wheeze, cough, or increased work of breathing.    NEURO:  Awake, alert, responds  to questions but many times needed repeat and interpretation of his wie.  Mentation and speech fluent.  PSYCH:affect normal,and appearance well-groomed.    DATA REVIEW     Latest Ref Rng 5/29/2024  10:14 AM 11/25/2024  8:11 AM 5/9/2025  8:06 AM   ENDO THYROID LABS-[facility]       TSH 0.30 - 4.20 uIU/mL 0.28 (L)  0.04 (L)  0.08 (L)    FREE T4 0.90 - 1.70 ng/dL 2.29 (H)  2.12 (H)  1.88 (H)       Legend:  (L) Low  (H) High    Narrative & Impression   EXAMINATION: US HEAD NECK SOFT TISSUE, 8/27/2024 10:35 AM   COMPARISON: Head and neck ultrasound 6/6/2023  HISTORY: 39-year-old man, papillary thyroid carcinoma status post  thyroidectomy.  FINDINGS:  Lymph nodes are measured bilaterally with measurements given in  craniocaudal, transverse and AP dimensions as follows:  Right:  Level 2:   (#1) 2.6 x 0.8 x 1.9 cm reniform node with normal echogenic fatty  hilum.  (#2) 1.6  x 0.5 x 0.8 cm reniform node with normal echogenic fatty  hilum  Level 3: None  Level 4: None  Level 5: None  Level 6: None  Level 7: None  Left:  Level 2:   (#1) 1.3 x 1.9 x 0.7 cm reniform node with normal echogenic fatty  hilum.  (#2) 1.0 x 1.0 x 0.6 cm reniform node with normal echogenic fatty  hilum.  Level 3: None  Level 4: None  Level 5: None  Level 6: None  Level 7: None                                                         IMPRESSION:  1.  Soft tissue neck ultrasound with lymph node measurements as  described above.  2.  No suspicious lymphadenopathy.  I have personally reviewed the examination and initial interpretation  and I agree with the findings.  ALLIE PULIDO MD      EXAM: MR SOFT TISSUE NECK W/O & W CONTRAST 10/5/2024 11:29 AM     HISTORY: Vocal cord paralysis     COMPARISON: 6/6/2023     TECHNIQUE: Sagittal, axial T1-weighted and axial T2-weighted with fat  saturation and axial diffusion-weighted (with ADC map) images of the  neck  from the skull base through the upper mediastinum were obtained  without intravenous contrast. Following intravenous administration of  gadolinium, axial and coronal T1-weighted images with fat saturation  of the neck were also obtained.     CONTRAST: 6.5 ml Gadavist     FINDINGS:   The thyroid has been resected with post surgical changes of the  trachea. Asymmetric medial deviation of the right vocal cord with  medial bowing in keeping with vocal cord paralysis. There is also a  mild degree of left vocal cord medialization suggesting incomplete  paralysis. Widening of the right piriform sinus secondary to right  vocal cord paralysis. No suspicious enhancement in the laryngeal  mucosal space.  No overtly enlarged cervical lymph nodes. Mildly prominent but  normal-appearing left level 1B cervical lymph node seen on  postcontrast axial image 65.  Salivary glands are within normal limits.  Imaged cervical vasculature is within normal limits.  No suspicious osseus lesion. No high grade spinal canal stenosis.  Right maxillary sinus mucous retention cysts, otherwise clear visualized paranasal sinuses and mastoid air cells. The imaged skull  base, intracranial and orbital structures are within normal limits.                                                       IMPRESSION: In this patient with a history of total thyroidectomy and  partial tracheal resection for biopsy-proven papillary thyroid  carcinoma;  1. Medialization of the right greater than left vocal cords suggesting complete right, and partial left vocal cord paralysis. Postsurgical  tracheal soft tissue prominence without findings suggestive of residual tumor.   2. No new lesion or cervical sushma enlargement.  MYA PATEL MD       EXAMINATION: Chest CT  8/27/2024 9:48 AM  CLINICAL HISTORY: TURNER; Papillary thyroid carcinoma (H); Postoperative hypothyroidism; Vocal fold paralysis, right  COMPARISON: Chest CT 6/26/2023  TECHNIQUE: CT imaging obtained through the  chest without contrast.  Axial, coronal, and sagittal reconstructions and axial MIP reformatted  images are reviewed.   FINDINGS:  Mediastinum: Normal heart size and contour without effusion. Normal caliber of the aorta and main pulmonary artery. Normal esophagus. Postsurgical changes of thyroidectomy. No mediastinal or neck base lymphadenopathy.  Lungs: Small bibasilar posterior pleural nodularity, favoring mild scarring. No consolidation, effusion, pneumothorax, or suspicious pulmonary nodules.  Upper Abdomen: Partially visualized upper abdominal organs are within normal limits.  Osseous Structures and Chest Wall/Soft Tissues: Chest wall soft tissues and osseous structures are within normal limits.                                                       IMPRESSION:   Stable postsurgical of the thyroidectomy without significant radiographic findings of the CT chest evaluation to explain patient's  symptoms.  ARUNA CHAVEZ MD     EXAM: MR SOFT TISSUE NECK W/O & W CONTRAST 10/5/2024 11:29 AM  HISTORY: Vocal cord paralysis  COMPARISON: 6/6/2023  TECHNIQUE: Sagittal, axial T1-weighted and axial T2-weighted with fat  saturation and axial diffusion-weighted (with ADC map) images of the  neck from the skull base through the upper mediastinum were obtained  without intravenous contrast. Following intravenous administration of  gadolinium, axial and coronal T1-weighted images with fat saturation  of the neck were also obtained.     CONTRAST: 6.5 ml Gadavist  FINDINGS:  The thyroid has been resected with post surgical changes of the  trachea. Asymmetric medial deviation of the right vocal cord with  medial bowing in keeping with vocal cord paralysis. There is also a  mild degree of left vocal cord medialization suggesting incomplete  paralysis. Widening of the right piriform sinus secondary to right  vocal cord paralysis. No suspicious enhancement in the laryngeal  mucosal space. No overtly enlarged cervical lymph nodes.  Mildly prominent but normal-appearing left level 1B cervical lymph node seen on postcontrast axial image 65.  Salivary glands are within normal limits.  Imaged cervical vasculature is within normal limits.  No suspicious osseus lesion. No high grade spinal canal stenosis.  Right maxillary sinus mucous retention cysts, otherwise clear visualized paranasal sinuses and mastoid air cells. The imaged skull base, intracranial and orbital structures are within normal limits.                                                             IMPRESSION: In this patient with a history of total thyroidectomy and partial tracheal resection for biopsy-proven papillary thyroid carcinoma;  1. Medialization of the right greater than left vocal cords suggesting complete right, and partial left vocal cord paralysis. Postsurgical tracheal soft tissue prominence without findings suggestive of residual tumor.   2. No new lesion or cervical sushma enlargement.  I have personally reviewed the examination and initial interpretation and I agree with the findings.  MYA PATEL MD

## 2025-05-27 NOTE — LETTER
5/27/2025       RE: Leonid Mcmillan  [address] [place] [address]     Dear Colleague,    Thank you for referring your patient, Leonid Mcmillan, to the [facility] [place] at [facility]. Please see a copy of my visit note below.    Virtual Visit Details    Type of service:  Video Visit   Originating Location (pt. 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  Treatment has been surgery, adjuvant 131I.   pT4a, pN1a, cMx  MACIS 5.73 assuming complete resection and no distant mets .  DONTAE recurrent risk high   RTC 1 year with labs 2-3 weeks prior: Tg, TSH, free T4     Post surgical hypothyroidism.    Treat to target TSH < 0.2 .  Unstable.  On LT4  150 mcg/day.      Vocal fold paralysis bilateral - the left VC paralysis is a new diagnosis in 9/2024, etiology uncertain . Studies have not found tumor to explain it . Symptoms have improved with voice therapy.      The Longitudinal plan of care for postsurgical hypothyroidism related to thyroid cancer/thyroid cancer surveillance was addressed during this visit. Due to added complexity of care, we will continue to support Leonid , and the subsequent management of this condition(s) and with the ongoing continuity of care of this condition.    Due to the continued risks of COVID 19 transmission and to improve ease of access this visit was a tvideo visit. .  The patient gave verbal consent for the visit today.    I have independently reviewed and interpreted labs, imaging as indicated.     Distant Location (provider location):  Off-site  Mode of Communication:  Video Conference via "[employer]  Chart review/prep time 1   [id number]  Visit Start time  1633   Visit Stop time  1643   26__ I spent minutes spent on the date of the encounter doing chart review, history and exam, documentation and further activities as noted above, exclusive of M reading time .  .  Leslie Galvez MD    Chief complaint:  HISTORY OF PRESENT ILLNESS  Leonid presents for follow of  BRAF V600E mutant papillary thyroid carcinoma grossly invading tracheal cartilage, post surgical hypothyroidism. I last saw him 5/2024. At that time he was reporting new TURNER, which was later found to likely be due to bilateral VC paralysis.   At our last visit he was on LT4 150 mcg/day and he continues on this.     He already had labs in anticipation of this appt.     He first became aware of right thyroid mass in 2014, before he left [place].    Right thyroid mass was seem on Thyroid US in 2017.  Recommendation for FNAB was made in 2017 and 2021   He presented to the ED with hemoptysis 3/3/2023.   CT neck and chest  showed the right mass. 6/26 fiberoptic laryngoscopy by Dr Morales showed tumor emanating into the airway.  FNAB of the right thyroid mass was positive for papillary thyroid carcinoma.   Treatment of the thyroid cancer has been as follows:   8/24/23 total thyroidectomy, tracheal resection (3 rings removed), sacrifice of the RLN, reimplanation of right inferior parathyroid gland.  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    10/26/23 Voice gel injection (Dr Dale)  1/4/24 131I 100.4 mCi    Since our last appt he has been seeing Dr Dale in the voice clinic for dysphonia and TURNER.  He was found to have a NEW left vocal fold paresis in additional to the previously known right  right vocal fold paralysis.  Because of this, multiple imaging studies have been performed, including 8/2024 repeat neck US and CT chest , 10/24 MRI neck . None of the studies showed tumor to explain the abnormality.  He got speech therapy     Endocrine relevant labs are as follows:  Surgery  9/27/23 Tg 1, DONTAE < 0.4, urine iodine 58 mcg/L TSH 11.87, free T4 1.39, Ca 9.4, phos 4.3,  11/17/23 Tg 0.3, DONTAE < 0.4, TSH 7.73, free t4 1.5-- increase to 150 mcg/day.    1/4/2024 Thyrogen stimulated Tg 0.56, DONTAE < 0.4, .2, free T4 2.2  5/29/24 Tg < 0.1 (concurrent 0.57), DONTAE < 0.4 , TSH 0.28, free T4 2.29  11/25/24 Tg < 0.1, DONTAE < 0.4 , TSH 0.04, free T4 2.12  5/9/25 Tg < 0.1, DONTAE < 0.4 , TSH 0.08, free T4 1.88    Relevant imaging is as follows: (as read by me as it pertains to endocrine relevant organs)  1/4/24 131I 100.4 mCi  1/9/24 post therapy TBS  thyroid bed uptake only, left of center   8/27/24 neck US  negative   8/27/24 8/27/24 CT chest: no lung nodules   10/5/24 MRI neck complete right and partial left VC paralysis      REVIEW OF SYSTEMS  Speech therapy was helpful  Doing well  Not fully normal but better  Sleep better; wife notices he doesn't snore if he lays on his right side.  He snores loudly if on his back or left side.    Weight is back and forth -weight loss a few lbs, current weight 157   Cardiac: negative  Respiratory:  TURNER heavy duty work   Acid reflux; stops eating 6 PM which helps   No pain   No headaches.     Past Medical History  Past Medical History:   Diagnosis Date     Infection due to 2019 novel coronavirus 02/2023     Papillary thyroid carcinoma (H) 08/24/2023     Postoperative hypothyroidism 08/24/2023     Thyroid mass 2014     Past Surgical History:   Procedure Laterality Date     BRONCHOSCOPY FLEXIBLE AND RIGID N/A 7/13/2023    Procedure: Flexible bronchoscopy, direct laryngoscopy;  Surgeon: Antonio Morales MD;  Location: [facility] OR     ESOPHAGOSCOPY N/A 7/13/2023    Procedure:  ESOPHAGOSCOPY;  Surgeon: Antonio Morales MD;  Location: [facility]     ESOPHAGOSCOPY N/A 8/24/2023    Procedure: Esophagoscopy;  Surgeon: Antonio Morales MD;  Location: [facility]     LARYNGOSCOPY, BRONCHOSCOPY, COMBINED N/A 8/24/2023    Procedure: direct laryngoscopy, flexible Bronchoscopy;  Surgeon: Antonio Morales MD;  Location: [facility]     RESECT TRACHEA WITH RECONSTRUCTION N/A 8/24/2023    Procedure: tracheal resection;  Surgeon: Pieter Ballard MD;  Location: [facility] OR     SHOULDER SURGERY Right 2005    shoulder injury, did not have general anesthesia     THYROIDECTOMY N/A 8/24/2023    Procedure: total thyroidectomy;  Surgeon: Antonio Morales MD;  Location: [facility] OR     Medications  Current Outpatient Medications   Medication Sig Dispense Refill     levothyroxine (SYNTHROID/LEVOTHROID) 150 MCG tablet Take 1 tablet (150 mcg) by mouth daily. 90 tablet 4     He denies noncompliance.     Allergies  No Known Allergies    Family History  Family History   Problem Relation Age of Onset     Diabetes Father      Anesthesia Reaction No family hx of      Venous thrombosis No family hx of      Myocardial Infarction No family hx of      Cerebrovascular Disease No family hx of      Thyroid Cancer No family hx of      Goiter No family hx of      Cancer No family hx of      Social History  Social History     Tobacco Use     Smoking status: Never     Passive exposure: Never     Smokeless tobacco: Never   Vaping Use     Vaping status: Never Used   Substance Use Topics     Alcohol use: Never     Drug use: Never     Came from [facility] 2014    daughter; ;      Physical Exam  GENERAL: no distress; I can see from mid chest up ; His wife is off camera at his side.  His voice is not obviously abnormal   BP Readings from Last 1 Encounters:   11/16/23 134/87      Pulse Readings from Last 1 Encounters:   11/16/23 65      Resp Readings from Last 1 Encounters:   10/18/23 18      Temp Readings from Last 1 Encounters:   10/18/23  "98.2  F (36.8  C) (Oral)      SpO2 Readings from Last 1 Encounters:   10/18/23 98%      Wt Readings from Last 1 Encounters:   09/03/24 68 kg (150 lb)      Ht Readings from Last 1 Encounters:   09/03/24 1.626 m (5' 4\")     SKIN: Visible skin clear.  EYES: Eyes grossly normal to inspection.    NECK: no visible masses; no grossly visible goiter  RESP: No audible wheeze, cough, or increased work of breathing.    NEURO:  Awake, alert, responds  to questions but many times needed repeat and interpretation of his wie.  Mentation and speech fluent.  PSYCH:affect normal,and appearance well-groomed.    DATA REVIEW     Latest Ref Rng 5/29/2024  10:14 AM 11/25/2024  8:11 AM 5/9/2025  8:06 AM   ENDO THYROID LABS-[facility]       TSH 0.30 - 4.20 uIU/mL 0.28 (L)  0.04 (L)  0.08 (L)    FREE T4 0.90 - 1.70 ng/dL 2.29 (H)  2.12 (H)  1.88 (H)       Legend:  (L) Low  (H) High    Narrative & Impression   EXAMINATION: US HEAD NECK SOFT TISSUE, 8/27/2024 10:35 AM   COMPARISON: Head and neck ultrasound 6/6/2023  HISTORY: 39-year-old man, papillary thyroid carcinoma status post  thyroidectomy.  FINDINGS:  Lymph nodes are measured bilaterally with measurements given in  craniocaudal, transverse and AP dimensions as follows:  Right:  Level 2:   (#1) 2.6 x 0.8 x 1.9 cm reniform node with normal echogenic fatty  hilum.  (#2) 1.6  x 0.5 x 0.8 cm reniform node with normal echogenic fatty  hilum  Level 3: None  Level 4: None  Level 5: None  Level 6: None  Level 7: None  Left:  Level 2:   (#1) 1.3 x 1.9 x 0.7 cm reniform node with normal echogenic fatty  hilum.  (#2) 1.0 x 1.0 x 0.6 cm reniform node with normal echogenic fatty  hilum.  Level 3: None  Level 4: None  Level 5: None  Level 6: None  Level 7: None                                                         IMPRESSION:  1.  Soft tissue neck ultrasound with lymph node measurements as  described above.  2.  No suspicious lymphadenopathy.  I have personally reviewed the examination and initial " interpretation  and I agree with the findings.  ALLIE PULIDO MD      EXAM: MR SOFT TISSUE NECK W/O & W CONTRAST 10/5/2024 11:29 AM     HISTORY: Vocal cord paralysis     COMPARISON: 6/6/2023     TECHNIQUE: Sagittal, axial T1-weighted and axial T2-weighted with fat  saturation and axial diffusion-weighted (with ADC map) images of the  neck from the skull base through the upper mediastinum were obtained  without intravenous contrast. Following intravenous administration of  gadolinium, axial and coronal T1-weighted images with fat saturation  of the neck were also obtained.     CONTRAST: 6.5 ml Gadavist     FINDINGS:   The thyroid has been resected with post surgical changes of the  trachea. Asymmetric medial deviation of the right vocal cord with  medial bowing in keeping with vocal cord paralysis. There is also a  mild degree of left vocal cord medialization suggesting incomplete  paralysis. Widening of the right piriform sinus secondary to right  vocal cord paralysis. No suspicious enhancement in the laryngeal  mucosal space.  No overtly enlarged cervical lymph nodes. Mildly prominent but  normal-appearing left level 1B cervical lymph node seen on  postcontrast axial image 65.  Salivary glands are within normal limits.  Imaged cervical vasculature is within normal limits.  No suspicious osseus lesion. No high grade spinal canal stenosis.  Right maxillary sinus mucous retention cysts, otherwise clear visualized paranasal sinuses and mastoid air cells. The imaged skull  base, intracranial and orbital structures are within normal limits.                                                       IMPRESSION: In this patient with a history of total thyroidectomy and  partial tracheal resection for biopsy-proven papillary thyroid  carcinoma;  1. Medialization of the right greater than left vocal cords suggesting complete right, and partial left vocal cord paralysis. Postsurgical  tracheal soft tissue prominence without  findings suggestive of residual tumor.   2. No new lesion or cervical sushma enlargement.  MYA PATEL MD       EXAMINATION: Chest CT  8/27/2024 9:48 AM  CLINICAL HISTORY: TURNER; Papillary thyroid carcinoma (H); Postoperative hypothyroidism; Vocal fold paralysis, right  COMPARISON: Chest CT 6/26/2023  TECHNIQUE: CT imaging obtained through the chest without contrast.  Axial, coronal, and sagittal reconstructions and axial MIP reformatted  images are reviewed.   FINDINGS:  Mediastinum: Normal heart size and contour without effusion. Normal caliber of the aorta and main pulmonary artery. Normal esophagus. Postsurgical changes of thyroidectomy. No mediastinal or neck base lymphadenopathy.  Lungs: Small bibasilar posterior pleural nodularity, favoring mild scarring. No consolidation, effusion, pneumothorax, or suspicious pulmonary nodules.  Upper Abdomen: Partially visualized upper abdominal organs are within normal limits.  Osseous Structures and Chest Wall/Soft Tissues: Chest wall soft tissues and osseous structures are within normal limits.                                                       IMPRESSION:   Stable postsurgical of the thyroidectomy without significant radiographic findings of the CT chest evaluation to explain patient's  symptoms.  ARUNA CHAVEZ MD     EXAM: MR SOFT TISSUE NECK W/O & W CONTRAST 10/5/2024 11:29 AM  HISTORY: Vocal cord paralysis  COMPARISON: 6/6/2023  TECHNIQUE: Sagittal, axial T1-weighted and axial T2-weighted with fat  saturation and axial diffusion-weighted (with ADC map) images of the  neck from the skull base through the upper mediastinum were obtained  without intravenous contrast. Following intravenous administration of  gadolinium, axial and coronal T1-weighted images with fat saturation  of the neck were also obtained.     CONTRAST: 6.5 ml Gadavist  FINDINGS:  The thyroid has been resected with post surgical changes of the  trachea. Asymmetric medial deviation of the right  vocal cord with  medial bowing in keeping with vocal cord paralysis. There is also a  mild degree of left vocal cord medialization suggesting incomplete  paralysis. Widening of the right piriform sinus secondary to right  vocal cord paralysis. No suspicious enhancement in the laryngeal  mucosal space. No overtly enlarged cervical lymph nodes. Mildly prominent but normal-appearing left level 1B cervical lymph node seen on postcontrast axial image 65.  Salivary glands are within normal limits.  Imaged cervical vasculature is within normal limits.  No suspicious osseus lesion. No high grade spinal canal stenosis.  Right maxillary sinus mucous retention cysts, otherwise clear visualized paranasal sinuses and mastoid air cells. The imaged skull base, intracranial and orbital structures are within normal limits.                                                             IMPRESSION: In this patient with a history of total thyroidectomy and partial tracheal resection for biopsy-proven papillary thyroid carcinoma;  1. Medialization of the right greater than left vocal cords suggesting complete right, and partial left vocal cord paralysis. Postsurgical tracheal soft tissue prominence without findings suggestive of residual tumor.   2. No new lesion or cervical sushma enlargement.  I have personally reviewed the examination and initial interpretation and I agree with the findings.  MYA PATEL MD         Again, thank you for allowing me to participate in the care of your patient.      Sincerely,    Leslie Galvez MD

## 2025-05-27 NOTE — NURSING NOTE
Current patient location: [address] [place] [address]    Is the patient currently in the state of [place]? YES    Visit mode: VIDEO    If the visit is dropped, the patient can be reconnected by:VIDEO VISIT: Text to cell phone:   Telephone Information:   Mobile [phone]       Will anyone else be joining the visit? NO  (If patient encounters technical issues they should call [phone])    Are changes needed to the allergy or medication list? No    Are refills needed on medications prescribed by this physician? NO    Rooming Documentation:  Unable to complete questionnaire(s) due to time    Reason for visit: RECHECK    Beronica CHURCHF

## 2025-05-28 PROBLEM — Z77.098 EXPOSURE TO IODINE: Status: RESOLVED | Noted: 2023-09-27 | Resolved: 2025-05-28

## 2025-05-28 PROBLEM — Z48.89 ENCOUNTER FOR POSTOPERATIVE CARE: Status: RESOLVED | Noted: 2023-08-24 | Resolved: 2025-05-28

## 2025-08-10 ENCOUNTER — HEALTH MAINTENANCE LETTER (OUTPATIENT)
Age: 40
End: 2025-08-10

## (undated) DEVICE — SPONGE COTTONOID 1/2X3" 80-1407

## (undated) DEVICE — DRAIN JACKSON PRATT RESERVOIR 100ML SU130-1305

## (undated) DEVICE — SU SILK 2-0 SH CR 8X18" C012D

## (undated) DEVICE — ANTIFOG SOLUTION W/FOAM PAD 31142527

## (undated) DEVICE — ENDO TUBING CO2 SMARTCAP STERILE DISP 100145CO2EXT

## (undated) DEVICE — TUBING SUCTION 10'X3/16" N510

## (undated) DEVICE — SUCTION MANIFOLD NEPTUNE 2 SYS 4 PORT 0702-020-000

## (undated) DEVICE — ESU ELEC BLADE 2.75" COATED/INSULATED E1455

## (undated) DEVICE — Device

## (undated) DEVICE — SOL WATER IRRIG 1000ML BOTTLE 2F7114

## (undated) DEVICE — JELLY LUBRICATING SURGILUBE 2OZ TUBE

## (undated) DEVICE — ENDO CAP AND TUBING STERILE FOR ENDOGATOR  100130

## (undated) DEVICE — LABEL MEDICATION SYSTEM 3303-P

## (undated) DEVICE — SU SILK 0 SH 30" K834H

## (undated) DEVICE — PACK GOWN 3/PK DISP XL SBA32GPFCB

## (undated) DEVICE — SOL NACL 0.9% IRRIG 1000ML BOTTLE 2F7124

## (undated) DEVICE — SU SILK 3-0 TIE 12X30" A304H

## (undated) DEVICE — CLIP HORIZON SM RED WIDE SLOT 001201

## (undated) DEVICE — PREP POVIDONE-IODINE 10% SOLUTION 4OZ BOTTLE MDS093944

## (undated) DEVICE — LINEN TOWEL PACK X5 5464

## (undated) DEVICE — SU ETHILON 3-0 PS-1 18" 1663H

## (undated) DEVICE — LINEN TOWEL PACK X30 5481

## (undated) DEVICE — BLADE SAW STERNAL 20X30MM KM-32

## (undated) DEVICE — SU SILK 0 TIE 6X30" A306H

## (undated) DEVICE — ENDO VALVE SUCTION BRONCH EVIS MAJ-209

## (undated) DEVICE — ENDO TOOTH QUICK GUARD CONFORMING PROTECTION

## (undated) DEVICE — BLADE CLIPPER SGL USE 9680

## (undated) DEVICE — PACK NEURO MINOR UMMC SNE32MNMU4

## (undated) DEVICE — SPONGE KITTNER 30-101

## (undated) DEVICE — DRSG GAUZE 4X4" 8044

## (undated) DEVICE — SU VICRYL 2-0 SH 27" UND J417H

## (undated) DEVICE — NIM ELEC SUBDERMAL NDL PAIRED 2 CHANNEL 8227410

## (undated) DEVICE — DRSG TELFA 3X8" 1238

## (undated) DEVICE — KIT ENDO FIRST STEP DISINFECTANT 200ML W/POUCH EP-4

## (undated) DEVICE — SU ETHILON 4-0 PC-3 18" 1864G

## (undated) DEVICE — PREP SKIN SCRUB TRAY 4461A

## (undated) DEVICE — TUBING SUCTION MEDI-VAC SOFT 3/16"X20' N520A

## (undated) DEVICE — DRSG GAUZE 4X4" TRAY 6939

## (undated) DEVICE — DRAIN JACKSON PRATT 15FR ROUND SU130-1323

## (undated) DEVICE — ENDO VALVE BX EVIS MAJ-210

## (undated) DEVICE — BLADE KNIFE SURG 15 371115

## (undated) DEVICE — SU PDS II 3-0 SH 27" Z316H

## (undated) DEVICE — RETR ELASTIC STAYS LONE STAR BLUNT DUAL LEAD 3550-1G

## (undated) DEVICE — SU VICRYL 3-0 SH 8X18" UND J864D

## (undated) DEVICE — DRSG TEGADERM 4X4 3/4" 1626W

## (undated) DEVICE — SU MONOCRYL 4-0 PS-2 27" UND Y426H

## (undated) DEVICE — SU SILK 2-0 TIE 12X30" A305H

## (undated) DEVICE — DECANTER VIAL 2006S

## (undated) DEVICE — PACK ENT ENDOSCOPY UMMC

## (undated) DEVICE — CLIP HORIZON MED BLUE 002200

## (undated) DEVICE — NIM PROBE PRASS INCREMENTING TIP 8225825

## (undated) DEVICE — DRAPE SHEET MED 44X70" 9355

## (undated) DEVICE — GLOVE BIOGEL PI MICRO SZ 7.5 48575

## (undated) DEVICE — LINEN TOWEL PACK X6 WHITE 5487

## (undated) DEVICE — DRAPE IOBAN INCISE 23X17" 6650EZ

## (undated) DEVICE — SYR 10ML LL W/O NDL 302995

## (undated) DEVICE — ESU GROUND PAD ADULT W/CORD E7507

## (undated) DEVICE — KIT CONNECTOR FOR OLYMPUS ENDOSCOPES DEFENDO 100310

## (undated) DEVICE — SU PDS II 4-0 SH 27" Z315H

## (undated) DEVICE — PITCHER STERILE 1000ML  SSK9004A

## (undated) DEVICE — ADH LIQUID MASTISOL TOPICAL VIAL 2-3ML 0523-48

## (undated) DEVICE — ENDO ADPT BRONCH SWIVEL Y A1002

## (undated) DEVICE — SU VICRYL 2-0 SH 8-27" J785G

## (undated) DEVICE — SU SILK 4-0 TIE 12X30" A303H

## (undated) DEVICE — DRSG DRAIN 4X4" 7086

## (undated) DEVICE — SUCTION SLEEVE NEPTUNE 2 165MM 0703-005-165

## (undated) DEVICE — GLOVE BIOGEL PI MICRO SZ 6.5 48565

## (undated) DEVICE — SPONGE LAP 18X18" X8435

## (undated) DEVICE — COVER CAMERA VIDEO LASER 9X96" 04-CC227

## (undated) DEVICE — DRSG STERI STRIP 1/2X4" R1547

## (undated) RX ORDER — EPHEDRINE SULFATE 50 MG/ML
INJECTION, SOLUTION INTRAMUSCULAR; INTRAVENOUS; SUBCUTANEOUS
Status: DISPENSED
Start: 2023-08-24

## (undated) RX ORDER — DEXAMETHASONE SODIUM PHOSPHATE 4 MG/ML
INJECTION, SOLUTION INTRA-ARTICULAR; INTRALESIONAL; INTRAMUSCULAR; INTRAVENOUS; SOFT TISSUE
Status: DISPENSED
Start: 2023-08-24

## (undated) RX ORDER — HYDROMORPHONE HYDROCHLORIDE 1 MG/ML
INJECTION, SOLUTION INTRAMUSCULAR; INTRAVENOUS; SUBCUTANEOUS
Status: DISPENSED
Start: 2023-08-24

## (undated) RX ORDER — OXYMETAZOLINE HYDROCHLORIDE 0.05 G/100ML
SPRAY NASAL
Status: DISPENSED
Start: 2023-07-13

## (undated) RX ORDER — PROPOFOL 10 MG/ML
INJECTION, EMULSION INTRAVENOUS
Status: DISPENSED
Start: 2023-07-13

## (undated) RX ORDER — LIDOCAINE HYDROCHLORIDE 10 MG/ML
INJECTION, SOLUTION EPIDURAL; INFILTRATION; INTRACAUDAL; PERINEURAL
Status: DISPENSED
Start: 2023-07-13

## (undated) RX ORDER — PROPOFOL 10 MG/ML
INJECTION, EMULSION INTRAVENOUS
Status: DISPENSED
Start: 2023-08-24

## (undated) RX ORDER — AMPICILLIN AND SULBACTAM 2; 1 G/1; G/1
INJECTION, POWDER, FOR SOLUTION INTRAMUSCULAR; INTRAVENOUS
Status: DISPENSED
Start: 2023-08-24

## (undated) RX ORDER — LIDOCAINE HYDROCHLORIDE 10 MG/ML
INJECTION, SOLUTION EPIDURAL; INFILTRATION; INTRACAUDAL; PERINEURAL
Status: DISPENSED
Start: 2023-05-25

## (undated) RX ORDER — LIDOCAINE HYDROCHLORIDE 20 MG/ML
SOLUTION OROPHARYNGEAL
Status: DISPENSED
Start: 2024-09-03

## (undated) RX ORDER — ONDANSETRON 2 MG/ML
INJECTION INTRAMUSCULAR; INTRAVENOUS
Status: DISPENSED
Start: 2023-07-13

## (undated) RX ORDER — FENTANYL CITRATE 50 UG/ML
INJECTION, SOLUTION INTRAMUSCULAR; INTRAVENOUS
Status: DISPENSED
Start: 2023-07-13

## (undated) RX ORDER — FENTANYL CITRATE 50 UG/ML
INJECTION, SOLUTION INTRAMUSCULAR; INTRAVENOUS
Status: DISPENSED
Start: 2023-08-24

## (undated) RX ORDER — DEXAMETHASONE SODIUM PHOSPHATE 10 MG/ML
INJECTION INTRAMUSCULAR; INTRAVENOUS
Status: DISPENSED
Start: 2023-08-24

## (undated) RX ORDER — LIDOCAINE HYDROCHLORIDE AND EPINEPHRINE 10; 10 MG/ML; UG/ML
INJECTION, SOLUTION INFILTRATION; PERINEURAL
Status: DISPENSED
Start: 2023-08-24

## (undated) RX ORDER — OXYMETAZOLINE HYDROCHLORIDE 0.05 G/100ML
SPRAY NASAL
Status: DISPENSED
Start: 2023-08-24

## (undated) RX ORDER — GLYCOPYRROLATE 0.2 MG/ML
INJECTION, SOLUTION INTRAMUSCULAR; INTRAVENOUS
Status: DISPENSED
Start: 2023-08-24

## (undated) RX ORDER — ONDANSETRON 2 MG/ML
INJECTION INTRAMUSCULAR; INTRAVENOUS
Status: DISPENSED
Start: 2023-08-24

## (undated) RX ORDER — FENTANYL CITRATE-0.9 % NACL/PF 10 MCG/ML
PLASTIC BAG, INJECTION (ML) INTRAVENOUS
Status: DISPENSED
Start: 2023-08-24

## (undated) RX ORDER — ACETAMINOPHEN 325 MG/1
TABLET ORAL
Status: DISPENSED
Start: 2023-08-24